# Patient Record
Sex: FEMALE | Race: WHITE | NOT HISPANIC OR LATINO | Employment: OTHER | URBAN - METROPOLITAN AREA
[De-identification: names, ages, dates, MRNs, and addresses within clinical notes are randomized per-mention and may not be internally consistent; named-entity substitution may affect disease eponyms.]

---

## 2017-03-12 ENCOUNTER — COMMUNICATION - HEALTHEAST (OUTPATIENT)
Dept: FAMILY MEDICINE | Facility: CLINIC | Age: 67
End: 2017-03-12

## 2017-03-12 DIAGNOSIS — Z00.00 ROUTINE ADULT HEALTH MAINTENANCE: ICD-10-CM

## 2017-03-12 DIAGNOSIS — E78.00 ELEVATED CHOLESTEROL: ICD-10-CM

## 2017-03-12 DIAGNOSIS — E87.5 SERUM POTASSIUM ELEVATED: ICD-10-CM

## 2017-03-15 ENCOUNTER — AMBULATORY - HEALTHEAST (OUTPATIENT)
Dept: LAB | Facility: CLINIC | Age: 67
End: 2017-03-15

## 2017-03-15 DIAGNOSIS — E87.5 SERUM POTASSIUM ELEVATED: ICD-10-CM

## 2017-03-15 DIAGNOSIS — E78.00 ELEVATED CHOLESTEROL: ICD-10-CM

## 2017-03-15 DIAGNOSIS — Z00.00 ROUTINE ADULT HEALTH MAINTENANCE: ICD-10-CM

## 2017-03-15 LAB
CHOLEST SERPL-MCNC: 215 MG/DL
FASTING STATUS PATIENT QL REPORTED: YES
HDLC SERPL-MCNC: 71 MG/DL
LDLC SERPL CALC-MCNC: 130 MG/DL
TRIGL SERPL-MCNC: 70 MG/DL

## 2017-03-22 ENCOUNTER — OFFICE VISIT - HEALTHEAST (OUTPATIENT)
Dept: FAMILY MEDICINE | Facility: CLINIC | Age: 67
End: 2017-03-22

## 2017-03-22 DIAGNOSIS — R32 URINARY INCONTINENCE: ICD-10-CM

## 2017-03-22 DIAGNOSIS — Z00.00 ROUTINE GENERAL MEDICAL EXAMINATION AT A HEALTH CARE FACILITY: ICD-10-CM

## 2017-03-22 DIAGNOSIS — H26.9 UNSPECIFIED CATARACT: ICD-10-CM

## 2017-03-22 DIAGNOSIS — N39.492 POSTURAL URINARY INCONTINENCE: ICD-10-CM

## 2017-03-22 DIAGNOSIS — M19.071 PRIMARY LOCALIZED OSTEOARTHROSIS, ANKLE AND FOOT, RIGHT: ICD-10-CM

## 2017-03-22 DIAGNOSIS — M81.0 OSTEOPOROSIS: ICD-10-CM

## 2017-03-22 DIAGNOSIS — Z00.00 HEALTH MAINTENANCE EXAMINATION: ICD-10-CM

## 2017-03-22 ASSESSMENT — MIFFLIN-ST. JEOR: SCORE: 1341.5

## 2017-03-23 ENCOUNTER — OFFICE VISIT - HEALTHEAST (OUTPATIENT)
Dept: PHYSICAL THERAPY | Facility: CLINIC | Age: 67
End: 2017-03-23

## 2017-03-23 DIAGNOSIS — M81.0 OSTEOPOROSIS: ICD-10-CM

## 2017-03-23 DIAGNOSIS — N39.41 URGE INCONTINENCE: ICD-10-CM

## 2017-03-23 DIAGNOSIS — R39.15 URINARY URGENCY: ICD-10-CM

## 2017-03-23 DIAGNOSIS — N81.89 PELVIC FLOOR WEAKNESS: ICD-10-CM

## 2017-04-06 ENCOUNTER — OFFICE VISIT - HEALTHEAST (OUTPATIENT)
Dept: PHYSICAL THERAPY | Facility: CLINIC | Age: 67
End: 2017-04-06

## 2017-04-06 DIAGNOSIS — R39.15 URINARY URGENCY: ICD-10-CM

## 2017-04-06 DIAGNOSIS — M81.0 OSTEOPOROSIS: ICD-10-CM

## 2017-04-06 DIAGNOSIS — N39.41 URGE INCONTINENCE: ICD-10-CM

## 2017-04-06 DIAGNOSIS — N81.89 PELVIC FLOOR WEAKNESS: ICD-10-CM

## 2017-04-11 ENCOUNTER — OFFICE VISIT - HEALTHEAST (OUTPATIENT)
Dept: PHYSICAL THERAPY | Facility: CLINIC | Age: 67
End: 2017-04-11

## 2017-04-11 DIAGNOSIS — R39.15 URINARY URGENCY: ICD-10-CM

## 2017-04-11 DIAGNOSIS — M81.0 OSTEOPOROSIS: ICD-10-CM

## 2017-04-11 DIAGNOSIS — N39.41 URGE INCONTINENCE: ICD-10-CM

## 2017-04-11 DIAGNOSIS — N81.89 PELVIC FLOOR WEAKNESS: ICD-10-CM

## 2017-04-26 ENCOUNTER — OFFICE VISIT - HEALTHEAST (OUTPATIENT)
Dept: PHYSICAL THERAPY | Facility: CLINIC | Age: 67
End: 2017-04-26

## 2017-04-26 DIAGNOSIS — M81.0 OSTEOPOROSIS: ICD-10-CM

## 2017-04-26 DIAGNOSIS — R39.15 URINARY URGENCY: ICD-10-CM

## 2017-04-26 DIAGNOSIS — N81.89 PELVIC FLOOR WEAKNESS: ICD-10-CM

## 2017-04-26 DIAGNOSIS — N39.41 URGE INCONTINENCE: ICD-10-CM

## 2017-05-09 ENCOUNTER — OFFICE VISIT - HEALTHEAST (OUTPATIENT)
Dept: PHYSICAL THERAPY | Facility: CLINIC | Age: 67
End: 2017-05-09

## 2017-05-09 DIAGNOSIS — N39.41 URGE INCONTINENCE: ICD-10-CM

## 2017-05-09 DIAGNOSIS — M81.0 OSTEOPOROSIS: ICD-10-CM

## 2017-05-09 DIAGNOSIS — R39.15 URINARY URGENCY: ICD-10-CM

## 2017-05-09 DIAGNOSIS — N81.89 PELVIC FLOOR WEAKNESS: ICD-10-CM

## 2017-05-23 ENCOUNTER — OFFICE VISIT - HEALTHEAST (OUTPATIENT)
Dept: PHYSICAL THERAPY | Facility: CLINIC | Age: 67
End: 2017-05-23

## 2017-05-23 DIAGNOSIS — N81.89 PELVIC FLOOR WEAKNESS: ICD-10-CM

## 2017-05-23 DIAGNOSIS — R39.15 URINARY URGENCY: ICD-10-CM

## 2017-05-23 DIAGNOSIS — M81.0 OSTEOPOROSIS: ICD-10-CM

## 2017-05-23 DIAGNOSIS — N39.41 URGE INCONTINENCE: ICD-10-CM

## 2017-06-06 ENCOUNTER — OFFICE VISIT - HEALTHEAST (OUTPATIENT)
Dept: PHYSICAL THERAPY | Facility: CLINIC | Age: 67
End: 2017-06-06

## 2017-06-06 DIAGNOSIS — N39.41 URGE INCONTINENCE: ICD-10-CM

## 2017-06-06 DIAGNOSIS — M81.0 OSTEOPOROSIS: ICD-10-CM

## 2017-06-06 DIAGNOSIS — R39.15 URINARY URGENCY: ICD-10-CM

## 2017-06-06 DIAGNOSIS — N81.89 PELVIC FLOOR WEAKNESS: ICD-10-CM

## 2017-06-27 ENCOUNTER — OFFICE VISIT - HEALTHEAST (OUTPATIENT)
Dept: PHYSICAL THERAPY | Facility: CLINIC | Age: 67
End: 2017-06-27

## 2017-06-27 DIAGNOSIS — M81.0 OSTEOPOROSIS: ICD-10-CM

## 2017-06-27 DIAGNOSIS — N39.41 URGE INCONTINENCE: ICD-10-CM

## 2017-06-27 DIAGNOSIS — N81.89 PELVIC FLOOR WEAKNESS: ICD-10-CM

## 2017-06-27 DIAGNOSIS — R39.15 URINARY URGENCY: ICD-10-CM

## 2017-07-28 ENCOUNTER — OFFICE VISIT - HEALTHEAST (OUTPATIENT)
Dept: PHYSICAL THERAPY | Facility: REHABILITATION | Age: 67
End: 2017-07-28

## 2017-07-28 DIAGNOSIS — N81.89 PELVIC FLOOR WEAKNESS: ICD-10-CM

## 2017-07-28 DIAGNOSIS — N39.41 URGE INCONTINENCE: ICD-10-CM

## 2017-07-28 DIAGNOSIS — M81.0 OSTEOPOROSIS: ICD-10-CM

## 2017-07-28 DIAGNOSIS — R39.15 URINARY URGENCY: ICD-10-CM

## 2017-07-31 ENCOUNTER — COMMUNICATION - HEALTHEAST (OUTPATIENT)
Dept: FAMILY MEDICINE | Facility: CLINIC | Age: 67
End: 2017-07-31

## 2017-07-31 DIAGNOSIS — N95.2 POSTMENOPAUSAL ATROPHIC VAGINITIS: ICD-10-CM

## 2017-08-14 ENCOUNTER — COMMUNICATION - HEALTHEAST (OUTPATIENT)
Dept: FAMILY MEDICINE | Facility: CLINIC | Age: 67
End: 2017-08-14

## 2017-08-22 ENCOUNTER — OFFICE VISIT - HEALTHEAST (OUTPATIENT)
Dept: PHYSICAL THERAPY | Facility: REHABILITATION | Age: 67
End: 2017-08-22

## 2017-08-22 DIAGNOSIS — N81.89 PELVIC FLOOR WEAKNESS: ICD-10-CM

## 2017-08-22 DIAGNOSIS — M81.0 OSTEOPOROSIS: ICD-10-CM

## 2017-08-22 DIAGNOSIS — N39.41 URGE INCONTINENCE: ICD-10-CM

## 2017-08-22 DIAGNOSIS — R39.15 URINARY URGENCY: ICD-10-CM

## 2017-09-08 ENCOUNTER — OFFICE VISIT - HEALTHEAST (OUTPATIENT)
Dept: FAMILY MEDICINE | Facility: CLINIC | Age: 67
End: 2017-09-08

## 2017-09-08 DIAGNOSIS — R17 TOTAL BILIRUBIN, ELEVATED: ICD-10-CM

## 2017-09-08 DIAGNOSIS — R42 DIZZY SPELLS: ICD-10-CM

## 2017-09-08 LAB — HBA1C MFR BLD: 5.7 % (ref 3.5–6)

## 2017-09-08 ASSESSMENT — MIFFLIN-ST. JEOR: SCORE: 1368.17

## 2017-09-11 ENCOUNTER — COMMUNICATION - HEALTHEAST (OUTPATIENT)
Dept: FAMILY MEDICINE | Facility: CLINIC | Age: 67
End: 2017-09-11

## 2017-09-12 ENCOUNTER — COMMUNICATION - HEALTHEAST (OUTPATIENT)
Dept: FAMILY MEDICINE | Facility: CLINIC | Age: 67
End: 2017-09-12

## 2017-09-13 ENCOUNTER — COMMUNICATION - HEALTHEAST (OUTPATIENT)
Dept: FAMILY MEDICINE | Facility: CLINIC | Age: 67
End: 2017-09-13

## 2017-09-13 DIAGNOSIS — N95.2 POST-MENOPAUSAL ATROPHIC VAGINITIS: ICD-10-CM

## 2017-09-14 ENCOUNTER — COMMUNICATION - HEALTHEAST (OUTPATIENT)
Dept: FAMILY MEDICINE | Facility: CLINIC | Age: 67
End: 2017-09-14

## 2017-09-14 ENCOUNTER — RECORDS - HEALTHEAST (OUTPATIENT)
Dept: ADMINISTRATIVE | Facility: OTHER | Age: 67
End: 2017-09-14

## 2017-09-18 ENCOUNTER — COMMUNICATION - HEALTHEAST (OUTPATIENT)
Dept: FAMILY MEDICINE | Facility: CLINIC | Age: 67
End: 2017-09-18

## 2017-09-19 ENCOUNTER — OFFICE VISIT - HEALTHEAST (OUTPATIENT)
Dept: FAMILY MEDICINE | Facility: CLINIC | Age: 67
End: 2017-09-19

## 2017-09-19 DIAGNOSIS — L03.90 CELLULITIS: ICD-10-CM

## 2017-09-19 DIAGNOSIS — L23.7 POISON IVY DERMATITIS: ICD-10-CM

## 2017-09-24 ENCOUNTER — OFFICE VISIT - HEALTHEAST (OUTPATIENT)
Dept: FAMILY MEDICINE | Facility: CLINIC | Age: 67
End: 2017-09-24

## 2017-09-24 DIAGNOSIS — L23.7 POISON IVY DERMATITIS: ICD-10-CM

## 2017-10-03 ENCOUNTER — COMMUNICATION - HEALTHEAST (OUTPATIENT)
Dept: FAMILY MEDICINE | Facility: CLINIC | Age: 67
End: 2017-10-03

## 2017-11-02 ENCOUNTER — OFFICE VISIT - HEALTHEAST (OUTPATIENT)
Dept: FAMILY MEDICINE | Facility: CLINIC | Age: 67
End: 2017-11-02

## 2017-11-02 DIAGNOSIS — R07.0 THROAT PAIN: ICD-10-CM

## 2017-11-03 ENCOUNTER — OFFICE VISIT - HEALTHEAST (OUTPATIENT)
Dept: FAMILY MEDICINE | Facility: CLINIC | Age: 67
End: 2017-11-03

## 2017-11-03 DIAGNOSIS — J02.9 SORE THROAT: ICD-10-CM

## 2017-11-03 DIAGNOSIS — F43.9 STRESS AT HOME: ICD-10-CM

## 2017-11-03 ASSESSMENT — MIFFLIN-ST. JEOR: SCORE: 1378.38

## 2017-11-22 ENCOUNTER — COMMUNICATION - HEALTHEAST (OUTPATIENT)
Dept: FAMILY MEDICINE | Facility: CLINIC | Age: 67
End: 2017-11-22

## 2017-11-29 ENCOUNTER — HOSPITAL ENCOUNTER (OUTPATIENT)
Dept: CARDIOLOGY | Facility: HOSPITAL | Age: 67
Discharge: HOME | End: 2017-11-29
Attending: FAMILY MEDICINE

## 2017-11-29 DIAGNOSIS — R42 DIZZY SPELLS: ICD-10-CM

## 2017-12-05 ENCOUNTER — COMMUNICATION - HEALTHEAST (OUTPATIENT)
Dept: FAMILY MEDICINE | Facility: CLINIC | Age: 67
End: 2017-12-05

## 2018-01-02 ENCOUNTER — COMMUNICATION - HEALTHEAST (OUTPATIENT)
Dept: FAMILY MEDICINE | Facility: CLINIC | Age: 68
End: 2018-01-02

## 2018-01-02 ENCOUNTER — RECORDS - HEALTHEAST (OUTPATIENT)
Dept: ADMINISTRATIVE | Facility: OTHER | Age: 68
End: 2018-01-02

## 2018-01-11 ENCOUNTER — COMMUNICATION - HEALTHEAST (OUTPATIENT)
Dept: FAMILY MEDICINE | Facility: CLINIC | Age: 68
End: 2018-01-11

## 2018-01-16 ENCOUNTER — RECORDS - HEALTHEAST (OUTPATIENT)
Dept: GENERAL RADIOLOGY | Facility: CLINIC | Age: 68
End: 2018-01-16

## 2018-01-16 ENCOUNTER — OFFICE VISIT - HEALTHEAST (OUTPATIENT)
Dept: FAMILY MEDICINE | Facility: CLINIC | Age: 68
End: 2018-01-16

## 2018-01-16 ENCOUNTER — COMMUNICATION - HEALTHEAST (OUTPATIENT)
Dept: FAMILY MEDICINE | Facility: CLINIC | Age: 68
End: 2018-01-16

## 2018-01-16 DIAGNOSIS — M25.522 LEFT ELBOW PAIN: ICD-10-CM

## 2018-01-16 DIAGNOSIS — M25.522 PAIN IN LEFT ELBOW: ICD-10-CM

## 2018-01-16 DIAGNOSIS — L82.1 SEBORRHEIC KERATOSIS: ICD-10-CM

## 2018-01-16 DIAGNOSIS — N95.2 PERIMENOPAUSAL ATROPHIC VAGINITIS: ICD-10-CM

## 2018-01-16 ASSESSMENT — MIFFLIN-ST. JEOR: SCORE: 1373.27

## 2018-01-17 ENCOUNTER — COMMUNICATION - HEALTHEAST (OUTPATIENT)
Dept: FAMILY MEDICINE | Facility: CLINIC | Age: 68
End: 2018-01-17

## 2018-01-19 ENCOUNTER — COMMUNICATION - HEALTHEAST (OUTPATIENT)
Dept: FAMILY MEDICINE | Facility: CLINIC | Age: 68
End: 2018-01-19

## 2018-01-23 ENCOUNTER — RECORDS - HEALTHEAST (OUTPATIENT)
Dept: ADMINISTRATIVE | Facility: OTHER | Age: 68
End: 2018-01-23

## 2018-01-31 ENCOUNTER — COMMUNICATION - HEALTHEAST (OUTPATIENT)
Dept: FAMILY MEDICINE | Facility: CLINIC | Age: 68
End: 2018-01-31

## 2018-03-18 ENCOUNTER — COMMUNICATION - HEALTHEAST (OUTPATIENT)
Dept: FAMILY MEDICINE | Facility: CLINIC | Age: 68
End: 2018-03-18

## 2018-03-22 ENCOUNTER — COMMUNICATION - HEALTHEAST (OUTPATIENT)
Dept: FAMILY MEDICINE | Facility: CLINIC | Age: 68
End: 2018-03-22

## 2018-03-22 ENCOUNTER — AMBULATORY - HEALTHEAST (OUTPATIENT)
Dept: FAMILY MEDICINE | Facility: CLINIC | Age: 68
End: 2018-03-22

## 2018-03-22 DIAGNOSIS — Z00.00 ROUTINE ADULT HEALTH MAINTENANCE: ICD-10-CM

## 2018-03-22 DIAGNOSIS — R17 ELEVATED BILIRUBIN: ICD-10-CM

## 2018-03-22 DIAGNOSIS — E78.00 PURE HYPERCHOLESTEROLEMIA: ICD-10-CM

## 2018-04-13 ENCOUNTER — AMBULATORY - HEALTHEAST (OUTPATIENT)
Dept: LAB | Facility: CLINIC | Age: 68
End: 2018-04-13

## 2018-04-13 DIAGNOSIS — Z00.00 ROUTINE ADULT HEALTH MAINTENANCE: ICD-10-CM

## 2018-04-13 DIAGNOSIS — R17 ELEVATED BILIRUBIN: ICD-10-CM

## 2018-04-13 DIAGNOSIS — E78.00 PURE HYPERCHOLESTEROLEMIA: ICD-10-CM

## 2018-04-13 LAB
ANION GAP SERPL CALCULATED.3IONS-SCNC: 11 MMOL/L (ref 5–18)
BILIRUB DIRECT SERPL-MCNC: 0.4 MG/DL
BILIRUB SERPL-MCNC: 1.5 MG/DL (ref 0–1)
BUN SERPL-MCNC: 18 MG/DL (ref 8–22)
CALCIUM SERPL-MCNC: 9.9 MG/DL (ref 8.5–10.5)
CHLORIDE BLD-SCNC: 103 MMOL/L (ref 98–107)
CHOLEST SERPL-MCNC: 202 MG/DL
CO2 SERPL-SCNC: 26 MMOL/L (ref 22–31)
CREAT SERPL-MCNC: 0.8 MG/DL (ref 0.6–1.1)
FASTING STATUS PATIENT QL REPORTED: YES
GFR SERPL CREATININE-BSD FRML MDRD: >60 ML/MIN/1.73M2
GLUCOSE BLD-MCNC: 90 MG/DL (ref 70–125)
HDLC SERPL-MCNC: 66 MG/DL
LDLC SERPL CALC-MCNC: 125 MG/DL
POTASSIUM BLD-SCNC: 4.4 MMOL/L (ref 3.5–5)
SODIUM SERPL-SCNC: 140 MMOL/L (ref 136–145)
TRIGL SERPL-MCNC: 57 MG/DL

## 2018-04-18 ENCOUNTER — OFFICE VISIT - HEALTHEAST (OUTPATIENT)
Dept: FAMILY MEDICINE | Facility: CLINIC | Age: 68
End: 2018-04-18

## 2018-04-18 ENCOUNTER — COMMUNICATION - HEALTHEAST (OUTPATIENT)
Dept: FAMILY MEDICINE | Facility: CLINIC | Age: 68
End: 2018-04-18

## 2018-04-18 DIAGNOSIS — R92.30 DENSE BREAST TISSUE: ICD-10-CM

## 2018-04-18 DIAGNOSIS — Z80.3 FAMILY HISTORY OF BREAST CANCER: ICD-10-CM

## 2018-04-18 DIAGNOSIS — N95.2 POSTMENOPAUSAL ATROPHIC VAGINITIS: ICD-10-CM

## 2018-04-18 DIAGNOSIS — H25.9 AGE-RELATED CATARACT OF BOTH EYES, UNSPECIFIED AGE-RELATED CATARACT TYPE: ICD-10-CM

## 2018-04-18 DIAGNOSIS — Z12.31 SCREENING MAMMOGRAM, ENCOUNTER FOR: ICD-10-CM

## 2018-04-18 DIAGNOSIS — Z00.00 ROUTINE GENERAL MEDICAL EXAMINATION AT A HEALTH CARE FACILITY: ICD-10-CM

## 2018-04-18 ASSESSMENT — MIFFLIN-ST. JEOR: SCORE: 1321.68

## 2018-04-24 ENCOUNTER — OFFICE VISIT - HEALTHEAST (OUTPATIENT)
Dept: FAMILY MEDICINE | Facility: CLINIC | Age: 68
End: 2018-04-24

## 2018-04-24 DIAGNOSIS — S43.005A SHOULDER SEPARATION, LEFT, INITIAL ENCOUNTER: ICD-10-CM

## 2018-04-28 ENCOUNTER — COMMUNICATION - HEALTHEAST (OUTPATIENT)
Dept: FAMILY MEDICINE | Facility: CLINIC | Age: 68
End: 2018-04-28

## 2018-05-17 ENCOUNTER — HOSPITAL ENCOUNTER (OUTPATIENT)
Dept: MAMMOGRAPHY | Facility: CLINIC | Age: 68
Discharge: HOME | End: 2018-05-17
Attending: FAMILY MEDICINE

## 2018-05-17 DIAGNOSIS — Z12.31 SCREENING MAMMOGRAM, ENCOUNTER FOR: ICD-10-CM

## 2018-08-13 ENCOUNTER — COMMUNICATION - HEALTHEAST (OUTPATIENT)
Dept: SCHEDULING | Facility: CLINIC | Age: 68
End: 2018-08-13

## 2018-08-13 ENCOUNTER — OFFICE VISIT - HEALTHEAST (OUTPATIENT)
Dept: FAMILY MEDICINE | Facility: CLINIC | Age: 68
End: 2018-08-13

## 2018-08-13 DIAGNOSIS — N89.8 VAGINAL CYST: ICD-10-CM

## 2018-08-13 ASSESSMENT — MIFFLIN-ST. JEOR: SCORE: 1329.18

## 2018-08-23 ENCOUNTER — RECORDS - HEALTHEAST (OUTPATIENT)
Dept: ADMINISTRATIVE | Facility: OTHER | Age: 68
End: 2018-08-23

## 2018-10-04 ENCOUNTER — COMMUNICATION - HEALTHEAST (OUTPATIENT)
Dept: FAMILY MEDICINE | Facility: CLINIC | Age: 68
End: 2018-10-04

## 2018-12-28 ENCOUNTER — RECORDS - HEALTHEAST (OUTPATIENT)
Dept: GENERAL RADIOLOGY | Facility: CLINIC | Age: 68
End: 2018-12-28

## 2018-12-28 ENCOUNTER — OFFICE VISIT - HEALTHEAST (OUTPATIENT)
Dept: FAMILY MEDICINE | Facility: CLINIC | Age: 68
End: 2018-12-28

## 2018-12-28 DIAGNOSIS — M25.562 ACUTE PAIN OF LEFT KNEE: ICD-10-CM

## 2018-12-28 DIAGNOSIS — M81.0 AGE-RELATED OSTEOPOROSIS WITHOUT CURRENT PATHOLOGICAL FRACTURE: ICD-10-CM

## 2018-12-28 DIAGNOSIS — R03.0 ELEVATED BP WITHOUT DIAGNOSIS OF HYPERTENSION: ICD-10-CM

## 2018-12-28 DIAGNOSIS — N95.2 POSTMENOPAUSAL ATROPHIC VAGINITIS: ICD-10-CM

## 2018-12-28 DIAGNOSIS — H25.9 AGE-RELATED CATARACT OF BOTH EYES, UNSPECIFIED AGE-RELATED CATARACT TYPE: ICD-10-CM

## 2018-12-28 DIAGNOSIS — M25.562 PAIN IN LEFT KNEE: ICD-10-CM

## 2018-12-28 ASSESSMENT — MIFFLIN-ST. JEOR: SCORE: 1316.98

## 2019-01-09 ENCOUNTER — OFFICE VISIT - HEALTHEAST (OUTPATIENT)
Dept: FAMILY MEDICINE | Facility: CLINIC | Age: 69
End: 2019-01-09

## 2019-01-09 DIAGNOSIS — T14.8XXA BRUISING: ICD-10-CM

## 2019-01-09 LAB
BASOPHILS # BLD AUTO: 0 THOU/UL (ref 0–0.2)
BASOPHILS NFR BLD AUTO: 1 % (ref 0–2)
EOSINOPHIL # BLD AUTO: 0.2 THOU/UL (ref 0–0.4)
EOSINOPHIL NFR BLD AUTO: 3 % (ref 0–6)
ERYTHROCYTE [DISTWIDTH] IN BLOOD BY AUTOMATED COUNT: 11.9 % (ref 11–14.5)
HCT VFR BLD AUTO: 39.1 % (ref 35–47)
HGB BLD-MCNC: 13.3 G/DL (ref 12–16)
LYMPHOCYTES # BLD AUTO: 1.6 THOU/UL (ref 0.8–4.4)
LYMPHOCYTES NFR BLD AUTO: 26 % (ref 20–40)
MCH RBC QN AUTO: 31.4 PG (ref 27–34)
MCHC RBC AUTO-ENTMCNC: 33.9 G/DL (ref 32–36)
MCV RBC AUTO: 92 FL (ref 80–100)
MONOCYTES # BLD AUTO: 0.4 THOU/UL (ref 0–0.9)
MONOCYTES NFR BLD AUTO: 7 % (ref 2–10)
NEUTROPHILS # BLD AUTO: 3.9 THOU/UL (ref 2–7.7)
NEUTROPHILS NFR BLD AUTO: 64 % (ref 50–70)
PLATELET # BLD AUTO: 184 THOU/UL (ref 140–440)
PMV BLD AUTO: 7.4 FL (ref 7–10)
RBC # BLD AUTO: 4.23 MILL/UL (ref 3.8–5.4)
WBC: 6 THOU/UL (ref 4–11)

## 2019-01-09 ASSESSMENT — MIFFLIN-ST. JEOR: SCORE: 1331.02

## 2019-02-26 ENCOUNTER — OFFICE VISIT - HEALTHEAST (OUTPATIENT)
Dept: FAMILY MEDICINE | Facility: CLINIC | Age: 69
End: 2019-02-26

## 2019-02-26 DIAGNOSIS — R21 RASH: ICD-10-CM

## 2019-02-26 ASSESSMENT — MIFFLIN-ST. JEOR: SCORE: 1348.98

## 2019-02-27 ENCOUNTER — COMMUNICATION - HEALTHEAST (OUTPATIENT)
Dept: FAMILY MEDICINE | Facility: CLINIC | Age: 69
End: 2019-02-27

## 2019-03-05 ENCOUNTER — RECORDS - HEALTHEAST (OUTPATIENT)
Dept: ADMINISTRATIVE | Facility: OTHER | Age: 69
End: 2019-03-05

## 2019-03-06 ENCOUNTER — RECORDS - HEALTHEAST (OUTPATIENT)
Dept: ADMINISTRATIVE | Facility: OTHER | Age: 69
End: 2019-03-06

## 2019-03-19 ENCOUNTER — RECORDS - HEALTHEAST (OUTPATIENT)
Dept: ADMINISTRATIVE | Facility: OTHER | Age: 69
End: 2019-03-19

## 2019-04-09 ENCOUNTER — OFFICE VISIT - HEALTHEAST (OUTPATIENT)
Dept: RHEUMATOLOGY | Facility: CLINIC | Age: 69
End: 2019-04-09

## 2019-04-09 ENCOUNTER — COMMUNICATION - HEALTHEAST (OUTPATIENT)
Dept: FAMILY MEDICINE | Facility: CLINIC | Age: 69
End: 2019-04-09

## 2019-04-09 DIAGNOSIS — R76.8 ANA POSITIVE: ICD-10-CM

## 2019-04-09 DIAGNOSIS — R21 RASH: ICD-10-CM

## 2019-04-09 ASSESSMENT — MIFFLIN-ST. JEOR: SCORE: 1348.8

## 2019-04-11 ENCOUNTER — AMBULATORY - HEALTHEAST (OUTPATIENT)
Dept: FAMILY MEDICINE | Facility: CLINIC | Age: 69
End: 2019-04-11

## 2019-04-11 ENCOUNTER — COMMUNICATION - HEALTHEAST (OUTPATIENT)
Dept: FAMILY MEDICINE | Facility: CLINIC | Age: 69
End: 2019-04-11

## 2019-04-11 DIAGNOSIS — Z00.00 ROUTINE HISTORY AND PHYSICAL EXAMINATION OF ADULT: ICD-10-CM

## 2019-04-16 ENCOUNTER — AMBULATORY - HEALTHEAST (OUTPATIENT)
Dept: LAB | Facility: CLINIC | Age: 69
End: 2019-04-16

## 2019-04-16 DIAGNOSIS — R76.8 ANA POSITIVE: ICD-10-CM

## 2019-04-16 DIAGNOSIS — Z00.00 ROUTINE HISTORY AND PHYSICAL EXAMINATION OF ADULT: ICD-10-CM

## 2019-04-16 LAB
ANION GAP SERPL CALCULATED.3IONS-SCNC: 9 MMOL/L (ref 5–18)
BASOPHILS # BLD AUTO: 0 THOU/UL (ref 0–0.2)
BASOPHILS NFR BLD AUTO: 1 % (ref 0–2)
BUN SERPL-MCNC: 17 MG/DL (ref 8–28)
C3 SERPL-MCNC: 121 MG/DL (ref 83–177)
C4 SERPL-MCNC: 27 MG/DL (ref 19–59)
CALCIUM SERPL-MCNC: 10 MG/DL (ref 8.5–10.5)
CCP AB SER IA-ACNC: <0.5 U/ML
CHLORIDE BLD-SCNC: 103 MMOL/L (ref 98–107)
CHOLEST SERPL-MCNC: 208 MG/DL
CO2 SERPL-SCNC: 27 MMOL/L (ref 22–31)
CREAT SERPL-MCNC: 0.86 MG/DL (ref 0.6–1.1)
EOSINOPHIL # BLD AUTO: 0.1 THOU/UL (ref 0–0.4)
EOSINOPHIL NFR BLD AUTO: 4 % (ref 0–6)
ERYTHROCYTE [DISTWIDTH] IN BLOOD BY AUTOMATED COUNT: 11.8 % (ref 11–14.5)
FASTING STATUS PATIENT QL REPORTED: YES
GFR SERPL CREATININE-BSD FRML MDRD: >60 ML/MIN/1.73M2
GLUCOSE BLD-MCNC: 96 MG/DL (ref 70–125)
HCT VFR BLD AUTO: 39.4 % (ref 35–47)
HDLC SERPL-MCNC: 82 MG/DL
HGB BLD-MCNC: 13.3 G/DL (ref 12–16)
LDLC SERPL CALC-MCNC: 112 MG/DL
LYMPHOCYTES # BLD AUTO: 0.9 THOU/UL (ref 0.8–4.4)
LYMPHOCYTES NFR BLD AUTO: 23 % (ref 20–40)
MCH RBC QN AUTO: 31.6 PG (ref 27–34)
MCHC RBC AUTO-ENTMCNC: 33.8 G/DL (ref 32–36)
MCV RBC AUTO: 94 FL (ref 80–100)
MONOCYTES # BLD AUTO: 0.3 THOU/UL (ref 0–0.9)
MONOCYTES NFR BLD AUTO: 7 % (ref 2–10)
NEUTROPHILS # BLD AUTO: 2.5 THOU/UL (ref 2–7.7)
NEUTROPHILS NFR BLD AUTO: 66 % (ref 50–70)
PLATELET # BLD AUTO: 170 THOU/UL (ref 140–440)
PMV BLD AUTO: 8.7 FL (ref 7–10)
POTASSIUM BLD-SCNC: 4.6 MMOL/L (ref 3.5–5)
RBC # BLD AUTO: 4.21 MILL/UL (ref 3.8–5.4)
RHEUMATOID FACT SERPL-ACNC: <15 IU/ML (ref 0–30)
SODIUM SERPL-SCNC: 139 MMOL/L (ref 136–145)
TRIGL SERPL-MCNC: 68 MG/DL
WBC: 3.8 THOU/UL (ref 4–11)

## 2019-04-17 ENCOUNTER — COMMUNICATION - HEALTHEAST (OUTPATIENT)
Dept: ADMINISTRATIVE | Facility: CLINIC | Age: 69
End: 2019-04-17

## 2019-04-18 LAB
ANA SER QL: 0.6 U
ANCA IGG TITR SER IF: NORMAL {TITER}

## 2019-04-19 ENCOUNTER — OFFICE VISIT - HEALTHEAST (OUTPATIENT)
Dept: FAMILY MEDICINE | Facility: CLINIC | Age: 69
End: 2019-04-19

## 2019-04-19 DIAGNOSIS — R00.1 BRADYCARDIA BY ELECTROCARDIOGRAM: ICD-10-CM

## 2019-04-19 DIAGNOSIS — Z86.59 HX OF MAJOR DEPRESSION: ICD-10-CM

## 2019-04-19 DIAGNOSIS — Z23 NEED FOR SHINGLES VACCINE: ICD-10-CM

## 2019-04-19 DIAGNOSIS — F51.02 ADJUSTMENT INSOMNIA: ICD-10-CM

## 2019-04-19 DIAGNOSIS — M81.0 AGE-RELATED OSTEOPOROSIS WITHOUT CURRENT PATHOLOGICAL FRACTURE: ICD-10-CM

## 2019-04-19 DIAGNOSIS — Z00.00 ENCOUNTER FOR MEDICARE ANNUAL WELLNESS EXAM: ICD-10-CM

## 2019-04-19 DIAGNOSIS — R41.3 MEMORY DIFFICULTY: ICD-10-CM

## 2019-04-19 LAB
ATRIAL RATE - MUSE: 54 BPM
DIASTOLIC BLOOD PRESSURE - MUSE: NORMAL MMHG
INTERPRETATION ECG - MUSE: NORMAL
P AXIS - MUSE: 68 DEGREES
PR INTERVAL - MUSE: 200 MS
QRS DURATION - MUSE: 82 MS
QT - MUSE: 430 MS
QTC - MUSE: 407 MS
R AXIS - MUSE: 34 DEGREES
SYSTOLIC BLOOD PRESSURE - MUSE: NORMAL MMHG
T AXIS - MUSE: 39 DEGREES
VENTRICULAR RATE- MUSE: 54 BPM

## 2019-04-19 ASSESSMENT — MIFFLIN-ST. JEOR: SCORE: 1329.98

## 2019-04-24 ENCOUNTER — COMMUNICATION - HEALTHEAST (OUTPATIENT)
Dept: FAMILY MEDICINE | Facility: CLINIC | Age: 69
End: 2019-04-24

## 2019-04-24 DIAGNOSIS — M81.0 SENILE OSTEOPOROSIS: ICD-10-CM

## 2019-04-24 DIAGNOSIS — M85.80 OSTEOPENIA, UNSPECIFIED LOCATION: ICD-10-CM

## 2019-05-01 ENCOUNTER — COMMUNICATION - HEALTHEAST (OUTPATIENT)
Dept: RHEUMATOLOGY | Facility: CLINIC | Age: 69
End: 2019-05-01

## 2019-05-01 ENCOUNTER — AMBULATORY - HEALTHEAST (OUTPATIENT)
Dept: SCHEDULING | Facility: CLINIC | Age: 69
End: 2019-05-01

## 2019-05-01 DIAGNOSIS — M81.0 SENILE OSTEOPOROSIS: ICD-10-CM

## 2019-05-21 ENCOUNTER — COMMUNICATION - HEALTHEAST (OUTPATIENT)
Dept: FAMILY MEDICINE | Facility: CLINIC | Age: 69
End: 2019-05-21

## 2019-05-30 ENCOUNTER — HOSPITAL ENCOUNTER (OUTPATIENT)
Dept: MAMMOGRAPHY | Facility: CLINIC | Age: 69
Discharge: HOME | End: 2019-05-30
Attending: FAMILY MEDICINE

## 2019-05-30 DIAGNOSIS — Z12.31 VISIT FOR SCREENING MAMMOGRAM: ICD-10-CM

## 2019-06-16 ENCOUNTER — COMMUNICATION - HEALTHEAST (OUTPATIENT)
Dept: RHEUMATOLOGY | Facility: CLINIC | Age: 69
End: 2019-06-16

## 2019-07-09 ENCOUNTER — OFFICE VISIT - HEALTHEAST (OUTPATIENT)
Dept: RHEUMATOLOGY | Facility: CLINIC | Age: 69
End: 2019-07-09

## 2019-07-09 DIAGNOSIS — M25.50 POLYARTHRALGIA: ICD-10-CM

## 2019-07-09 DIAGNOSIS — M17.0 BILATERAL PRIMARY OSTEOARTHRITIS OF KNEE: ICD-10-CM

## 2019-07-12 ENCOUNTER — COMMUNICATION - HEALTHEAST (OUTPATIENT)
Dept: FAMILY MEDICINE | Facility: CLINIC | Age: 69
End: 2019-07-12

## 2019-07-22 ENCOUNTER — COMMUNICATION - HEALTHEAST (OUTPATIENT)
Dept: FAMILY MEDICINE | Facility: CLINIC | Age: 69
End: 2019-07-22

## 2019-07-22 ENCOUNTER — COMMUNICATION - HEALTHEAST (OUTPATIENT)
Dept: RHEUMATOLOGY | Facility: CLINIC | Age: 69
End: 2019-07-22

## 2019-08-07 ENCOUNTER — COMMUNICATION - HEALTHEAST (OUTPATIENT)
Dept: SCHEDULING | Facility: CLINIC | Age: 69
End: 2019-08-07

## 2019-08-07 DIAGNOSIS — Z23 NEED FOR SHINGLES VACCINE: ICD-10-CM

## 2019-08-08 ENCOUNTER — COMMUNICATION - HEALTHEAST (OUTPATIENT)
Dept: SCHEDULING | Facility: CLINIC | Age: 69
End: 2019-08-08

## 2019-08-08 ENCOUNTER — AMBULATORY - HEALTHEAST (OUTPATIENT)
Dept: NURSING | Facility: CLINIC | Age: 69
End: 2019-08-08

## 2019-08-08 DIAGNOSIS — Z23 NEED FOR SHINGLES VACCINE: ICD-10-CM

## 2019-08-27 ENCOUNTER — COMMUNICATION - HEALTHEAST (OUTPATIENT)
Dept: FAMILY MEDICINE | Facility: CLINIC | Age: 69
End: 2019-08-27

## 2019-08-27 DIAGNOSIS — M25.50 MULTIPLE JOINT PAIN: ICD-10-CM

## 2019-08-28 ENCOUNTER — COMMUNICATION - HEALTHEAST (OUTPATIENT)
Dept: FAMILY MEDICINE | Facility: CLINIC | Age: 69
End: 2019-08-28

## 2019-08-29 ENCOUNTER — AMBULATORY - HEALTHEAST (OUTPATIENT)
Dept: LAB | Facility: CLINIC | Age: 69
End: 2019-08-29

## 2019-08-29 DIAGNOSIS — M25.50 MULTIPLE JOINT PAIN: ICD-10-CM

## 2019-08-30 ENCOUNTER — COMMUNICATION - HEALTHEAST (OUTPATIENT)
Dept: FAMILY MEDICINE | Facility: CLINIC | Age: 69
End: 2019-08-30

## 2019-08-30 LAB — B BURGDOR IGG+IGM SER QL: 0.09 INDEX VALUE

## 2019-10-03 ENCOUNTER — COMMUNICATION - HEALTHEAST (OUTPATIENT)
Dept: FAMILY MEDICINE | Facility: CLINIC | Age: 69
End: 2019-10-03

## 2019-10-03 DIAGNOSIS — M65.30 TRIGGER FINGER, ACQUIRED: ICD-10-CM

## 2019-10-11 ENCOUNTER — COMMUNICATION - HEALTHEAST (OUTPATIENT)
Dept: FAMILY MEDICINE | Facility: CLINIC | Age: 69
End: 2019-10-11

## 2019-10-11 DIAGNOSIS — M65.30 TRIGGER FINGER, ACQUIRED: ICD-10-CM

## 2019-10-11 DIAGNOSIS — M25.521 RIGHT ELBOW PAIN: ICD-10-CM

## 2019-10-15 ENCOUNTER — RECORDS - HEALTHEAST (OUTPATIENT)
Dept: ADMINISTRATIVE | Facility: OTHER | Age: 69
End: 2019-10-15

## 2019-12-18 ENCOUNTER — COMMUNICATION - HEALTHEAST (OUTPATIENT)
Dept: FAMILY MEDICINE | Facility: CLINIC | Age: 69
End: 2019-12-18

## 2020-03-13 ENCOUNTER — COMMUNICATION - HEALTHEAST (OUTPATIENT)
Dept: FAMILY MEDICINE | Facility: CLINIC | Age: 70
End: 2020-03-13

## 2020-03-13 DIAGNOSIS — Z00.00 ROUTINE GENERAL MEDICAL EXAMINATION AT A HEALTH CARE FACILITY: ICD-10-CM

## 2020-03-13 DIAGNOSIS — R41.3 MEMORY DIFFICULTY: ICD-10-CM

## 2020-03-13 DIAGNOSIS — E78.00 PURE HYPERCHOLESTEROLEMIA: ICD-10-CM

## 2020-03-13 DIAGNOSIS — M81.0 AGE-RELATED OSTEOPOROSIS WITHOUT CURRENT PATHOLOGICAL FRACTURE: ICD-10-CM

## 2020-04-01 ENCOUNTER — COMMUNICATION - HEALTHEAST (OUTPATIENT)
Dept: SCHEDULING | Facility: CLINIC | Age: 70
End: 2020-04-01

## 2020-08-03 ENCOUNTER — AMBULATORY - HEALTHEAST (OUTPATIENT)
Dept: LAB | Facility: CLINIC | Age: 70
End: 2020-08-03

## 2020-08-03 DIAGNOSIS — E78.00 PURE HYPERCHOLESTEROLEMIA: ICD-10-CM

## 2020-08-03 DIAGNOSIS — Z00.00 ROUTINE GENERAL MEDICAL EXAMINATION AT A HEALTH CARE FACILITY: ICD-10-CM

## 2020-08-03 DIAGNOSIS — M81.0 AGE-RELATED OSTEOPOROSIS WITHOUT CURRENT PATHOLOGICAL FRACTURE: ICD-10-CM

## 2020-08-03 LAB
ANION GAP SERPL CALCULATED.3IONS-SCNC: 12 MMOL/L (ref 5–18)
BUN SERPL-MCNC: 9 MG/DL (ref 8–28)
CALCIUM SERPL-MCNC: 10.1 MG/DL (ref 8.5–10.5)
CHLORIDE BLD-SCNC: 102 MMOL/L (ref 98–107)
CHOLEST SERPL-MCNC: 218 MG/DL
CO2 SERPL-SCNC: 23 MMOL/L (ref 22–31)
CREAT SERPL-MCNC: 0.77 MG/DL (ref 0.6–1.1)
ERYTHROCYTE [DISTWIDTH] IN BLOOD BY AUTOMATED COUNT: 11.3 % (ref 11–14.5)
FASTING STATUS PATIENT QL REPORTED: YES
GFR SERPL CREATININE-BSD FRML MDRD: >60 ML/MIN/1.73M2
GLUCOSE BLD-MCNC: 99 MG/DL (ref 70–125)
HCT VFR BLD AUTO: 43.4 % (ref 35–47)
HDLC SERPL-MCNC: 78 MG/DL
HGB BLD-MCNC: 14.3 G/DL (ref 12–16)
LDLC SERPL CALC-MCNC: 123 MG/DL
MCH RBC QN AUTO: 31.1 PG (ref 27–34)
MCHC RBC AUTO-ENTMCNC: 33 G/DL (ref 32–36)
MCV RBC AUTO: 94 FL (ref 80–100)
PLATELET # BLD AUTO: 179 THOU/UL (ref 140–440)
PMV BLD AUTO: 8.2 FL (ref 7–10)
POTASSIUM BLD-SCNC: 4.2 MMOL/L (ref 3.5–5)
RBC # BLD AUTO: 4.61 MILL/UL (ref 3.8–5.4)
SODIUM SERPL-SCNC: 137 MMOL/L (ref 136–145)
TRIGL SERPL-MCNC: 83 MG/DL
WBC: 4.6 THOU/UL (ref 4–11)

## 2020-08-04 LAB
25(OH)D3 SERPL-MCNC: 50.7 NG/ML (ref 30–80)
25(OH)D3 SERPL-MCNC: 50.7 NG/ML (ref 30–80)

## 2020-08-05 ENCOUNTER — COMMUNICATION - HEALTHEAST (OUTPATIENT)
Dept: FAMILY MEDICINE | Facility: CLINIC | Age: 70
End: 2020-08-05

## 2020-10-19 ENCOUNTER — COMMUNICATION - HEALTHEAST (OUTPATIENT)
Dept: FAMILY MEDICINE | Facility: CLINIC | Age: 70
End: 2020-10-19

## 2020-10-20 ENCOUNTER — OFFICE VISIT - HEALTHEAST (OUTPATIENT)
Dept: FAMILY MEDICINE | Facility: CLINIC | Age: 70
End: 2020-10-20

## 2020-10-20 DIAGNOSIS — M17.0 BILATERAL PRIMARY OSTEOARTHRITIS OF KNEE: ICD-10-CM

## 2020-10-20 DIAGNOSIS — Z00.00 ROUTINE GENERAL MEDICAL EXAMINATION AT A HEALTH CARE FACILITY: ICD-10-CM

## 2020-10-20 DIAGNOSIS — M81.0 AGE-RELATED OSTEOPOROSIS WITHOUT CURRENT PATHOLOGICAL FRACTURE: ICD-10-CM

## 2020-10-20 DIAGNOSIS — M25.50 POLYARTHRALGIA: ICD-10-CM

## 2020-10-20 DIAGNOSIS — Z23 NEED FOR IMMUNIZATION AGAINST INFLUENZA: ICD-10-CM

## 2020-10-20 DIAGNOSIS — N95.2 POSTMENOPAUSAL ATROPHIC VAGINITIS: ICD-10-CM

## 2020-10-20 DIAGNOSIS — L57.0 ACTINIC KERATOSIS: ICD-10-CM

## 2020-10-20 LAB
ALBUMIN UR-MCNC: NEGATIVE MG/DL
APPEARANCE UR: CLEAR
BACTERIA #/AREA URNS HPF: ABNORMAL HPF
BILIRUB UR QL STRIP: NEGATIVE
COLOR UR AUTO: YELLOW
GLUCOSE UR STRIP-MCNC: NEGATIVE MG/DL
HGB UR QL STRIP: ABNORMAL
KETONES UR STRIP-MCNC: NEGATIVE MG/DL
LEUKOCYTE ESTERASE UR QL STRIP: NEGATIVE
NITRATE UR QL: NEGATIVE
PH UR STRIP: 5.5 [PH] (ref 5–8)
RBC #/AREA URNS AUTO: ABNORMAL HPF
SP GR UR STRIP: 1.01 (ref 1–1.03)
SQUAMOUS #/AREA URNS AUTO: ABNORMAL LPF
UROBILINOGEN UR STRIP-ACNC: ABNORMAL
WBC #/AREA URNS AUTO: ABNORMAL HPF

## 2020-10-20 ASSESSMENT — MIFFLIN-ST. JEOR: SCORE: 1289.44

## 2020-10-20 ASSESSMENT — ANXIETY QUESTIONNAIRES
2. NOT BEING ABLE TO STOP OR CONTROL WORRYING: SEVERAL DAYS
1. FEELING NERVOUS, ANXIOUS, OR ON EDGE: SEVERAL DAYS

## 2020-10-23 ENCOUNTER — COMMUNICATION - HEALTHEAST (OUTPATIENT)
Dept: FAMILY MEDICINE | Facility: CLINIC | Age: 70
End: 2020-10-23

## 2020-10-23 DIAGNOSIS — F51.02 ADJUSTMENT INSOMNIA: ICD-10-CM

## 2020-10-28 ENCOUNTER — COMMUNICATION - HEALTHEAST (OUTPATIENT)
Dept: FAMILY MEDICINE | Facility: CLINIC | Age: 70
End: 2020-10-28

## 2020-10-28 DIAGNOSIS — F51.02 ADJUSTMENT INSOMNIA: ICD-10-CM

## 2020-11-16 ENCOUNTER — COMMUNICATION - HEALTHEAST (OUTPATIENT)
Dept: FAMILY MEDICINE | Facility: CLINIC | Age: 70
End: 2020-11-16

## 2021-01-23 ENCOUNTER — COMMUNICATION - HEALTHEAST (OUTPATIENT)
Dept: FAMILY MEDICINE | Facility: CLINIC | Age: 71
End: 2021-01-23

## 2021-01-25 ENCOUNTER — COMMUNICATION - HEALTHEAST (OUTPATIENT)
Dept: FAMILY MEDICINE | Facility: CLINIC | Age: 71
End: 2021-01-25

## 2021-01-25 ENCOUNTER — AMBULATORY - HEALTHEAST (OUTPATIENT)
Dept: FAMILY MEDICINE | Facility: CLINIC | Age: 71
End: 2021-01-25

## 2021-01-25 DIAGNOSIS — R21 RASH AND NONSPECIFIC SKIN ERUPTION: ICD-10-CM

## 2021-01-26 ENCOUNTER — RECORDS - HEALTHEAST (OUTPATIENT)
Dept: ADMINISTRATIVE | Facility: OTHER | Age: 71
End: 2021-01-26

## 2021-01-31 ENCOUNTER — COMMUNICATION - HEALTHEAST (OUTPATIENT)
Dept: FAMILY MEDICINE | Facility: CLINIC | Age: 71
End: 2021-01-31

## 2021-02-19 ENCOUNTER — RECORDS - HEALTHEAST (OUTPATIENT)
Dept: ADMINISTRATIVE | Facility: OTHER | Age: 71
End: 2021-02-19

## 2021-02-27 ENCOUNTER — AMBULATORY - HEALTHEAST (OUTPATIENT)
Dept: NURSING | Facility: CLINIC | Age: 71
End: 2021-02-27

## 2021-03-03 ENCOUNTER — AMBULATORY - HEALTHEAST (OUTPATIENT)
Dept: VASCULAR SURGERY | Facility: CLINIC | Age: 71
End: 2021-03-03

## 2021-03-03 DIAGNOSIS — T69.1XXA: ICD-10-CM

## 2021-03-04 ENCOUNTER — TELEPHONE (OUTPATIENT)
Dept: OTHER | Facility: CLINIC | Age: 71
End: 2021-03-04

## 2021-03-04 ENCOUNTER — AMBULATORY - HEALTHEAST (OUTPATIENT)
Dept: VASCULAR SURGERY | Facility: CLINIC | Age: 71
End: 2021-03-04

## 2021-03-04 DIAGNOSIS — T69.1XXA: ICD-10-CM

## 2021-03-04 DIAGNOSIS — I73.00 RAYNAUD'S SYNDROME WITHOUT GANGRENE: ICD-10-CM

## 2021-03-04 NOTE — TELEPHONE ENCOUNTER
Routing to  to coordinate new pt in person consult with vascular medicine for Raynaud's at next available.     WOJCIECH Zepeda, RN  [facility]  Office:  [phone] Fax: [phone]

## 2021-03-04 NOTE — TELEPHONE ENCOUNTER
Patient called because she was referred to Dr Malhotra at [place] but cant get in there until 05/27/21. Patient was referred to him by Dr Amanda Mendoza from [facility] for possible Raynaud's. Patient is willing to see whoever she can get into the soonest. Mohamud can be reached at [phone].

## 2021-03-05 ENCOUNTER — TRANSCRIBE ORDERS (OUTPATIENT)
Dept: OTHER | Age: 71
End: 2021-03-05

## 2021-03-05 DIAGNOSIS — T69.1XXA CHILBLAINS: Primary | ICD-10-CM

## 2021-03-05 NOTE — TELEPHONE ENCOUNTER
Mohamud states she will try to schedule with Dr. Malhotra at [facility]. Provided her with [phone] number to help get her scheduled.     Told her is she can call us back if she does need any further assistance.     Tesha PEARSON

## 2021-03-11 NOTE — TELEPHONE ENCOUNTER
[facility] unable to [facility].  See notes below and call patient to schedule new consult in-person here with Dr. Malhotra for Raynauds at next available.    Toshia JEFFREY, RN    [facility]  Vascular [facility]  Office: [phone]  Fax: [phone]

## 2021-03-11 NOTE — TELEPHONE ENCOUNTER
Patient is scheduled for her Consult Appointment with Dr Mejia on 03/15/21. Patient wanted to see Dr Malhotra due to him going to [facility] and Lew Mallory agreed to see another provider to get in sooner as Dr Malhotra is booked out until the end of April.

## 2021-03-12 ENCOUNTER — COMMUNICATION - HEALTHEAST (OUTPATIENT)
Dept: FAMILY MEDICINE | Facility: CLINIC | Age: 71
End: 2021-03-12

## 2021-03-15 ENCOUNTER — OFFICE VISIT (OUTPATIENT)
Dept: OTHER | Facility: CLINIC | Age: 71
End: 2021-03-15
Attending: DERMATOLOGY
Payer: COMMERCIAL

## 2021-03-15 ENCOUNTER — HOSPITAL ENCOUNTER (OUTPATIENT)
Dept: LAB | Facility: CLINIC | Age: 71
End: 2021-03-15
Attending: DERMATOLOGY
Payer: COMMERCIAL

## 2021-03-15 VITALS
BODY MASS INDEX: 23.55 KG/M2 | SYSTOLIC BLOOD PRESSURE: 150 MMHG | WEIGHT: 159 LBS | DIASTOLIC BLOOD PRESSURE: 78 MMHG | HEART RATE: 82 BPM | HEIGHT: 69 IN | RESPIRATION RATE: 16 BRPM | OXYGEN SATURATION: 94 %

## 2021-03-15 DIAGNOSIS — I73.00 RAYNAUD'S PHENOMENON WITHOUT GANGRENE: ICD-10-CM

## 2021-03-15 DIAGNOSIS — I73.00 RAYNAUD'S PHENOMENON WITHOUT GANGRENE: Primary | ICD-10-CM

## 2021-03-15 PROBLEM — Z78.0 ASYMPTOMATIC POSTMENOPAUSAL STATUS: Status: ACTIVE | Noted: 2021-03-15

## 2021-03-15 LAB
BASOPHILS # BLD AUTO: 0.1 10E9/L (ref 0–0.2)
BASOPHILS NFR BLD AUTO: 1.5 %
CRP SERPL-MCNC: <2.9 MG/L (ref 0–8)
D DIMER PPP FEU-MCNC: <0.3 UG/ML FEU (ref 0–0.5)
DIFFERENTIAL METHOD BLD: NORMAL
EOSINOPHIL # BLD AUTO: 0.1 10E9/L (ref 0–0.7)
EOSINOPHIL NFR BLD AUTO: 1.5 %
ERYTHROCYTE [DISTWIDTH] IN BLOOD BY AUTOMATED COUNT: 12 % (ref 10–15)
ERYTHROCYTE [SEDIMENTATION RATE] IN BLOOD BY WESTERGREN METHOD: 9 MM/H (ref 0–30)
HCT VFR BLD AUTO: 40.5 % (ref 35–47)
HGB BLD-MCNC: 13.7 G/DL (ref 11.7–15.7)
IMM GRANULOCYTES # BLD: 0 10E9/L (ref 0–0.4)
IMM GRANULOCYTES NFR BLD: 0.2 %
LYMPHOCYTES # BLD AUTO: 1 10E9/L (ref 0.8–5.3)
LYMPHOCYTES NFR BLD AUTO: 19.5 %
MCH RBC QN AUTO: 31.1 PG (ref 26.5–33)
MCHC RBC AUTO-ENTMCNC: 33.8 G/DL (ref 31.5–36.5)
MCV RBC AUTO: 92 FL (ref 78–100)
MONOCYTES # BLD AUTO: 0.4 10E9/L (ref 0–1.3)
MONOCYTES NFR BLD AUTO: 6.6 %
NEUTROPHILS # BLD AUTO: 3.7 10E9/L (ref 1.6–8.3)
NEUTROPHILS NFR BLD AUTO: 70.7 %
NRBC # BLD AUTO: 0 10*3/UL
NRBC BLD AUTO-RTO: 0 /100
PLATELET # BLD AUTO: 198 10E9/L (ref 150–450)
RBC # BLD AUTO: 4.41 10E12/L (ref 3.8–5.2)
WBC # BLD AUTO: 5.3 10E9/L (ref 4–11)

## 2021-03-15 PROCEDURE — 82784 ASSAY IGA/IGD/IGG/IGM EACH: CPT | Performed by: INTERNAL MEDICINE

## 2021-03-15 PROCEDURE — 36415 COLL VENOUS BLD VENIPUNCTURE: CPT | Performed by: INTERNAL MEDICINE

## 2021-03-15 PROCEDURE — 85730 THROMBOPLASTIN TIME PARTIAL: CPT | Mod: GZ | Performed by: INTERNAL MEDICINE

## 2021-03-15 PROCEDURE — 86334 IMMUNOFIX E-PHORESIS SERUM: CPT | Mod: 26 | Performed by: PATHOLOGY

## 2021-03-15 PROCEDURE — 85025 COMPLETE CBC W/AUTO DIFF WBC: CPT | Performed by: INTERNAL MEDICINE

## 2021-03-15 PROCEDURE — 99204 OFFICE O/P NEW MOD 45 MIN: CPT | Performed by: INTERNAL MEDICINE

## 2021-03-15 PROCEDURE — 999N001035 HC STATISTIC THROMBIN TIME NC: Performed by: INTERNAL MEDICINE

## 2021-03-15 PROCEDURE — 85613 RUSSELL VIPER VENOM DILUTED: CPT | Performed by: INTERNAL MEDICINE

## 2021-03-15 PROCEDURE — 85652 RBC SED RATE AUTOMATED: CPT | Performed by: INTERNAL MEDICINE

## 2021-03-15 PROCEDURE — 82595 ASSAY OF CRYOGLOBULIN: CPT | Performed by: INTERNAL MEDICINE

## 2021-03-15 PROCEDURE — 86140 C-REACTIVE PROTEIN: CPT | Performed by: INTERNAL MEDICINE

## 2021-03-15 PROCEDURE — 86146 BETA-2 GLYCOPROTEIN ANTIBODY: CPT | Performed by: INTERNAL MEDICINE

## 2021-03-15 PROCEDURE — 86038 ANTINUCLEAR ANTIBODIES: CPT | Performed by: INTERNAL MEDICINE

## 2021-03-15 PROCEDURE — 86431 RHEUMATOID FACTOR QUANT: CPT | Performed by: INTERNAL MEDICINE

## 2021-03-15 PROCEDURE — 86147 CARDIOLIPIN ANTIBODY EA IG: CPT | Performed by: INTERNAL MEDICINE

## 2021-03-15 PROCEDURE — 85379 FIBRIN DEGRADATION QUANT: CPT | Performed by: INTERNAL MEDICINE

## 2021-03-15 PROCEDURE — 86334 IMMUNOFIX E-PHORESIS SERUM: CPT | Mod: TC | Performed by: INTERNAL MEDICINE

## 2021-03-15 PROCEDURE — G0463 HOSPITAL OUTPT CLINIC VISIT: HCPCS

## 2021-03-15 PROCEDURE — 86334 IMMUNOFIX E-PHORESIS SERUM: CPT | Performed by: INTERNAL MEDICINE

## 2021-03-15 PROCEDURE — 999N001023 HC STATISTIC INR NC: Performed by: INTERNAL MEDICINE

## 2021-03-15 PROCEDURE — 86039 ANTINUCLEAR ANTIBODIES (ANA): CPT | Performed by: INTERNAL MEDICINE

## 2021-03-15 RX ORDER — AMITRIPTYLINE HYDROCHLORIDE 10 MG/1
10 TABLET ORAL
COMMUNITY
Start: 2020-11-16 | End: 2021-10-26

## 2021-03-15 RX ORDER — MULTIVITAMIN,THERAPEUTIC
1 TABLET ORAL
COMMUNITY
End: 2022-05-11

## 2021-03-15 RX ORDER — CLOBETASOL PROPIONATE 0.5 MG/G
OINTMENT TOPICAL
COMMUNITY
Start: 2021-01-26 | End: 2022-09-28

## 2021-03-15 RX ORDER — CHLORAL HYDRATE 500 MG
2 CAPSULE ORAL
COMMUNITY

## 2021-03-15 RX ORDER — CHLORHEXIDINE GLUCONATE ORAL RINSE 1.2 MG/ML
SOLUTION DENTAL
COMMUNITY
Start: 2020-10-07 | End: 2022-05-11

## 2021-03-15 RX ORDER — CARBOXYMETHYLCELLULOSE SODIUM 5 MG/ML
SOLUTION/ DROPS OPHTHALMIC
COMMUNITY

## 2021-03-15 RX ORDER — CLINDAMYCIN HCL 300 MG
CAPSULE ORAL
COMMUNITY
Start: 2020-10-07 | End: 2022-05-11

## 2021-03-15 RX ORDER — AMITRIPTYLINE HYDROCHLORIDE 10 MG/1
10 TABLET ORAL AT BEDTIME
COMMUNITY
Start: 2021-03-07 | End: 2021-10-26 | Stop reason: ALTCHOICE

## 2021-03-15 RX ORDER — CETIRIZINE HYDROCHLORIDE 10 MG/1
10 TABLET ORAL
COMMUNITY
End: 2022-04-06

## 2021-03-15 RX ORDER — BIOTIN/SILICON DIOX/L-CYSTEINE 5000MCG-10
1 TABLET, EXTENDED RELEASE ORAL
COMMUNITY
End: 2022-05-11

## 2021-03-15 ASSESSMENT — MIFFLIN-ST. JEOR: SCORE: 1300.6

## 2021-03-15 NOTE — PROGRESS NOTES
INITIAL VASCULAR MEDICINE CONSULTATION  Referring MD: Dr. Amanda Mendoza  Referring reason: Chillblains          Mohamud Mondragon is a 70 year old female who is presenting at the current time to discuss her diagnosi(es) of     Chilblains      HPI: Mohamud Mondragon is a 70 year old female presenting for intial Vascular Medicine consultation due to c/o thermally medicated vasospasm beginning this winter. She notes no pain with her sxs. She notes she will develop a violaceous hue to her toes w/ exposure to environmentally cooler temperatures. No joint pain. No other sxs to suggest autoimmune ds. Feet always feel cool. No claudication. Runs two miles daily. Notes sxs worse after running with laura temperatures. No tissue loss. Saw a dermatologist , was dxd with Chillblains. No maculear compoinent however, Was given NTG ointment which has not helped..        Review Of Systems  Skin: negative  Eyes: negative  Ears/Nose/Throat: negative  Respiratory: No shortness of breath, dyspnea on exertion, cough, or hemoptysis  Cardiovascular: as above  Gastrointestinal: negative  Genitourinary: negative  Musculoskeletal: negative  Neurologic: negative  Psychiatric: negative  Hematologic/Lymphatic/Immunologic: negative  Endocrine: negative      PAST MEDICAL HISTORY:                No past medical history on file.    PAST SURGICAL HISTORY:                No past surgical history on file.    CURRENT MEDICATIONS:                  Current Outpatient Medications   Medication Sig Dispense Refill     amitriptyline (ELAVIL) 10 MG tablet Take 10 mg by mouth       amitriptyline (ELAVIL) 10 MG tablet Take 10 mg by mouth At Bedtime       aspirin (ASA) 81 MG EC tablet Take 81 mg by mouth       carboxymethylcellulose (REFRESH PLUS) 0.5 % SOLN ophthalmic solution        cetirizine (ZYRTEC) 10 MG tablet Take 10 mg by mouth       chlorhexidine (PERIDEX) 0.12 % solution RINSE MOUTH TWICE DAILY STARTING 2 DAYS PRIOR TO APPOINTMENT       clindamycin (CLEOCIN)  300 MG capsule TAKE 1 CAPSULE BY MOUTH EVERY 6 HOURS STARTING 2 DAYS BEFORE APPOINTMENT       clobetasol (TEMOVATE) 0.05 % external ointment        fish oil-omega-3 fatty acids 1000 MG capsule Take 2 g by mouth       melatonin 5 MG tablet Take 1 tablet by mouth       Multiple Vitamins-Minerals (ONCOVITE) TABS Take 1 tablet by mouth       Specialty Vitamins Products (BRITTANEY MATRIX 5000) TABS Take 1 tablet by mouth         ALLERGIES:                  Allergies   Allergen Reactions     Cephalosporins Rash     Other reaction(s): *Unknown     Erythromycin Rash     Rash per patient.  Rash per patient.       Penicillins Rash     Rash per patient.  Rash per patient.         SOCIAL HISTORY:                  Social History     Socioeconomic History     Marital status:      Spouse name: Not on file     Number of children: Not on file     Years of education: Not on file     Highest education level: Not on file   Occupational History     Not on file   Social Needs     Financial resource strain: Not on file     Food insecurity     Worry: Not on file     Inability: Not on file     Transportation needs     Medical: Not on file     Non-medical: Not on file   Tobacco Use     Smoking status: Not on file   Substance and Sexual Activity     Alcohol use: Not on file     Drug use: Not on file     Sexual activity: Not on file   Lifestyle     Physical activity     Days per week: Not on file     Minutes per session: Not on file     Stress: Not on file   Relationships     Social connections     Talks on phone: Not on file     Gets together: Not on file     Attends [employer]ist service: Not on file     Active member of club or organization: Not on file     Attends meetings of clubs or organizations: Not on file     Relationship status: Not on file     Intimate partner violence     Fear of current or ex partner: Not on file     Emotionally abused: Not on file     Physically abused: Not on file     Forced sexual activity: Not on file   Other  Topics Concern     Not on file   Social History Narrative     Not on file       FAMILY HISTORY:                 No family history on file.      Physical exam Reveals:    O/P: WNL  HEENT: WNL  NECK: No JVD, thyromegaly, or lymphadenopathy  HEART: RRR, no murmurs, gallops, or rubs  LUNGS: CTA bilaterally without rales, wheezes, or rhonchi  GI: NABS, nondistended, nontender, soft  EXT:without cyanosis, clubbing, or edema; left foot > right - has violaceous toes  NEURO: nonfocal  : no flank tenderness    thre eplus easily palapble DP and PT pulses.      A/P:    (I73.00) Raynaud's phenomenon without gangrene  (primary encounter diagnosis)  Comment: Reassurance. Not having pain. She was offered but decided not to use nifedipine. Given lack of pain this is appropriate.   Plan: Erythrocyte sedimentation rate auto, CRP         inflammation, Rheumatoid factor, Anti Nuclear         Courtney IgG by IFA with Reflex, CBC with platelets         differential, D dimer quantitative, Cardiolipin        Courtney IgG and IgM, Beta 2 Glycoprotein Antibodies        IGG IGM, Lupus Anticoagulant Panel, Protein         Immunofixation Serum, Cryoglobulin         identification        Check above labs, RTC two weeks for remote telephone visit to go over results. RTC o/w for claudication or nonhealing ulcers.       Greater than one half the  60 minutes total spent on the pt's visit were spent providing education and counselling to the patient regarding the above matters.I was not logged into her chart for the entire period of time spent oin the visit today.

## 2021-03-15 NOTE — PROGRESS NOTES
"Mohamud Mondragon is a 70 year old female who presents for:  Chief Complaint   Patient presents with     RECHECK     [id number] - Chillblains        Vitals:    Vitals:    03/15/21 1123 03/15/21 1126   BP: 135/80 (!) 150/78   BP Location: Right arm Left arm   Patient Position: Chair Chair   Cuff Size: Adult Regular Adult Regular   Pulse: 82    Resp: 16    SpO2: 94%    Weight: 159 lb (72.1 kg)    Height: 5' 9\" (1.753 m)        BMI:  Estimated body mass index is 23.48 kg/m  as calculated from the following:    Height as of this encounter: 5' 9\" (1.753 m).    Weight as of this encounter: 159 lb (72.1 kg).    Pain Score:  Data Unavailable        Roberto Llanos, [facility]    "

## 2021-03-16 LAB
ANA PAT SER IF-IMP: ABNORMAL
ANA SER QL IF: ABNORMAL
ANA TITR SER IF: ABNORMAL {TITER}
B2 GLYCOPROT1 IGG SERPL IA-ACNC: 1.3 U/ML
B2 GLYCOPROT1 IGM SERPL IA-ACNC: 3.8 U/ML
CARDIOLIPIN ANTIBODY IGG: <1.6 GPL-U/ML (ref 0–19.9)
CARDIOLIPIN ANTIBODY IGM: 1 MPL-U/ML (ref 0–19.9)
IGA SERPL-MCNC: 233 MG/DL (ref 84–499)
IGG SERPL-MCNC: 1226 MG/DL (ref 610–1616)
IGM SERPL-MCNC: 86 MG/DL (ref 35–242)
PROT PATTERN SERPL IFE-IMP: NORMAL
RHEUMATOID FACT SER NEPH-ACNC: <7 IU/ML (ref 0–20)

## 2021-03-17 ENCOUNTER — TELEPHONE (OUTPATIENT)
Dept: OTHER | Facility: CLINIC | Age: 71
End: 2021-03-17

## 2021-03-17 DIAGNOSIS — I73.00 RAYNAUD'S PHENOMENON WITHOUT GANGRENE: Primary | ICD-10-CM

## 2021-03-17 LAB — LA PPP-IMP: NEGATIVE

## 2021-03-17 NOTE — TELEPHONE ENCOUNTER
Follow up to 3/15/21    Please arrange for:      Virtual visit in 3-4 weeks to discuss labs drawn 3/15/21.    Yeni Soliman RN BSN  [facility]  [phone]

## 2021-03-20 ENCOUNTER — AMBULATORY - HEALTHEAST (OUTPATIENT)
Dept: NURSING | Facility: CLINIC | Age: 71
End: 2021-03-20

## 2021-03-22 LAB — CRYOGLOB SER QL: NEGATIVE %

## 2021-03-23 ENCOUNTER — COMMUNICATION - HEALTHEAST (OUTPATIENT)
Dept: SCHEDULING | Facility: CLINIC | Age: 71
End: 2021-03-23

## 2021-03-23 LAB — CRYOGLOB IGA & IGG & IGM SER-IMP: NORMAL

## 2021-04-08 NOTE — PROGRESS NOTES
[phone] *Telephone appt, not video appt* Follow up to 3/15/21. Virtual visit to discuss labs drawn 3/15/21

## 2021-04-12 ENCOUNTER — VIRTUAL VISIT (OUTPATIENT)
Dept: OTHER | Facility: CLINIC | Age: 71
End: 2021-04-12
Attending: INTERNAL MEDICINE
Payer: COMMERCIAL

## 2021-04-12 DIAGNOSIS — I73.00 RAYNAUD'S PHENOMENON WITHOUT GANGRENE: ICD-10-CM

## 2021-04-12 PROCEDURE — 99213 OFFICE O/P EST LOW 20 MIN: CPT | Mod: 95 | Performed by: INTERNAL MEDICINE

## 2021-04-12 NOTE — PROGRESS NOTES
Mohamud is a 71 year old who is being evaluated via a billable telephone visit.      What phone number would you like to be contacted at? Home phone number [phone]  How would you like to obtain your AVS? [employer]      Phone call duration:

## 2021-04-12 NOTE — PROGRESS NOTES
Mohamud Mondragon is a 71 year old female who is presenting at the current time to discuss her diagnosi(es) of     Raynaud's phenomenon without gangrene .    HPI: Mohamud Mondragon is a 70 year old female presenting for intial Vascular Medicine consultation due to c/o thermally medicated vasospasm beginning this winter. She notes no pain with her sxs. She notes she will develop a violaceous hue to her toes w/ exposure to environmentally cooler temperatures. No joint pain. No other sxs to suggest autoimmune ds. Feet always feel cool. No claudication. Runs two miles daily. Notes sxs worse after running with laura temperatures. No tissue loss. Saw a dermatologist , was dxd with Chillblains. No maculear compoinent however, Was given NTG ointment which has not helped. When last seen, since not having pain , she declined nifedipine. Labs were checked to include Erythrocyte sedimentation rate auto, CRP inflammation, Rheumatoid factor, Anti Nuclear Courtney IgG by IFA with Reflex, CBC with platelets differential, D dimer quantitative, Cardiolipin Courtney IgG and IgM, Beta 2 Glycoprotein Antibodies IGG IGM, Lupus Anticoagulant Panel, Protein Immunofixation Serum, Cryoglobulin identification. These were all normal as delineated below except for the CAS which was only mildly positive @ 1:80 speckled pattern. No other signs of SLE.     Review Of Systems  Skin: negative  Eyes: negative  Ears/Nose/Throat: negative  Respiratory: No shortness of breath, dyspnea on exertion, cough, or hemoptysis  Cardiovascular: negative  Gastrointestinal: negative  Genitourinary: negative  Musculoskeletal: negative  Neurologic: negative  Psychiatric: negative  Hematologic/Lymphatic/Immunologic: negative  Endocrine: negative    PAST MEDICAL HISTORY:                No past medical history on file.    PAST SURGICAL HISTORY:                No past surgical history on file.    CURRENT MEDICATIONS:                  Current Outpatient Medications   Medication Sig Dispense  Refill     amitriptyline (ELAVIL) 10 MG tablet Take 10 mg by mouth       amitriptyline (ELAVIL) 10 MG tablet Take 10 mg by mouth At Bedtime       aspirin (ASA) 81 MG EC tablet Take 81 mg by mouth       carboxymethylcellulose (REFRESH PLUS) 0.5 % SOLN ophthalmic solution        cetirizine (ZYRTEC) 10 MG tablet Take 10 mg by mouth       chlorhexidine (PERIDEX) 0.12 % solution RINSE MOUTH TWICE DAILY STARTING 2 DAYS PRIOR TO APPOINTMENT       clindamycin (CLEOCIN) 300 MG capsule TAKE 1 CAPSULE BY MOUTH EVERY 6 HOURS STARTING 2 DAYS BEFORE APPOINTMENT       clobetasol (TEMOVATE) 0.05 % external ointment        fish oil-omega-3 fatty acids 1000 MG capsule Take 2 g by mouth       melatonin 5 MG tablet Take 1 tablet by mouth       Multiple Vitamins-Minerals (ONCOVITE) TABS Take 1 tablet by mouth       Specialty Vitamins Products (BRITTANEY MATRIX 5000) TABS Take 1 tablet by mouth         ALLERGIES:                  Allergies   Allergen Reactions     Cephalosporins Rash     Other reaction(s): *Unknown     Erythromycin Rash     Rash per patient.  Rash per patient.       Penicillins Rash     Rash per patient.  Rash per patient.         SOCIAL HISTORY:                  Social History     Socioeconomic History     Marital status:      Spouse name: Not on file     Number of children: Not on file     Years of education: Not on file     Highest education level: Not on file   Occupational History     Not on file   Social Needs     Financial resource strain: Not on file     Food insecurity     Worry: Not on file     Inability: Not on file     Transportation needs     Medical: Not on file     Non-medical: Not on file   Tobacco Use     Smoking status: Not on file   Substance and Sexual Activity     Alcohol use: Not on file     Drug use: Not on file     Sexual activity: Not on file   Lifestyle     Physical activity     Days per week: Not on file     Minutes per session: Not on file     Stress: Not on file   Relationships     Social  connections     Talks on phone: Not on file     Gets together: Not on file     Attends Hinduism service: Not on file     Active member of club or organization: Not on file     Attends meetings of clubs or organizations: Not on file     Relationship status: Not on file     Intimate partner violence     Fear of current or ex partner: Not on file     Emotionally abused: Not on file     Physically abused: Not on file     Forced sexual activity: Not on file   Other Topics Concern     Not on file   Social History Narrative     Not on file       FAMILY HISTORY:                 No family history on file.        Physical exam was not performed as it was a billable telephone encounter due to COVID.     Component      Latest Ref Rng & Units 3/15/2021   WBC      4.0 - 11.0 10e9/L 5.3   RBC Count      3.8 - 5.2 10e12/L 4.41   Hemoglobin      11.7 - 15.7 g/dL 13.7   Hematocrit      35.0 - 47.0 % 40.5   MCV      78 - 100 fl 92   MCH      26.5 - 33.0 pg 31.1   MCHC      31.5 - 36.5 g/dL 33.8   RDW      10.0 - 15.0 % 12.0   Platelet Count      150 - 450 10e9/L 198   Diff Method       Automated Method   % Neutrophils      % 70.7   % Lymphocytes      % 19.5   % Monocytes      % 6.6   % Eosinophils      % 1.5   % Basophils      % 1.5   % Immature Granulocytes      % 0.2   Nucleated RBCs      0 /100 0   Absolute Neutrophil      1.6 - 8.3 10e9/L 3.7   Absolute Lymphocytes      0.8 - 5.3 10e9/L 1.0   Absolute Monocytes      0.0 - 1.3 10e9/L 0.4   Absolute Eosinophils      0.0 - 0.7 10e9/L 0.1   Absolute Basophils      0.0 - 0.2 10e9/L 0.1   Abs Immature Granulocytes      0 - 0.4 10e9/L 0.0   Absolute Nucleated RBC       0.0   Immunofixation ELP       No monoclonal protein seen on immunofixation.  Pathological significance requires clinical . . .   IGG      610 - 1,616 mg/dL 1,226   IGA      84 - 499 mg/dL 233   IGM      35 - 242 mg/dL 86   CAS interpretation      NEG:Negative Borderline Positive (A)   CAS pattern 1       SPECKLED   CAS  titer 1       1:80   Cardiolipin Antibody IgG      0.0 - 19.9 GPL-U/mL <1.6   Cardiolipin Antibody IgM      0.0 - 19.9 MPL-U/mL 1.0   Beta 2 Glycoprotein 1 Antibody IgG      <7 U/mL 1.3   Beta 2 Glycoprotein 1 Antibody IgM      <7 U/mL 3.8   Cryo ID Interp       Cryoglobulin identification not performed on specimens with a negative cryoglobulin . . .   Lupus Result      NEG:Negative Negative   D-Dimer      0.0 - 0.50 ug/ml FEU <0.3   Sed Rate      0 - 30 mm/h 9   CRP Inflammation      0.0 - 8.0 mg/L <2.9   Rheumatoid Factor      <12 IU/mL <7   Cryoglobulin      NEG:Negative % Negative       A/P:        (I73.00) Raynaud's phenomenon without gangrene  (primary encounter diagnosis)  Comment: Reassurance regarding normal labs and that CAS mild elevation is likely a false positive due tot he aging process, and is not itself an indication that she has lupus . Not having pain. She was offered in the past but decided  to use nifedipine. Given lack of pain this is appropriate.   Plan: RTC prn

## 2021-04-26 ENCOUNTER — RECORDS - HEALTHEAST (OUTPATIENT)
Dept: ADMINISTRATIVE | Facility: OTHER | Age: 71
End: 2021-04-26

## 2021-05-01 ENCOUNTER — HEALTH MAINTENANCE LETTER (OUTPATIENT)
Age: 71
End: 2021-05-01

## 2021-05-25 ENCOUNTER — RECORDS - HEALTHEAST (OUTPATIENT)
Dept: ADMINISTRATIVE | Facility: CLINIC | Age: 71
End: 2021-05-25

## 2021-05-26 ENCOUNTER — RECORDS - HEALTHEAST (OUTPATIENT)
Dept: ADMINISTRATIVE | Facility: CLINIC | Age: 71
End: 2021-05-26

## 2021-05-27 ENCOUNTER — RECORDS - HEALTHEAST (OUTPATIENT)
Dept: ADMINISTRATIVE | Facility: CLINIC | Age: 71
End: 2021-05-27

## 2021-05-27 NOTE — PROGRESS NOTES
ASSESSMENT AND PLAN:  Mohamud Mondragon 69 y.o. female is seen here on 04/09/19 for evaluation of positive CAS, 1: 80, done as a workup for a skin eruption, the biopsies differential diagnoses including lupus, has no features suggestive of systemic lupus erythematosus, her cutaneous lesions have resolved without scarring or leaving pigmentation.  We discussed the significance of a positive CAS and its limitations.  The likelihood that she has a systemic connective tissue disease such as SLE appears to be remote.  Further workup as noted this will be done at the time of her upcoming physical at her primary physician's office.  She will return here in 3 months or sooner.  Should she have a recurrence of rash she will take pictures.  I have given her [employer] handout on systemic lupus erythematosus for her to review.    Diagnoses and all orders for this visit:    CAS positive  -     Rheumatoid Factor Quant  -     CCP Antibodies  -     Antinuclear Antibody (CAS) Cascade  -     Anti-Neutrophil Cytoplasmic Antibody, IgG (ANCA IFA)  -     Complement, C'4  -     Complement, C'3    Rash      HISTORY OF PRESENTING ILLNESS:  Mohamud Mondragon, 69 y.o., female is here for evaluation positive CAS, this was done at her dermatologist's office, it was a weakly positive CAS at 1: 80 done on 3/20/2019 as a part of workup for a rash that had erupted on her chest affecting her back upper extremities and lower extremities, the biopsy report of which is reviewed demonstrating perifollicular and superficial perivascular lymphocytic inflammation dermal edema and increased dermal mucin, but the differential diagnosis of connective tissue disease such as lupus, papular mucinosis and a drug reaction.  She reports that that has cleared.  It has gotten better without leaving a trace of rash or pigmentary changes.  She reports no mouth ulcers.  She has not had photosensitivity.  The only joint area concern she has is that of the  "left middle finger symptoms that sound like she had triggering but that has resolved spontaneously.  In addition some time ago she had discomfort in her knee on the left side where she was informed by her primary physician that she might have \"arthritis\".  She has little evidence to suggest inflammatory joint disease.  She has not had pleuritic symptoms.  No history of DVT PE seizure disorder renal impairment repeated miscarriages.  She has not had features suggestive of Raynaud's.  She has normal hemoglobin white cell count and lymphocyte counts.  Her creatinine is normal.  She describes herself otherwise in good health.  She exercises regularly.  In fact she wondered if her rash might be a sweat rash.  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  She describes no weight changes, no history of fatigue.  She has had dryness of nose and history of heart murmur, easy bruising.  She does not smoke does not take alcohol.  She has had hysterectomy and hip fracture left side as a part of an accident.   Further historical information and ADL limitations as noted in the multidimensional health assessment questionnaire attached in the EMR. Rest of the 13 system ROS is negative.     ALLERGIES:Cephalosporins; Erythromycin base; and Penicillins    PAST MEDICAL/ACTIVE PROBLEMS/MEDICATION/ FAMILY HISTORY/SOCIAL DATA:  The patient has a family history of  Past Medical History:   Diagnosis Date     Allergy to dogs     - 1/2013: no allergy symptoms and not using antihistamines     CAROTID U/S-doppler wnl 5/2007    NORMAL carotid u/s 5/4/07 at [place]. Done because of symptoms of vertigo/dizziness     Chickenpox childhood    h/o chickenpox in childhood per patient report     Closed fracture of head of left radius 8/2013    - 8/26/13:  fell on her; L radial head fracture; Dr Engel of [facility] recommended conservative management (also had left hip fracture " s/p surgical repair by Dr Magana of [facility])     Closed fracture of unspecified part of neck of femur 8/2013    - 8/26/13 L hip fracture (L proximal femur),  fell on her (also , L radial fracture, conservatively managed); - 8/27/13 Dr Sonu Magana ([facility]), [place]'s: ORIF (intramedullary fixation) L hip     ECHOCARDIOGRAM (TTE) wnl 5/2007    - 5/4/07 TTE: NORMAL, EF60% (done because of vertigo/dizzy symptoms)        H/O colonoscopy 06/2016    normal, repeat in 10 years = 2026     History of hepatitis A vaccination     Completed Hep A series per patient report documented in Dr. Carvalho's notes 8/12/05; patient declines testing for immunity 2/13/10//sds     History of hepatitis B vaccination     Completed Hep B series per patient report documented in Dr. Carvalho's notes 8/12/05; patient declines testing for immunity 2/13/10//sds     hx Breast Lump LEFT, BENIGN 9/2007    - 9/20/07 mammo & u/s: ACR2-benign.  U/s = left upper outer quadrant at 1:30 4 cm from nipple demonstrates fibrous ridge; stable, no significant change since 2005; - sister has h/o breast CA        Hx Holter Monitoring wnl 4/2009    - 4/1/09 NORMAL 24 hour holter monitor interpreted by Dr. Crouch at [place]          Hx of low back pain 3/2013    - 3/16/13 NER low back pain after shoveing snow and kick-boxing video; rxd vicodin and naproxen     Hyperbilirubinemia 2005    Mild elevation of bilrubin dating back to 2005. Total bili around 1.1. Unclear etiology. 2016&17=1.6ish          Menarche 14 y/o    Menarche 14 y/o. Regular cycles q 24-26 days, heavy flow 7 days, last period 1987 (then had BARBARA for menorrhagia)     Meniere's disease 11/1/2009    h/o Meniere's Disease. Referred to [facility] by Dr. Knox 11/2009. Seen by [facility] 11/2008. Normal carotid u/s & TTE 5/2007            Menopause mid 50s y/o?    last period 1987 at 46 y/o (BARBARA for menorrhagia), then menopausal symptoms around 53 y/o      Pneumonia, community acquired 10/2007    - 10/2007: [facility] Pneumonia (CAP), uncomplicated     Social History     Tobacco Use   Smoking Status Never Smoker   Smokeless Tobacco Never Used   Tobacco Comment    No passive smoke exposure     Patient Active Problem List   Diagnosis     Cataract     Sprain Of Right Sternoclavicular Ligament     Actinic keratosis     Insomnia     Menopause Has Occurred     Retinal Detachment     Localized Primary Osteoarthritis Of The Right Foot 1st MTP Joint     Dermatitis     Osteoporosis     Foot pain     Atrophic Vaginitis, postmenopausal     Hx of major depression     History of being hospitalized     Urinary incontinence     Current Outpatient Medications   Medication Sig Dispense Refill     biotin-silicon diox-L-cysteine 5,000 mcg-100 mg- 50 mg Tab Take 1 tablet by mouth daily.       calcium carbonate-vitamin D3 600 mg(1,500mg) -200 unit per tablet Take 1 tablet by mouth 2 (two) times a day.       carboxymethylcellulose (REFRESH PLUS) 0.5 % Dpet ophthalmic dropperette        cetirizine (ZYRTEC) 10 MG tablet Take 10 mg by mouth daily.       cholecalciferol, vitamin D3, 2,000 unit Tab Take 1 tablet (2,000 Units total) by mouth daily.       clobetasol (TEMOVATE) 0.05 % ointment Use this medication three times daily on rash until gone 30 g 1     estradiol (ESTRACE) 0.01 % (0.1 mg/gram) vaginal cream Insert 2 g into the vagina as needed. INSERT 1 APPLICATOR 1 TO 2 TIMES WEEKLY AS NEEDED FOR SYMPTOMS OF POSTMENOPAUSAL ATROPHIC VAGINITIS. 127.5 g 4     melatonin 5 mg Tab Take 1 tablet by mouth bedtime.       multivitamin therapeutic (THERAGRAN) tablet Take 1 tablet by mouth daily.       VIT A/VIT C/VIT E/ZINC/COPPER (PRESERVISION AREDS ORAL) Take 2 tablets by mouth.        No current facility-administered medications for this visit.        COMPREHENSIVE EXAMINATION:  Vitals:    04/09/19 1159   BP: 136/64   Patient Site: Right Arm   Patient Position: Sitting   Cuff Size: Adult Large   Pulse: 72  "  Weight: 168 lb (76.2 kg)   Height: 5' 9.78\" (1.772 m)     A well appearing alert oriented female. Vital data as noted above. Her eyes without inflammation/scleromalacia. ENTwithout oral mucositis, thrush, nasal deformity, external ear redness, deformity. Her neck is without lymphadenopathy and supple. Lungs normal sounds, no pleural rub. Heart auscultation normal rate, rhythm; no pericardial rub and murmurs. Abdomen soft, non tender, no organomegaly. Skin examined for heliotrope, malar area eruption, lupus pernio, periungual erythema, sclerodactyly, papules, erythema nodosum, purpura, nail pitting, onycholysis, and obvious psoriasis lesion. Neurological examination shows normal alertness, speech, facial symmetry, tone and power in upper and lower extremities, at wrist and gait. The joint examination is performed for swelling, tenderness, warmth, erythema, and range of motion in the following joints: DIPs, PIPs, MCPs, wrists, first CMC's, elbows, shoulders, hips, knees, ankles, feet; spine for range of motion and paraspinal muscles for tenderness. The salient normal / abnormal findings are appended.  There is no synovitis in any of the palpable joints.  She has minimal A1 nodularity in the left middle finger flexor tendon which is not tender.  She has no sclerodactyly.  There is no Malar area eruption.  There is no pleuropericardial rub..    LAB / IMAGING DATA:  ALT   Date Value Ref Range Status   03/15/2017 26 0 - 45 U/L Final   03/04/2016 32 0 - 45 U/L Final   03/02/2015 18 0 - 45 U/L Final     Albumin   Date Value Ref Range Status   03/15/2017 4.0 3.5 - 5.0 g/dL Final   03/04/2016 4.1 3.5 - 5.0 g/dL Final   03/02/2015 4.1 3.5 - 5.0 g/dL Final     Creatinine   Date Value Ref Range Status   04/13/2018 0.80 0.60 - 1.10 mg/dL Final   09/08/2017 0.91 0.60 - 1.10 mg/dL Final   03/15/2017 0.81 0.60 - 1.10 mg/dL Final       WBC   Date Value Ref Range Status   01/09/2019 6.0 4.0 - 11.0 thou/uL Final   09/08/2017 5.5 4.0 " - 11.0 thou/uL Final   03/02/2015 5.3 4.0 - 11.0 thou/uL Final     Hemoglobin   Date Value Ref Range Status   01/09/2019 13.3 12.0 - 16.0 g/dL Final   09/08/2017 14.0 12.0 - 16.0 g/dL Final   03/15/2017 13.7 12.0 - 16.0 g/dL Final     Platelets   Date Value Ref Range Status   01/09/2019 184 140 - 440 thou/uL Final   09/08/2017 190 140 - 440 thou/uL Final   03/15/2017 176 140 - 440 thou/uL Final       No results found for: CAS, RF, SEDRATE

## 2021-05-28 ENCOUNTER — RECORDS - HEALTHEAST (OUTPATIENT)
Dept: ADMINISTRATIVE | Facility: CLINIC | Age: 71
End: 2021-05-28

## 2021-05-28 NOTE — PROGRESS NOTES
OFFICE VISIT NOTE      Assessment & Plan   Mohamud Mondragon is a 69 y.o. female for annual wellness visit. She is right on top of her preventative care, and we even had a shingles shot to give her. She has had an increase in the number of times she forgets a name or a certain word and is wondering if anything other than age could cause this? She does memory/brain exercises, gets her sleep and eats healthily. Still, we'll check her vit b12 to be sure that is not causing the memory difficulties. I strongly encouraged her to keep up her good habits.    EKG - need a baseline. It is likely that she is bradycardic due to her long history of good exercise.    Osteoporosis - redo bone scan. Continue vit D, calcium (we reviewed how much to get daily) and exercise.    mammo- continue getting 3D mammos.    She is in the midst of being checked/followed by rheumatology for Lupus. She'll continue to follow up there. Right now, she is doing well and not having many bothersome symptoms.    Her son's marriage is still struggling. They are  and his wife wants to get back together. This is hard on Mohamud. Also, Mohamud's  does not take care of himself. His is very overweight and not willing to do the work to change it... He'd rather die early. This really bothers Mohamud, but she realizes she cannot do much about it.      Diagnoses and all orders for this visit:    Encounter for Medicare annual wellness exam  -     Cancel: Mammo Screening Bilateral; Future; Expected date: 04/19/2019    Memory difficulty  -     Vitamin B12; Future; Expected date: 04/20/2019    Bradycardia by electrocardiogram  -     Electrocardiogram Perform and Read    Need for shingles vaccine  -     Varicella Zoster, Recombinant Vaccine IM    Hx of major depression    Adjustment insomnia    Age-related osteoporosis without current pathological fracture        Chhaya Malone MD    I have had an Advance Directives discussion with the patient.  The following are  part of a depression follow up plan for the patient:  coping support assessment and coping support management          Subjective:   Chief Complaint:  Annual Wellness Visit    69 y.o. female.     1) following labs  Knee pain - continues but she keeps exercising and it does not get worse.    2) Sometimes struggles to find a word, 2-3 times per week; her dad had this problem, it never progressed, sometimes names  3) Deals with stress by exerising    4) rash on knees is essentially gone. There is no more itching. She wants to know if she should keep treating her knees? This was one thing the rheumatologist saw. Mohamud will follow up with rheumatology regarding her possible diagnosis of lupus. With all the stress she has had in the past year or so, it is tricky to know if the stress caused some of her problems or if it is the onset of lupus.    Feb - so much snow - her  broke ribs so she had to do everything  He is overweight    Daughter is set for life - lives in [place], [place], she could move there - -4 grandkids  Son - setill , he's lost weight, ? Counseling,     Current Outpatient Medications   Medication Sig     aspirin 81 MG EC tablet Take 81 mg by mouth daily.     biotin-silicon diox-L-cysteine 5,000 mcg-100 mg- 50 mg Tab Take 1 tablet by mouth daily.     calcium carbonate-vitamin D3 600 mg(1,500mg) -200 unit per tablet Take 1 tablet by mouth 2 (two) times a day.     carboxymethylcellulose (REFRESH PLUS) 0.5 % Dpet ophthalmic dropperette      cetirizine (ZYRTEC) 10 MG tablet Take 10 mg by mouth daily.     cholecalciferol, vitamin D3, 2,000 unit Tab Take 1 tablet (2,000 Units total) by mouth daily.     clobetasol (TEMOVATE) 0.05 % ointment Use this medication three times daily on rash until gone     estradiol (ESTRACE) 0.01 % (0.1 mg/gram) vaginal cream Insert 2 g into the vagina as needed. INSERT 1 APPLICATOR 1 TO 2 TIMES WEEKLY AS NEEDED FOR SYMPTOMS OF POSTMENOPAUSAL ATROPHIC VAGINITIS.     melatonin  "5 mg Tab Take 1 tablet by mouth bedtime.     multivitamin therapeutic (THERAGRAN) tablet Take 1 tablet by mouth daily.     omega-3 acid ethyl esters (LOVAZA) 1 gram capsule Take 2 g by mouth 2 (two) times a day.     VIT A/VIT C/VIT E/ZINC/COPPER (PRESERVISION AREDS ORAL) Take 2 tablets by mouth.        PSFHx: appropriate sections of PMH completed/filled in   Tobacco Status:  She  reports that she has never smoked. She has never used smokeless tobacco.    Review of Systems:  No fever.  No rash. All other systems negative except as noted above.    Objective:    /74   Pulse (!) 57   Temp 97.9  F (36.6  C) (Oral)   Resp 12   Ht 5' 9.09\" (1.755 m)   Wt 166 lb 4 oz (75.4 kg)   SpO2 99%   BMI 24.48 kg/m     Physical Exam:  General Appearance: Alert, cooperative, no distress, appears stated age  Head: Normocephalic, without obvious abnormality, atraumatic  Eyes: PERRL, conjunctiva/corneas clear, EOM's intact  Ears: Normal TM's and external ear canals, both ears  Nose: Nares normal, septum midline,mucosa normal, no drainage  Throat: Lips, mucosa, and tongue normal; teeth and gums normal  Neck: Supple, symmetrical, trachea midline, no adenopathy;  thyroid: not enlarged, symmetric, no tenderness/mass/nodules  Back: Symmetric, no curvature, ROM normal, no CVA tenderness  Lungs: Clear to auscultation bilaterally, respirations unlabored  Breasts: No breast masses, tenderness, asymmetry, or nipple discharge.  Heart: Regular rate and rhythm, S1 and S2 normal, no murmur  Abdomen: Soft, non-tender, bowel sounds active,  no masses, no organomegaly  Pelvic: deferred today, mostly due to time  Extremities: Extremities normal, atraumatic, no cyanosis or edema  Skin: Skin color, texture, turgor normal, no lesions, rash on knees is 99% gone  Neurologic: coordination is smooth, +2/4 DTRs    Labs: reviewed    "

## 2021-05-30 ENCOUNTER — RECORDS - HEALTHEAST (OUTPATIENT)
Dept: ADMINISTRATIVE | Facility: CLINIC | Age: 71
End: 2021-05-30

## 2021-05-30 VITALS — BODY MASS INDEX: 25.05 KG/M2 | WEIGHT: 169.12 LBS | HEIGHT: 69 IN

## 2021-05-30 NOTE — PROGRESS NOTES
ASSESSMENT AND PLAN:  Mohamud Mondragon 69 y.o. female is seen here on 07/09/19 for follow-up of question of if she has SLE, her CAS and tested elsewhere at 1: 80, returned negative at our lab other auto antibodies are negative, she does not have features of systemic lupus erythematosus this is discussed with her and reassured her, her  accompanies her.  This is caused her significant consternation.  She is going to follow-up with her dermatologist or here in case of emergence of the skin lesions initially presented with the differential diagnosis of cutaneous lupus.  She does have osteoarthritis we discussed the management principles.  She is to follow-up here in as-needed basis.        Diagnoses and all orders for this visit:    Polyarthralgia    Bilateral primary osteoarthritis of knee      HISTORY OF PRESENTING ILLNESS:  Mohamud Mondragon, 69 y.o., female is here for follow-up of recent visit for evaluation positive CAS, this was done at her dermatologist's office, it was a weakly positive CAS at 1: 80 done on 3/20/2019 as a part of workup for a rash that had erupted on her chest affecting her back upper extremities and lower extremities, the biopsy report of which is reviewed demonstrating perifollicular and superficial perivascular lymphocytic inflammation dermal edema and increased dermal mucin, but the differential diagnosis of connective tissue disease such as lupus, papular mucinosis and a drug reaction.  She reports that that has cleared.  It has gotten better without leaving a trace of rash or pigmentary changes.  Since her visit she has had a negative in other labs serologic evidence of autoimmunity.  She has not had recurrence of rash.  She has mild discomfort such as in her knees, first MTPs.  She is well described to have osteoarthritis there.  She reports no mouth ulcers.  She has not had photosensitivity.  The only joint area concern she has is that of the left middle finger symptoms that sound like  "she had triggering but that has resolved spontaneously.  In addition some time ago she had discomfort in her knee on the left side where she was informed by her primary physician that she might have \"arthritis\".  She has little evidence to suggest inflammatory joint disease.  She has not had pleuritic symptoms.  No history of DVT PE seizure disorder renal impairment repeated miscarriages.  She has not had features suggestive of Raynaud's.  She has normal hemoglobin white cell count and lymphocyte counts.  Her creatinine is normal.  She describes herself otherwise in good health.  She exercises regularly.  In fact she wondered if her rash might be a sweat rash.  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  She describes no weight changes, no history of fatigue.  She has had dryness of nose and history of heart murmur, easy bruising.  She does not smoke does not take alcohol.  She has had hysterectomy and hip fracture left side as a part of an accident.   Further historical information and ADL limitations as noted in the multidimensional health assessment questionnaire attached in the EMR.   ALLERGIES:Cephalosporins; Erythromycin base; Erythromycin; and Penicillins    PAST MEDICAL/ACTIVE PROBLEMS/MEDICATION/ FAMILY HISTORY/SOCIAL DATA:  The patient has a family history of  Past Medical History:   Diagnosis Date     Allergy to dogs     - 1/2013: no allergy symptoms and not using antihistamines     CAROTID U/S-doppler wnl 5/2007    NORMAL carotid u/s 5/4/07 at [place]. Done because of symptoms of vertigo/dizziness     Chickenpox childhood    h/o chickenpox in childhood per patient report     Closed fracture of head of left radius 8/2013    - 8/26/13:  fell on her; L radial head fracture; Dr Engel of [facility] recommended conservative management (also had left hip fracture s/p surgical repair by Dr Magana of [facility])     Closed fracture of unspecified part of neck of femur " 8/2013    - 8/26/13 L hip fracture (L proximal femur),  fell on her (also , L radial fracture, conservatively managed); - 8/27/13 Dr Sonu Magana ([facility]), [facility]: ORIF (intramedullary fixation) L hip     ECHOCARDIOGRAM (TTE) wnl 5/2007    - 5/4/07 TTE: NORMAL, EF60% (done because of vertigo/dizzy symptoms)        H/O colonoscopy 06/2016    normal, repeat in 10 years = 2026     History of hepatitis A vaccination     Completed Hep A series per patient report documented in Dr. Carvalho's notes 8/12/05; patient declines testing for immunity 2/13/10//sds     History of hepatitis B vaccination     Completed Hep B series per patient report documented in Dr. Carvalho's notes 8/12/05; patient declines testing for immunity 2/13/10//sds     hx Breast Lump LEFT, BENIGN 9/2007    - 9/20/07 mammo & u/s: ACR2-benign.  U/s = left upper outer quadrant at 1:30 4 cm from nipple demonstrates fibrous ridge; stable, no significant change since 2005; - sister has h/o breast CA        Hx Holter Monitoring wnl 4/2009    - 4/1/09 NORMAL 24 hour holter monitor interpreted by Dr. Crouch at [place]          Hx of low back pain 3/2013    - 3/16/13 NER low back pain after shoveing snow and kick-boxing video; rxd vicodin and naproxen     Hyperbilirubinemia 2005    Mild elevation of bilrubin dating back to 2005. Total bili around 1.1. Unclear etiology. 2016&17=1.6ish          Menarche 14 y/o    Menarche 14 y/o. Regular cycles q 24-26 days, heavy flow 7 days, last period 1987 (then had BARBARA for menorrhagia)     Meniere's disease 11/1/2009    h/o Meniere's Disease. Referred to [facility] by Dr. Knox 11/2009. Seen by [facility] 11/2008. Normal carotid u/s & TTE 5/2007            Menopause mid 50s y/o?    last period 1987 at 46 y/o (BARBARA for menorrhagia), then menopausal symptoms around 55 y/o     Osteopenia 2019    no bone density change from 2016-19.     Pneumonia, community acquired 10/2007     - 10/2007: LLL Pneumonia (CAP), uncomplicated     Social History     Tobacco Use   Smoking Status Never Smoker   Smokeless Tobacco Never Used   Tobacco Comment    No passive smoke exposure     Patient Active Problem List   Diagnosis     Cataract     Sprain Of Right Sternoclavicular Ligament     Actinic keratosis     Insomnia     Menopause Has Occurred     Retinal Detachment     Localized Primary Osteoarthritis Of The Right Foot 1st MTP Joint     Intrinsic eczema     Osteoporosis     Foot pain     Atrophic Vaginitis, postmenopausal     Hx of major depression     History of being hospitalized     Urinary incontinence     Current Outpatient Medications   Medication Sig Dispense Refill     aspirin 81 MG EC tablet Take 81 mg by mouth daily.       biotin-silicon diox-L-cysteine 5,000 mcg-100 mg- 50 mg Tab Take 1 tablet by mouth daily.       calcium carbonate-vitamin D3 600 mg(1,500mg) -200 unit per tablet Take 1 tablet by mouth 2 (two) times a day.       carboxymethylcellulose (REFRESH PLUS) 0.5 % Dpet ophthalmic dropperette        cetirizine (ZYRTEC) 10 MG tablet Take 10 mg by mouth daily.       cholecalciferol, vitamin D3, 2,000 unit Tab Take 1 tablet (2,000 Units total) by mouth daily.       estradiol (ESTRACE) 0.01 % (0.1 mg/gram) vaginal cream Insert 2 g into the vagina as needed. INSERT 1 APPLICATOR 1 TO 2 TIMES WEEKLY AS NEEDED FOR SYMPTOMS OF POSTMENOPAUSAL ATROPHIC VAGINITIS. 127.5 g 4     melatonin 5 mg Tab Take 1 tablet by mouth bedtime.       multivitamin therapeutic (THERAGRAN) tablet Take 1 tablet by mouth daily.       omega-3 acid ethyl esters (LOVAZA) 1 gram capsule Take 2 g by mouth 2 (two) times a day.       VIT A/VIT C/VIT E/ZINC/COPPER (PRESERVISION AREDS ORAL) Take 2 tablets by mouth.        clobetasol (TEMOVATE) 0.05 % ointment Use this medication three times daily on rash until gone 30 g 1     No current facility-administered medications for this visit.      DETAILED EXAMINATION  07/09/19   :  Vitals:    07/09/19 1549   BP: 126/66   Patient Site: Right Arm   Patient Position: Sitting   Cuff Size: Adult Regular   Pulse: 68   Weight: 166 lb (75.3 kg)     Alert oriented. Head including the face is examined for malar rash, heliotropes, scarring, lupus pernio. Eyes examined for redness such as in episcleritis/scleritis, periorbital lesions.   Neck/ Face examined for parotid gland swelling, range of motion of neck.  Left upper and lower and right upper and lower extremities examined for tenderness, swelling, warmth of the appendicular joints, range of motion, edema, rash.  Some of the important findings included:She does not have evidence of synovitis in any of the palpable joints of the upper extremities.  No significant deformities of the digits.  No JLT effusion or warmth of the knees. She has minimal A1 nodularity in the left middle finger flexor tendon which is not tender.  She has no sclerodactyly.  There is no Malar area eruption.     LAB / IMAGING DATA:  ALT   Date Value Ref Range Status   03/15/2017 26 0 - 45 U/L Final   03/04/2016 32 0 - 45 U/L Final   03/02/2015 18 0 - 45 U/L Final     Albumin   Date Value Ref Range Status   03/15/2017 4.0 3.5 - 5.0 g/dL Final   03/04/2016 4.1 3.5 - 5.0 g/dL Final   03/02/2015 4.1 3.5 - 5.0 g/dL Final     Creatinine   Date Value Ref Range Status   04/16/2019 0.86 0.60 - 1.10 mg/dL Final   04/13/2018 0.80 0.60 - 1.10 mg/dL Final   09/08/2017 0.91 0.60 - 1.10 mg/dL Final       WBC   Date Value Ref Range Status   04/16/2019 3.8 (L) 4.0 - 11.0 thou/uL Final   01/09/2019 6.0 4.0 - 11.0 thou/uL Final   03/02/2015 5.3 4.0 - 11.0 thou/uL Final     Hemoglobin   Date Value Ref Range Status   04/16/2019 13.3 12.0 - 16.0 g/dL Final   01/09/2019 13.3 12.0 - 16.0 g/dL Final   09/08/2017 14.0 12.0 - 16.0 g/dL Final     Platelets   Date Value Ref Range Status   04/16/2019 170 140 - 440 thou/uL Final   01/09/2019 184 140 - 440 thou/uL Final   09/08/2017 190 140 - 440 thou/uL Final        Lab Results   Component Value Date    RF <15.0 04/16/2019

## 2021-05-31 VITALS — WEIGHT: 175 LBS | BODY MASS INDEX: 25.92 KG/M2 | HEIGHT: 69 IN

## 2021-05-31 VITALS — BODY MASS INDEX: 26.66 KG/M2 | WEIGHT: 180.5 LBS

## 2021-05-31 VITALS — WEIGHT: 175.25 LBS | BODY MASS INDEX: 25.96 KG/M2 | HEIGHT: 69 IN

## 2021-05-31 VITALS — WEIGHT: 176.8 LBS | BODY MASS INDEX: 26.11 KG/M2

## 2021-05-31 VITALS — WEIGHT: 181.6 LBS | BODY MASS INDEX: 26.82 KG/M2

## 2021-05-31 VITALS — BODY MASS INDEX: 26.25 KG/M2 | WEIGHT: 177.25 LBS | HEIGHT: 69 IN

## 2021-05-31 NOTE — TELEPHONE ENCOUNTER
Pt has changed her mind.  She would like to have the blood test for Lyme disease.  Can PCP please order?  See pt Oswald message in her chart. Thanks.

## 2021-05-31 NOTE — TELEPHONE ENCOUNTER
Who is calling:  Patient  Reason for Call:  Patient is wondering whether or not the 2nd shingles shot would be available for her to come in today or tomorrow. Please call patient as soon as possible.   Okay to leave a detailed message: Yes

## 2021-05-31 NOTE — TELEPHONE ENCOUNTER
New Appointment Needed  What is the reason for the visit:  Patient wants to know if the lab has the shingles vaccination before she schedules a lad only appointment.    Provider Preference: Any available  How soon do you need to be seen?: tomorrow  Waitlist offered?: No  Okay to leave a detailed message:  Yes

## 2021-05-31 NOTE — TELEPHONE ENCOUNTER
First Shingrix 04/19/19. No future orders as of yet. If okay for patient to come back for second (final) Shingrix vaccine, please place future order. Thanks.

## 2021-06-01 VITALS — WEIGHT: 165.8 LBS | BODY MASS INDEX: 24.56 KG/M2 | HEIGHT: 69 IN

## 2021-06-01 VITALS — BODY MASS INDEX: 24.4 KG/M2 | WEIGHT: 164.75 LBS | HEIGHT: 69 IN

## 2021-06-01 VITALS — WEIGHT: 168 LBS | BODY MASS INDEX: 24.81 KG/M2

## 2021-06-02 ENCOUNTER — RECORDS - HEALTHEAST (OUTPATIENT)
Dept: ADMINISTRATIVE | Facility: CLINIC | Age: 71
End: 2021-06-02

## 2021-06-02 VITALS — BODY MASS INDEX: 24.06 KG/M2 | HEIGHT: 70 IN | WEIGHT: 168.04 LBS

## 2021-06-02 VITALS — BODY MASS INDEX: 24.62 KG/M2 | HEIGHT: 69 IN | WEIGHT: 166.25 LBS

## 2021-06-02 VITALS — BODY MASS INDEX: 24.05 KG/M2 | HEIGHT: 70 IN | WEIGHT: 168 LBS

## 2021-06-02 VITALS — HEIGHT: 70 IN | BODY MASS INDEX: 23.49 KG/M2 | WEIGHT: 164.08 LBS

## 2021-06-02 VITALS — WEIGHT: 163.12 LBS | HEIGHT: 69 IN | BODY MASS INDEX: 24.16 KG/M2

## 2021-06-03 VITALS — WEIGHT: 166 LBS | BODY MASS INDEX: 24.45 KG/M2

## 2021-06-05 VITALS
HEART RATE: 78 BPM | WEIGHT: 158 LBS | SYSTOLIC BLOOD PRESSURE: 144 MMHG | HEIGHT: 69 IN | RESPIRATION RATE: 18 BRPM | TEMPERATURE: 98 F | DIASTOLIC BLOOD PRESSURE: 72 MMHG | OXYGEN SATURATION: 97 % | BODY MASS INDEX: 23.4 KG/M2

## 2021-06-07 NOTE — TELEPHONE ENCOUNTER
"Triage call:  Patient call with \"question about the Coronavirus\"    Reason for Disposition    Information only question and nurse able to answer    Answer Assessment - Initial Assessment Questions  1. REASON FOR CALL or QUESTION: \"What is your reason for calling today?\" or \"How can I best help you?\" or \"What question do you have that I can help answer?\"      Patient with question about COVID-19. Patient asking who would be best to go tho grocery store between her or her . Patient reported that they did have someone available to do shoping but was \"unconvienient\". Education provided about hand hygeine, social distancing, wiping down carts and items once home. Also discussed that some stores are offering hours for seniors. Patient reports she had no further questions and ended call.    Protocols used: NO PROTOCOL AVAILABLE - INFORMATION ONLY-A-[place]    Sunitha Chavez RN [facility] 4/1/2020 10:49 AM      "

## 2021-06-09 NOTE — PROGRESS NOTES
Assessment:      Healthy female exam.    Normal weight! - this is a triumph for her - she's been exercising more to achieve this; it'll also help her osteoporosis  Occasional dizziness - she'll self monitor and let me know if this becomes worse/more frequent; she has a past work up  Urinary incontinence - refer for PT as she is willing to try that and does not want medication  Vit D - stable on current supplement  Osteoporosis - is off fosamax, taking vit D and Calcium  Lipids - great with exercise and weight loss  Breast exam fine - mammogram next year  Advanced directive - has one  Hepatitis C - she asked if she should have this done; I explained the risk factors after which she said she did not need it. I let her know we could still check it OR she can donate blood.  Bili - continued with slight elevation but not continuous rise - monitor       Plan:       All questions answered.  Breast self exam technique reviewed and patient encouraged to perform self-exam monthly.  Diagnosis explained in detail, including differential.  Discussed healthy lifestyle modifications.     Subjective:      Mohamud Mondragon is a 66 y.o. female who presents for an annual exam. The patient is not sexually active. The patient participates in regular exercise: yes. The patient reports that there is not domestic violence in her life. Overall she feels well and is happy with her increased exercise AND weight loss. However, she was dizzy x 2 this past Sunday - after sitting in [employer] and after lunch. It was only brief.    Healthy Habits:   Regular Exercise: Yes  Sunscreen Use: Yes  Healthy Diet: Yes  Dental Visits Regularly: Yes  Seat Belt: Yes  Sexually active: No  Self Breast Exam Monthly:No  Hemoccults: N/A  Flex Sig: N/A  Colonoscopy: Yes  Lipid Profile: Yes  Glucose Screen: Yes  Prevention of Osteoporosis: Yes  Last Dexa: 4/28/2016  Guns at Home:  Yes  Guns Safety Locks:  Yes and No      Immunization History   Administered Date(s)  Administered     DT (pediatric) 06/15/1999     Influenza, inj, historic 2008     Pneumo Conj 13-V (2010&after) 2015     Pneumo Polysac 23-V 2013     Tdap 2007, 2016     ZOSTER 2015     Immunization status: up to date and documented.    No exam data present    Gynecologic History  No LMP recorded. Patient is postmenopausal.  Contraception: post menopausal status  Last Pap: n/a. Results were: normal  Last mammogram: 3/14/2016. Results were: normal      OB History    Para Term  AB SAB TAB Ectopic Multiple Living   5 5 3 2 0 0 0 0 0 0      # Outcome Date GA Lbr Kavin/2nd Weight Sex Delivery Anes PTL Lv   5 Term            4 Term            3       Vag-Spont      2       Vag-Spont      1 Term      Vag-Spont         Obstetric Comments       x3, no complications 1 daughter (), 2 sons (, )       Current Outpatient Prescriptions   Medication Sig Dispense Refill     biotin-silicon diox-L-cysteine 5,000 mcg-100 mg- 50 mg Tab Take 1 tablet by mouth daily.       calcium carbonate-vitamin D3 600 mg(1,500mg) -200 unit per tablet Take 1 tablet by mouth 2 (two) times a day.       cetirizine (ZYRTEC) 10 MG tablet Take 10 mg by mouth daily.       cholecalciferol, vitamin D3, 2,000 unit Tab Take 1 tablet (2,000 Units total) by mouth daily.       clobetasol (TEMOVATE) 0.05 % ointment        conjugated estrogens (PREMARIN) vaginal cream Insert into the vagina daily. Insert 0.5 gram vaginally once a week       melatonin 5 mg Tab Take 1 tablet by mouth bedtime.       multivitamin therapeutic (THERAGRAN) tablet Take 1 tablet by mouth daily.       VIT A/VIT C/VIT E/ZINC/COPPER (PRESERVISION AREDS ORAL) Take by mouth.       estradiol (ESTRACE) 0.01 % (0.1 mg/gram) vaginal cream Insert 1 applicator 1-2 times weekly as needed for symptoms of postmenopausal atrophic vaginitis 42.5 g prn     No current facility-administered medications for this visit.      Past  Medical History:   Diagnosis Date     Allergy to dogs     - 1/2013: no allergy symptoms and not using antihistamines     CAROTID U/S-doppler wn 5/2007    NORMAL carotid u/s 5/4/07 at [place]. Done because of symptoms of vertigo/dizziness     Chickenpox childhood    h/o chickenpox in childhood per patient report     Closed fracture of head of left radius 8/2013    - 8/26/13:  fell on her; L radial head fracture; Dr Engel of [facility] recommended conservative management (also had left hip fracture s/p surgical repair by Dr Magana of [facility])     Closed fracture of unspecified part of neck of femur 8/2013    - 8/26/13 L hip fracture (L proximal femur),  fell on her (also , L radial fracture, conservatively managed); - 8/27/13 Dr Sonu Magana ([facility]), [facility]: ORIF (intramedullary fixation) L hip     ECHOCARDIOGRAM (TTE) wnl 5/2007    - 5/4/07 TTE: NORMAL, EF60% (done because of vertigo/dizzy symptoms)        H/O colonoscopy 06/2016    normal, repeat in 10 years = 2026     History of hepatitis A vaccination     Completed Hep A series per patient report documented in Dr. Carvalho's notes 8/12/05; patient declines testing for immunity 2/13/10//sds     History of hepatitis B vaccination     Completed Hep B series per patient report documented in Dr. Carvalho's notes 8/12/05; patient declines testing for immunity 2/13/10//sds     hx Breast Lump LEFT, BENIGN 9/2007    - 9/20/07 mammo & u/s: ACR2-benign.  U/s = left upper outer quadrant at 1:30 4 cm from nipple demonstrates fibrous ridge; stable, no significant change since 2005; - sister has h/o breast CA        Hx Holter Monitoring wnl 4/2009    - 4/1/09 NORMAL 24 hour holter monitor interpreted by Dr. Crouch at [facility]          Hx of low back pain 3/2013    - 3/16/13 NER low back pain after shoveing snow and kick-boxing video; rxd vicodin and naproxen     Hyperbilirubinemia 2005     Mild elevation of bilrubin dating back to 2005. Total bilirubin consistently around 1.1, but normal direct bilrubin. Unclear etiology or significance. Bili wnl 2010 & 2011, 2014          Menarche 14 y/o    Menarche 14 y/o. Regular cycles q 24-26 days, heavy flow 7 days, last period 1987 (then had BARBARA for menorrhagia)     Meniere's disease 11/1/2009    h/o Meniere's Disease. Referred to [facility] by Dr. Knox 11/2009. Seen by [facility] 11/2008. Normal carotid u/s & TTE 5/2007            Menopause mid 50s y/o?    last period 1987 at 48 y/o (BARBARA for menorrhagia), then menopausal symptoms around 53 y/o     Pneumonia, community acquired 10/2007    - 10/2007: LLL Pneumonia (CAP), uncomplicated     Past Surgical History:   Procedure Laterality Date     HYSTERECTOMY       ORIF HIP FRACTURE Left 8/27/13    - 8/27/13 Dr Sonu Magana ([facility]), [facility]: ORIF (intramedullary fixation) L hip for left proximal femur fracture after  fell on her     [place] BIOPSY/EXCISION, LYMPH NODE(S) Left 7/29/03    - 7/29/03 Biopsy Left groin Lymph node; Pathology = NEGATIVE/BENIGN. Performed by [facility]. Rhodes at [facility]          [place] VAGINAL HYSTERECTOMY,UTERUS 250 GMS/<  1987    - 1987 Dr Gabriel Weiner, [facility] [place]: Vaginal hysterectomy for menorrhagia; cervix removed.          Cephalosporins; Erythromycin base; and Penicillins  Family History   Problem Relation Age of Onset     Heart disease Mother 92     mother/father CAD 92-92 y/o     Diabetes type II Mother      mother DM2     Hypertension Mother      mother htn     Heart disease Father      mother/father CAD 92-92 y/o     Hyperlipidemia Father      father high lipids, treated with Lipitor     Atrial fibrillation Father      father a fib     Thyroid disease Father      Breast cancer Sister 59     Testicular cancer Brother      Thyroid cancer Niece      Ovarian cancer Neg Hx      Social History     Social History     Marital status:  "     Spouse name: Gabriel Mondragon     Number of children: 3     Years of education: BS & MA     Occupational History     RETIRED Retired     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 [employer] x2 yrs (adhesives)     Social History Main Topics     Smoking status: Never Smoker     Smokeless tobacco: Never Used     Alcohol use No     Drug use: No     Sexual activity: Not on file     Other Topics Concern     Not on file     Social History Narrative    Notes per PCP, 3/2017:         HOME ENVIRONMENT:    - 2/2014: Lives with  \"Surinder\", dogs, house with stairs, [place]         MARITAL HISTORY:    -  to Gabriel Mondragon since __        EDUCATION:    - BS Math at [employer] ([place], [place])    - Masters Education at [employer]        HOBBIES/LEISURE ACTIVITIES:    - Reading, sewing/crafts, outdoor activities biking & swimming          PERSONAL HISTORY:    - Born & raised [place], [place]. Attended [employer] in [place], [place]. Traveled to [facility] in x4 (1996) and to [place] x2 ([phone])        Moravian:       Review of Systems  Review of Systems   Dizzy - after standing x 2      Recently had her vision checked and eyes examined  All other questions negative    Objective:         Vitals:    03/22/17 1116 03/22/17 1123   BP: 142/70 130/70   Pulse: 65    Resp: 14    Temp: 98.5  F (36.9  C)    TempSrc: Oral    SpO2: 98%    Weight: 169 lb 1.9 oz (76.7 kg)    Height: 5' 9\" (1.753 m)      Body mass index is 24.97 kg/(m^2).    Physical  Physical Exam     Head - Normal; atraumatic  Eyes- PERRL, conjunctiva without erythema or exudate.  ENT-tympanic membranes are clear bilaterally.  Mouth shows all teeth in good repair, and oropharynx is clear.  Neck-supple, no palpable mass or lymphadenopathy.  CV-regular rate and rhythm with no murmur.  Breasts: hang without dimpling or nipple retraction; palpation reveals no lumps or discharge  Respiratory-lungs clear to auscultation " with good aeration  Abdomen-soft, nontender, no palpable masses or organomegaly.  Genitourinary- deferred  Extremities-warm with no edema; +2/4 DTRs at patellae  Neurologic-strength and sensation are symmetric, movements are smoothly coordinated  Skin-no atypical appearing lesions, no rash, on her upper left shoulder near the neck there was a blueish lesion with a dark center at the opening; with pressure, two dark pieces were removed and the lesion had disappeared    Reviewed labs done last week

## 2021-06-09 NOTE — PROGRESS NOTES
[facility] Daily Progress     Patient Name: Mohamud Mondragon  Date: 2017  Visit #2  Referral Diagnosis: urinary incontinence  Referring provider: Chhaya Malone MD  Visit Diagnosis:     ICD-10-CM    1. Urinary urgency R39.15    2. Urge incontinence N39.41    3. Pelvic floor weakness N81.89    4. Osteoporosis M81.0          Assessment:     Patient demonstrates understanding/independence with home program.  Patient is appropriate to continue with skilled physical therapy intervention, as indicated by initial plan of care.    Goal Status:  Pt. will demonstrate/verbalize independence in self-management of condition in : 12 weeks  Pt. will be independent with home exercise program in : 12 weeks  Patient will void consistently every 2-3 hours : in 8-10 weeks  Patient will experience no leakage with coughing, sneezing, jumping : in 8-10 weeks  Patient will increase pelvic floor strength : to decrease urinary frequency to no more than every _ hours;to decrease episodes of urinary incontinence to _/week;in 8-10 weeks  urinary frequency decrease to no more than every __ hours: 2 hours  decrease episodes of urinary incontinence to no more than _ / week: 1x    Plan / Patient Education:     Continue with initial plan of care.  Progress with home program as tolerated.    Subjective:     Pain Ratin     Had low back pain after doing the exercises a couple of days, so she stopped. LBP is resolved. Has been trying to delay voiding with some success. Leaking about 2-3 episodes/week.       Objective:     Overuse of abdominals, gluts with PF recruitment.  Pt demonstrated a post pelvic tilt when instructed to perform kegel.   No tenderness of sup/ant pubis, obturator membrane.   Decreased ROM (L) hip.     Treatment Today     TREATMENT MINUTES COMMENTS   Evaluation     Self-care/ Home management     Manual therapy NC MFR - pelvic diaphragm   Neuromuscular Re-education     Therapeutic Activity     Therapeutic  Exercises 45 Exercises per flow sheet.  Instructed in endurance contractions w/out substitution, quick 6 contractions for urge control, roll for control, (L) bent knee fallouts,    Gait training     Modality__________________                Total 45+    Blank areas are intentional and mean the treatment did not include these items.       Reanna Ewing  4/6/2017

## 2021-06-10 NOTE — PROGRESS NOTES
[facility] Daily Progress     Patient Name: Mohamud Mondragon  Date: 2017  Visit #5  Referral Diagnosis: urinary incontinence  Referring provider: Chhaya Malone MD  Visit Diagnosis:     ICD-10-CM    1. Urinary urgency R39.15    2. Urge incontinence N39.41    3. Pelvic floor weakness N81.89    4. Osteoporosis M81.0          Assessment:     HEP/POC compliance is  good .  Patient demonstrates understanding/independence with home program.  Response to Intervention fair  Patient is benefitting from skilled physical therapy and is making steady progress toward functional goals.    Goal Status:  Pt. will demonstrate/verbalize independence in self-management of condition in : 12 weeks  Pt. will be independent with home exercise program in : 12 weeks  Patient will void consistently every 2-3 hours : in 8-10 weeks  Patient will experience no leakage with coughing, sneezing, jumping : in 8-10 weeks  Patient will increase pelvic floor strength : to decrease urinary frequency to no more than every _ hours;to decrease episodes of urinary incontinence to _/week;in 8-10 weeks  urinary frequency decrease to no more than every __ hours: 2 hours  decrease episodes of urinary incontinence to no more than _ / week: 1x    Plan / Patient Education:     Continue with initial plan of care.  Progress with home program as tolerated.  recheck in 2 weeks.     Subjective:     Pain Ratin     About 5 episodes of incontinence per week since the last session.  Tracked voiding frequency for 3 days, 6-9 times/day. Does better when she's out of the house. Tries to delay voiding if it's been only 60-90 min. She can sometimes make it to 2 hours, better after completing her morning routine of exercise, breakfast etc.    She is compliant with exercises, usually 3x/day.       Objective:     PF recruitment is poor in SL, fair in supine w/BFB.  Pt notes she has to recruit harder than she's been doing at home.     Treatment Today      TREATMENT MINUTES COMMENTS   Evaluation     Self-care/ Home management     Manual therapy     Neuromuscular Re-education     Therapeutic Activity     Therapeutic Exercises 40 Exercises per flow sheet.   PF exercises in SL/supine w/BFB.  Discussed awareness of home environment triggers, doing some management techniques before entering the house.    Gait training     Modality__________________                Total 40    Blank areas are intentional and mean the treatment did not include these items.       Reanna Ewing  5/9/2017

## 2021-06-10 NOTE — PROGRESS NOTES
[facility] Daily Progress     Patient Name: Mohamud Mondragon  Date: 2017  Visit #3  Referral Diagnosis: urinary incontinence  Referring provider: Chhaya Malone MD  Visit Diagnosis:     ICD-10-CM    1. Urinary urgency R39.15    2. Urge incontinence N39.41    3. Pelvic floor weakness N81.89    4. Osteoporosis M81.0          Assessment:     HEP/POC compliance is  fair .  Patient is benefitting from skilled physical therapy and is making steady progress toward functional goals.  Patient is appropriate to continue with skilled physical therapy intervention, as indicated by initial plan of care.    Goal Status:  Pt. will demonstrate/verbalize independence in self-management of condition in : 12 weeks  Pt. will be independent with home exercise program in : 12 weeks  Patient will void consistently every 2-3 hours : in 8-10 weeks  Patient will experience no leakage with coughing, sneezing, jumping : in 8-10 weeks  Patient will increase pelvic floor strength : to decrease urinary frequency to no more than every _ hours;to decrease episodes of urinary incontinence to _/week;in 8-10 weeks  urinary frequency decrease to no more than every __ hours: 2 hours  decrease episodes of urinary incontinence to no more than _ / week: 1x    Plan / Patient Education:     Continue with initial plan of care.  Progress with home program as tolerated.  follow up in 2 weeks.     Subjective:     Pain Ratin     It's been going okay. Has been working on the exercises more. Feels like she is catching on to the exercises more. 5 episodes of leakage since the last session.  Is trying to do the quick contractions, but reports having strong urge when rising to standing and has to run to the bathroom.       Objective:     Pt requires ongoing education re:urge and home program compliance and progression.   She verbalized understanding about urge control concepts.    Treatment Today     TREATMENT MINUTES COMMENTS   Evaluation      Self-care/ Home management 20 Discussed voiding schedule/strategy with 2 hrs being a longer range goal. Short term goal is to delay voiding by 5-10 min at a time to tolerance.  Discussed how responding to urge by running to restroom reinforces urge pattern rather than correcting it.   Discussed doing a bladder diary for a couple days to identify patterns.    Manual therapy     Neuromuscular Re-education     Therapeutic Activity     Therapeutic Exercises 25 Exercises per flow sheet.  Added ball/band to roll for control, variation of SL positioning for endurance contractions, continued education on urge control strategies and goals   Gait training     Modality__________________                Total 45    Blank areas are intentional and mean the treatment did not include these items.       Reanna Ewing  4/11/2017

## 2021-06-10 NOTE — PROGRESS NOTES
[facility] Daily Progress     Patient Name: Mohamud Mondragon  Date: 2017  Visit #4  Referral Diagnosis: urinary incontinence  Referring provider: Chhaya Malone MD  Visit Diagnosis:     ICD-10-CM    1. Urinary urgency R39.15    2. Urge incontinence N39.41    3. Pelvic floor weakness N81.89    4. Osteoporosis M81.0          Assessment:     Patient demonstrates understanding/independence with home program.  Response to Intervention good  Patient is benefitting from skilled physical therapy and is making steady progress toward functional goals.    Goal Status:  Pt. will demonstrate/verbalize independence in self-management of condition in : 12 weeks  Pt. will be independent with home exercise program in : 12 weeks  Patient will void consistently every 2-3 hours : in 8-10 weeks  Patient will experience no leakage with coughing, sneezing, jumping : in 8-10 weeks  Patient will increase pelvic floor strength : to decrease urinary frequency to no more than every _ hours;to decrease episodes of urinary incontinence to _/week;in 8-10 weeks  urinary frequency decrease to no more than every __ hours: 2 hours  decrease episodes of urinary incontinence to no more than _ / week: 1x    Plan / Patient Education:     Continue with initial plan of care.  Progress with home program as tolerated.   Follow up in 2 weeks.     Subjective:     Pain Ratin  Returns after 2 weeks. Has been out of town. Has been semi-compliant with exercises.  More regular with the exercises since the weekend.  No leakage for the past week.  2 episodes the previous week.  Able to delay voiding more effectively, but still thinks she's going too often. She thinks she is sometimes able to wait 2 hours.  Wakes 2x/night, but not to void.       Objective:     Moderate overuse of abdominals with SL endurance contractions.   No tenderness of sup/lat/ant pubis, 11/12th ribs.    Treatment Today     TREATMENT MINUTES COMMENTS   Evaluation      Self-care/ Home management     Manual therapy 15 Induction, indirect, direct techniques utilized as appropriate for optimal tissue release.   MFR/SCS - Pelvic diaphragm, bladder motility   Neuromuscular Re-education     Therapeutic Activity     Therapeutic Exercises 30 Discussed recording voiding frequency every couple weeks for comparison.  Exercises per flow sheet.   BFB not available/out for repairs   Gait training     Modality__________________                Total 45    Blank areas are intentional and mean the treatment did not include these items.       Reanna Ewing  4/26/2017

## 2021-06-10 NOTE — PROGRESS NOTES
[facility] Daily Progress     Patient Name: Mohamud Mondragon  Date: 2017  Visit #6  Referral Diagnosis: urinary incontinence  Referring provider: Chhaya Malone MD  Visit Diagnosis:     ICD-10-CM    1. Urinary urgency R39.15    2. Urge incontinence N39.41    3. Pelvic floor weakness N81.89    4. Osteoporosis M81.0          Assessment:     Response to Intervention fair  Patient is appropriate to continue with skilled physical therapy intervention, as indicated by initial plan of care.    Goal Status:  Pt. will demonstrate/verbalize independence in self-management of condition in : 12 weeks;Progressing toward  Pt. will be independent with home exercise program in : 12 weeks;Progressing toward  Patient will void consistently every 2-3 hours : in 8-10 weeks;Progressing toward  Patient will experience no leakage with coughing, sneezing, jumping : in 8-10 weeks;Progressing toward  Patient will increase pelvic floor strength : to decrease urinary frequency to no more than every _ hours;to decrease episodes of urinary incontinence to _/week;in 8-10 weeks;Progressing toward  urinary frequency decrease to no more than every __ hours: 2 hours  decrease episodes of urinary incontinence to no more than _ / week: 1x  status: 1-2x/week    Plan / Patient Education:     Continue with initial plan of care.  Progress with home program as tolerated.  Recheck in 2 weeks.     Subjective:     Pain Ratin  Has been doing the exercises 2-3x/day for the last week or so.  3 episodes of incontinence in the last 2 weeks. Hasn't been focused on 2 hour voiding schedule.  She is sometimes conscious of delaying voiding if she thinks it's too frequent. Is sleeping through the night without needing to void now.       Objective:     Overuse of abds, gluts with patient demonstrating post pelvic tilt with PF exercises.   PF recruitment, awareness and coordination remains poor with BFB.    Treatment Today     TREATMENT MINUTES  COMMENTS   Evaluation     Self-care/ Home management     Manual therapy     Neuromuscular Re-education     Therapeutic Activity     Therapeutic Exercises 40 Exercises per flow sheet.   PF exercises w/BFB. Cues to decrease abd/glut recruitment.  Reviewed 2 hour voiding schedule goal.    Gait training     Modality__________________                Total 40    Blank areas are intentional and mean the treatment did not include these items.       Reanna Ewing  5/23/2017

## 2021-06-10 NOTE — TELEPHONE ENCOUNTER
Attempt to reach patient and reschedule visit to a later date. There was no answer, LVM indication to return call to clinic.

## 2021-06-11 NOTE — PROGRESS NOTES
"[facility] Daily Progress     Patient Name: Mohamud Mondragon  Date: 2017  Visit #7  Referral Diagnosis: urinary incontinence  Referring provider: Chhaya Malone MD  Visit Diagnosis:     ICD-10-CM    1. Urinary urgency R39.15    2. Urge incontinence N39.41    3. Pelvic floor weakness N81.89    4. Osteoporosis M81.0          Assessment:     HEP/POC compliance is  good .  Patient is benefitting from skilled physical therapy and is making steady progress toward functional goals.  Patient is appropriate to continue with skilled physical therapy intervention, as indicated by initial plan of care.  Patient is making good progress towards goals.     Goal Status:  Pt. will demonstrate/verbalize independence in self-management of condition in : 12 weeks;Partially Met  Pt. will be independent with home exercise program in : 12 weeks;Partially met  Patient will void consistently every 2-3 hours : in 8-10 weeks;Met  Patient will experience no leakage with coughing, sneezing, jumping : in 8-10 weeks;Partially met  Patient will increase pelvic floor strength : to decrease urinary frequency to no more than every _ hours;to decrease episodes of urinary incontinence to _/week;in 8-10 weeks;Partially met  urinary frequency decrease to no more than every __ hours: 2 hours  status: met  decrease episodes of urinary incontinence to no more than _ / week: 1x  status: 1x/week    Plan / Patient Education:     Continue with initial plan of care.  Progress with home program as tolerated.   Follow up in 3 weeks.  Patient will have family visiting this month.     Subjective:     Pain Ratin      hasn't been doing well and is dealing with some health issues.  Kept track of some things over the last 2 weeks. Voiding about 7x/day. Delaying voiding 2x/day, 9 episodes of urgency, 2 episodes of incontinence since the last session. 6 episodes of incomplete bladder emptying with \"2nd stream\". Not waking due to urgency. Has " been compliant with exercises, doing 3 sets/day.   She is motivated to continue to improve, expresses concerns about compliance when not doing PT.       Objective:     PF recruitment, awareness and coordination is fair to poor with BFB.    Treatment Today     TREATMENT MINUTES COMMENTS   Evaluation     Self-care/ Home management     Manual therapy     Neuromuscular Re-education     Therapeutic Activity     Therapeutic Exercises 45 Exercises per flow sheet.   PF exercises w/BFB.  Discussed strategies for exercise compliance including incorporating it into some of her established exercise routine. Discussed strength and urge control as being dual components of bladder control.  Patient seems to be making functional progress towards goals.    Gait training     Modality__________________                Total 45    Blank areas are intentional and mean the treatment did not include these items.       eRanna Ewing  6/6/2017

## 2021-06-11 NOTE — PROGRESS NOTES
[facility] Re-Certification Request    June 27, 2017      Patient: Mohamud Mondragon  MR Number: [id number]  YOB: 1950  Date of Visit: 6/27/2017      Dear Dr. Malone    As you may recall, we have been seeing Mohamud Mondragon for Physical Therapy of urinary incontinence.    For therapy to continue, Medicare and/or Medicaid requires periodic physician review of the treatment plan. Please review the summary of the patient's progress and our plan for continued therapy, and verify  that you agree therapy should continue by entering certification dates at the bottom of this note and co-signing this note.    Plan of Care  Authorization / Certification Start Date: 06/21/17  Authorization / Certification End Date: 09/19/17  Authorization / Certification Number of Visits: medicare  Communication with: Referral Source  Patient Related Instruction: Nature of Condition;Treatment plan and rationale;Self Care instruction;Basis of treatment;Next steps  Times per Week: 1  Number of Visits: up to 12  Discharge Planning: home program, self management  Therapeutic Exercise: Strengthening;Pelvic Floor retraining  Neuromuscular Reeducation: core  Manual Therapy: strain counterstrain;myofascial release    Goal  Pt. will demonstrate/verbalize independence in self-management of condition in : 12 weeks;Partially Met  Pt. will be independent with home exercise program in : 12 weeks;Partially met  Patient will void consistently every 2-3 hours : in 8-10 weeks;Met  Patient will experience no leakage with coughing, sneezing, jumping : in 8-10 weeks;Partially met  Patient will increase pelvic floor strength : to decrease urinary frequency to no more than every _ hours;to decrease episodes of urinary incontinence to _/week;in 8-10 weeks;Partially met  urinary frequency decrease to no more than every __ hours: 2 hours  status: met  decrease episodes of urinary incontinence to no more than _ / week: 1x  status: 1x/week      If you  have any questions or concerns, please don't hesitate to call.    Sincerely,      Reanna Ewing, PT        Physician recommendation:     ___ Follow therapist's recommendation        ___ Modify therapy      *Physician co-signature indicates they certify the need for these services furnished within this plan and while under their care.        [facility] Daily Progress     Patient Name: Mohamud Mondragon  Date: 2017  Visit #8  Referral Diagnosis: urinary incontinence  Referring provider: Chhaya Malone MD  Visit Diagnosis:     ICD-10-CM    1. Urinary urgency R39.15    2. Urge incontinence N39.41    3. Pelvic floor weakness N81.89    4. Osteoporosis M81.0          Assessment:     HEP/POC compliance is  good .  Patient demonstrates understanding/independence with home program.  Response to Intervention good  Patient is benefitting from skilled physical therapy and is making steady progress toward functional goals.    Goal Status:  Pt. will demonstrate/verbalize independence in self-management of condition in : 12 weeks;Partially Met  Pt. will be independent with home exercise program in : 12 weeks;Partially met  Patient will void consistently every 2-3 hours : in 8-10 weeks;Met  Patient will experience no leakage with coughing, sneezing, jumping : in 8-10 weeks;Partially met  Patient will increase pelvic floor strength : to decrease urinary frequency to no more than every _ hours;to decrease episodes of urinary incontinence to _/week;in 8-10 weeks;Partially met  urinary frequency decrease to no more than every __ hours: 2 hours  status: met  decrease episodes of urinary incontinence to no more than _ / week: 1x  status: 1x/week    Plan / Patient Education:     Continue with initial plan of care.  Progress with home program as tolerated. /   Patient will be out of town for part of July.  Will follow up in about a month.     Subjective:     Pain Ratin  Returns after 3 weeks. Had family visiting.    Reports she's averaging 7x/day of voiding, doing exercises 3x/day, 3 episodes of leakage since the last session. Not waking to void at night, but hasn't slept well for other reasons the last few nights. Having an easier time emptying bladder, not having 2nd stream as much and notes the amount is very small. Not noticing as much urge.       Objective:     PF recruitment, endurance is fair to poor with BFB.  Patient report of symptoms and bladder habits at home indicate functional improvement with improved bladder regularity, decreased urge and decreased frequency of incontinence.    Treatment Today     TREATMENT MINUTES COMMENTS   Evaluation     Self-care/ Home management     Manual therapy     Neuromuscular Re-education     Therapeutic Activity     Therapeutic Exercises 45 Reviewed bladder hx over the last 3 weeks, reviewed goals. Educated patient on normal residual amounts in the bladder post voiding.   Exercises per flow sheet.   PF exercises w/BFB.   Gait training     Modality__________________                Total 45    Blank areas are intentional and mean the treatment did not include these items.       Reanna Ewing  6/27/2017

## 2021-06-12 NOTE — PROGRESS NOTES
[facility] Daily Progress / Discharge Summary    Patient Name: Mohamud Mondragon  Date: 8/22/2017  Visit #10  Referral Diagnosis: urinary incontinence  Referring provider: Chhaya Malone MD  Visit Diagnosis:     ICD-10-CM    1. Urinary urgency R39.15    2. Urge incontinence N39.41    3. Pelvic floor weakness N81.89    4. Osteoporosis M81.0          Assessment:   HEP/POC compliance is  fair to good.  Response to Intervention good  Goals have been met.    Patient understands self management strategies.     Goal Status:  Pt. will demonstrate/verbalize independence in self-management of condition in : 12 weeks;Met  Pt. will be independent with home exercise program in : 12 weeks;Met  Patient will void consistently every 2-3 hours : in 8-10 weeks;Met  Patient will experience no leakage with coughing, sneezing, jumping : in 8-10 weeks;Met  Patient will increase pelvic floor strength : to decrease urinary frequency to no more than every _ hours;to decrease episodes of urinary incontinence to _/week;in 8-10 weeks;Met  urinary frequency decrease to no more than every __ hours: 2 hours  status: met  decrease episodes of urinary incontinence to no more than _ / week: 1x  status: 2x/month/goal met    Plan / Patient Education:     Initial plan of care has been completed. Patient has responded appropriately to skilled PT intervention.  The patient met goals and has demonstrated understanding of/independence in the home program for self-care and progression to next steps.  No further therapy is required at this time.    Subjective:     Pain Rating:   Has been pretty good. Semi compliant with home program, not doing them every day. Has had a couple episodes since the last session, both involving urge.  Needed to remind herself how to manage urge issues.   Not waking to void. Has been waking due to hot flashes.     Objective:     PF recruitment is fair to good with biofeedback in SL/supine hook lying positioning.   Endurance is fair.  PF strength and recruitment have improved significantly since the start of care.     Treatment Today  TREATMENT MINUTES COMMENTS   Evaluation     Self-care/ Home management     Manual therapy     Neuromuscular Re-education     Therapeutic Activity     Therapeutic Exercises 40 Reassessed goals, functional status, objective measures.  Discussed exercise program maintenance frequency of 3-4x/week, progressing endurance contractions to sitting., using endurance contractions for strengthening, quick contractions for urge control.  Exercises per flow sheet.   PF exercises w/BFB.   Gait training     Modality__________________                Total 40    Blank areas are intentional and mean the treatment did not include these items.     Reanna Ewing  8/22/2017

## 2021-06-12 NOTE — TELEPHONE ENCOUNTER
FYI - Status Update  Who is Calling: Patient  Update: Patient was checking the status on if Chhaya Malone MD sent in the Rx for the amitriptyline. Patient was informed her Rx was approved and sent in yesterday.  Okay to leave a detailed message?:  No return call needed

## 2021-06-12 NOTE — PATIENT INSTRUCTIONS - HE
Patient Education   Urinary Incontinence, Female (Adult)  Urinary incontinence means loss of control of the bladder. This problem affects many women, especially as they get older. If you have incontinence, you may be embarrassed to ask for help. But know that this problem can be treated.  Types of Incontinence  There are different types of incontinence. Two of the main types are described here. You can have more than one type.    Stress incontinence. With this type, urine leaks when pressure (stress) is put on the bladder. This may happen when you cough, sneeze, or laugh. Stress incontinence most often occurs because the pelvic floor muscles that support the bladder and urethra are weak. This can happen after pregnancy and vaginal childbirth or a hysterectomy. It can also be due to excess body weight or hormone changes.    Urge incontinence (also called overactive bladder). With this type, a sudden urge to urinate is felt often. This may happen even though there may not be much urine in the bladder. The need to urinate often during the night is common. Urge incontinence most often occurs because of bladder spasms. This may be due to bladder irritation or infection. Damage to bladder nerves or pelvic muscles, constipation, and certain medicines can also lead to urge incontinence.  Treatment of urinary incontinence depends on the cause. Further evaluation is needed to find the type you have. This will likely include an exam and certain tests. Based on the results, you and your healthcare provider can then plan treatment. Until a diagnosis is made, the home care tips below can help relieve symptoms.  Home care    Do pelvic floor muscle exercises, if they are prescribed. The pelvic floor muscles help support the bladder and urethra. Many women find that their symptoms improve when doing special exercises that strengthen these muscles. To do the exercises contract the muscles you would use to stop your stream of urine,  but do this when you re not urinating. Hold for 10 seconds, then relax. Repeat 10 to 20 times in a row, at least 3 times a day. Your provider may give you other instructions for how to do the exercises and how often.    Keep a bladder diary. This helps track how often and how much you urinate over a set period of time. Bring this diary with you to your next visit with the provider. The information can help your provider learn more about your bladder problem.    Lose weight, if advised to by your provider. Excess weight puts pressure on the bladder. Your provider can help you create a weight-loss plan that s right for you. This may include exercising more and making certain diet changes.    Don't consume foods and drinks that may irritate the bladder. These can include alcohol and caffeinated drinks.    Quit smoking. Smoking and other tobacco use can lead to chronic cough that strains the pelvic floor muscles. Smoking may also damage the bladder and urethra. Talk with your provider about treatments or methods you can use to quit smoking.    If drinking large amounts of fluid causes you to have symptoms, you may be advised to limit your fluid intake. You may also be advised to drink most of your fluids during the day and to limit fluids at night.    If you re worried about urine leakage or accidents, you may wear absorbent pads to catch urine. Change the pads often. This helps reduce discomfort. It may also reduce the risk of skin or bladder infections.  Follow-up care  Follow up with your healthcare provider, or as directed. It may take some to find the right treatment for your problem. Your treatment plan may include special therapies or medicines. Certain procedures or surgery may also be options. Be sure to discuss any questions you have with your provider.  When to seek medical advice  Call the healthcare provider right away if any of these occur:    Fever of 100.4 F (38 C) or higher, or as directed by your  provider    Bladder pain or fullness    Abdominal swelling    Nausea or vomiting    Back pain    Weakness, dizziness or fainting  Date Last Reviewed: 10/1/2017    [id number] [employer]. [address] [place] [address], [place] [address]. All rights reserved. This information is not intended as a substitute for professional medical care. Always follow your healthcare professional's instructions.     Patient Education   Personalized Prevention Plan  You are due for the preventive services outlined below.  Your care team is available to assist you in scheduling these services.  If you have already completed any of these items, please share that information with your care team to update in your medical record.  Health Maintenance   Topic Date Due     Pneumococcal Vaccine: 65+ Years (2 of 2 - PPSV23) 01/23/2018     DXA SCAN  05/17/2021     MAMMOGRAM  05/30/2021     MEDICARE ANNUAL WELLNESS VISIT  10/20/2021     FALL RISK ASSESSMENT  10/20/2021     ADVANCE CARE PLANNING  04/19/2024     LIPID  08/03/2025     COLORECTAL CANCER SCREENING  06/03/2026     TD 18+ HE  06/19/2026     HEPATITIS C SCREENING  Completed     INFLUENZA VACCINE RULE BASED  Completed     ZOSTER VACCINES  Completed     Pneumococcal Vaccine: Pediatrics (0 to 5 Years) and At-Risk Patients (6 to 64 Years)  Aged Out     HEPATITIS B VACCINES  Aged Out

## 2021-06-12 NOTE — PROGRESS NOTES
[facility] Daily Progress     Patient Name: Mohamud Mondragon  Date: 2017  Visit #9  Referral Diagnosis: urinary incontinence  Referring provider: Chhaya Malone MD  Visit Diagnosis:     ICD-10-CM    1. Urinary urgency R39.15    2. Urge incontinence N39.41    3. Pelvic floor weakness N81.89    4. Osteoporosis M81.0          Assessment:     HEP/POC compliance is  good .  Patient demonstrates understanding/independence with home program.  Response to Intervention good  Patient is benefitting from skilled physical therapy and is making steady progress toward functional goals.    Goal Status:  Pt. will demonstrate/verbalize independence in self-management of condition in : 12 weeks;Partially Met  Pt. will be independent with home exercise program in : 12 weeks;Partially met  Patient will void consistently every 2-3 hours : in 8-10 weeks;Met  Patient will experience no leakage with coughing, sneezing, jumping : in 8-10 weeks;Partially met  Patient will increase pelvic floor strength : to decrease urinary frequency to no more than every _ hours;to decrease episodes of urinary incontinence to _/week;in 8-10 weeks;Met  urinary frequency decrease to no more than every __ hours: 2 hours  status: met  decrease episodes of urinary incontinence to no more than _ / week: 1x  status: 2x/month/goal met    Plan / Patient Education:     Continue with initial plan of care.  Progress with home program as tolerated.  1 more visit, follow up in 3 weeks.     Subjective:     Pain Ratin  Returns after about a month. Continues to work on exercises.  Stopped doing roll for control ex due to knee pain. Doing about 40 reps of PF exercises each day. [place] sharing a bathroom with 4 people. 2 episodes of leakage since the last session. One occurred at night when she woke up. Not waking at night to void.  Daytime pattern has been variable due to travel etc. Not having a 2nd stream very often,  maybe 10% of the time and only in the morning. Urge control is improved overall.   Overall, she reports significant improvement since the start of care.        Objective:     Pf recruitment is improving, fair in SL positioning.    Treatment Today   20 min late  TREATMENT MINUTES COMMENTS   Evaluation     Self-care/ Home management     Manual therapy     Neuromuscular Re-education     Therapeutic Activity     Therapeutic Exercises 40 Reviewed bladder hx over the last 4 weeks, reviewed goals, progress to date. Exercises per flow sheet.   PF exercises w/BFB.   Gait training     Modality__________________                Total 40    Blank areas are intentional and mean the treatment did not include these items.       Reanna Ewing  7/28/2017

## 2021-06-12 NOTE — PROGRESS NOTES
Assessment and Plan:     Patient has been advised of split billing requirements and indicates understanding: Yes  1. Routine general medical examination at a health care facility  No major new problems  - Urinalysis-UC if Indicated    2. Need for immunization against influenza  given  - Influenza,Quad,High Dose,PF 65 YR+    3. Bilateral primary osteoarthritis of knee  Managed well    4. Polyarthralgia  Managed, mainly a problem in one finger right now    5. Atrophic Vaginitis, postmenopausal  Uses estrace    6. Age-related osteoporosis without current pathological fracture  On calcium, vit D and exercising    7. Rob K's    8. Insomnia -  Try amitriptyline 10mg, but take it 1-2 hrs before going to bed.      The patient's current medical problems were reviewed.    I have had an Advance Directives discussion with the patient.  The following are part of a depression follow up plan for the patient:  under care of mental health counselor, coping support assessment and coping support management  The following health maintenance schedule was reviewed with the patient and provided in printed form in the after visit summary:   Health Maintenance   Topic Date Due     Pneumococcal Vaccine: 65+ Years (2 of 2 - PPSV23) 01/23/2018     DXA SCAN  05/17/2021     MAMMOGRAM  05/30/2021     MEDICARE ANNUAL WELLNESS VISIT  10/20/2021     FALL RISK ASSESSMENT  10/20/2021     ADVANCE CARE PLANNING  04/19/2024     LIPID  08/03/2025     COLORECTAL CANCER SCREENING  06/03/2026     TD 18+ HE  06/19/2026     HEPATITIS C SCREENING  Completed     INFLUENZA VACCINE RULE BASED  Completed     ZOSTER VACCINES  Completed     Pneumococcal Vaccine: Pediatrics (0 to 5 Years) and At-Risk Patients (6 to 64 Years)  Aged Out     HEPATITIS B VACCINES  Aged Out        Subjective:   Chief Complaint: Mohamud Mondragon is an 70 y.o. female here for an Annual Wellness visit.   HPI:  Doing OK given all the stress of Covid and such. There are some extended family  stressors, too.    Review of Systems:  New seb.keratoses on chest.  Please see above.  The rest of the review of systems are negative for all systems.    Patient Care Team:  Chhaya Malone MD as PCP - General (Family Medicine)  Chhaya Malone MD as Assigned PCP     Patient Active Problem List   Diagnosis     Cataract     Sprain Of Right Sternoclavicular Ligament     Actinic keratosis     Insomnia     Menopause Has Occurred     Retinal Detachment     Localized Primary Osteoarthritis Of The Right Foot 1st MTP Joint     Intrinsic eczema     Osteoporosis     Foot pain     Atrophic Vaginitis, postmenopausal     Hx of major depression     History of being hospitalized     Urinary incontinence     Polyarthralgia     Bilateral primary osteoarthritis of knee     Past Medical History:   Diagnosis Date     Allergy to dogs     - 1/2013: no allergy symptoms and not using antihistamines     CAROTID U/S-doppler wnl 5/2007    NORMAL carotid u/s 5/4/07 at [facility]. Done because of symptoms of vertigo/dizziness     Chickenpox childhood    h/o chickenpox in childhood per patient report     Closed fracture of head of left radius 8/2013    - 8/26/13:  fell on her; L radial head fracture; Dr Engel of [facility] recommended conservative management (also had left hip fracture s/p surgical repair by Dr Magana of [facility])     Closed fracture of unspecified part of neck of femur 8/2013    - 8/26/13 L hip fracture (L proximal femur),  fell on her (also , L radial fracture, conservatively managed); - 8/27/13 Dr Sonu Magana ([facility]), [facility]: ORIF (intramedullary fixation) L hip     ECHOCARDIOGRAM (TTE) wnl 5/2007    - 5/4/07 TTE: NORMAL, EF60% (done because of vertigo/dizzy symptoms)        H/O colonoscopy 06/2016    normal, repeat in 10 years = 2026     History of hepatitis A vaccination     Completed Hep A series per patient report documented in Dr. Carvalho's notes 8/12/05; patient declines testing  for immunity 2/13/10//sds     History of hepatitis B vaccination     Completed Hep B series per patient report documented in Dr. Carvalho's notes 8/12/05; patient declines testing for immunity 2/13/10//sds     hx Breast Lump LEFT, BENIGN 9/2007    - 9/20/07 mammo & u/s: ACR2-benign.  U/s = left upper outer quadrant at 1:30 4 cm from nipple demonstrates fibrous ridge; stable, no significant change since 2005; - sister has h/o breast CA        Hx Holter Monitoring wnl 4/2009    - 4/1/09 NORMAL 24 hour holter monitor interpreted by Dr. Crouch at [place]          Hx of low back pain 3/2013    - 3/16/13 NER low back pain after shoveing snow and kick-boxing video; rxd vicodin and naproxen     Hyperbilirubinemia 2005    Mild elevation of bilrubin dating back to 2005. Total bili around 1.1. Unclear etiology. 2016&17=1.6ish          Menarche 14 y/o    Menarche 14 y/o. Regular cycles q 24-26 days, heavy flow 7 days, last period 1987 (then had BARBARA for menorrhagia)     Meniere's disease 11/1/2009    h/o Meniere's Disease. Referred to [facility] by Dr. Knox 11/2009. Seen by [facility] 11/2008. Normal carotid u/s & TTE 5/2007            Menopause mid 50s y/o?    last period 1987 at 48 y/o (BARBARA for menorrhagia), then menopausal symptoms around 55 y/o     Osteopenia 2019    no bone density change from 2016-19.     Pneumonia, community acquired 10/2007    - 10/2007: LLL Pneumonia (CAP), uncomplicated     Rash 03/2019    biopsied by derm - who said could be lupus, but labs did not correlate.      Past Surgical History:   Procedure Laterality Date     HYSTERECTOMY       ORIF HIP FRACTURE Left 8/27/13    - 8/27/13 Dr Sonu Magana ([facility]: ORIF (intramedullary fixation) L hip for left proximal femur fracture after  fell on her     KY BIOPSY/EXCISION, LYMPH NODE(S) Left 7/29/03    -  7/29/03 Biopsy Left groin Lymph node; Pathology = NEGATIVE/BENIGN. Performed by Dr. Rhodes at [facility] VAGINAL HYSTERECTOMY,UTERUS 250 GMS/<  1987    [facility] [place]: Vaginal hysterectomy for menorrhagia; cervix removed.           Family History   Problem Relation Age of Onset     Heart disease Mother 92        mother/father CAD 92-92 y/o     Diabetes type II Mother         mother DM2     Hypertension Mother         mother htn     Heart disease Father 94        mother/father CAD 92-92 y/o     Hyperlipidemia Father         father high lipids, treated with Lipitor     Atrial fibrillation Father         father a fib     Thyroid disease Father      Breast cancer Sister 59        cured!! 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 [employer] x2 yrs (adhesives)   Social Needs     Financial resource strain: Not very hard     Food insecurity     Worry: Never true     Inability: Never true     Transportation needs     Medical: No     Non-medical: No   Tobacco Use     Smoking status: Never Smoker     Smokeless tobacco: Never Used     Tobacco comment: No passive smoke exposure   Substance and Sexual Activity     Alcohol use: No     Drug use: No     Sexual activity: Yes     Partners: Male     Birth control/protection: Post-menopausal     Comment: 1 partner   Lifestyle     Physical activity     Days per week: 4 days     Minutes per  "session: 30 min     Stress: Rather much   Relationships     Social connections     Talks on phone: Not on file     Gets together: Not on file     Attends [employer] service: More than 4 times per year     Active member of club or organization: Not on file     Attends meetings of clubs or organizations: More than 4 times per year     Relationship status:      Intimate partner violence     Fear of current or ex partner: No     Emotionally abused: No     Physically abused: No     Forced sexual activity: No   Other Topics Concern     Not on file   Social History Narrative    Notes per PCP, 12/2018        HOME ENVIRONMENT:    - Lives with  \"Surinder\", dogs, house with stairs, [place]         MARITAL HISTORY:    -  to Gabriel Mondragon since __        EDUCATION:    - BS Math at [employer] ([place], [place])    - Masters Education at [employer]        HOBBIES/LEISURE ACTIVITIES:    - Reading, sewing/crafts, outdoor activities biking & swimming          PERSONAL HISTORY:    - Born & raised [place], [place]. Attended [employer] in [place], [place]. Travel[place] to [place]4 [place]1996) and to [place] x2 ([phone])        Amish: Confucianism      Current Outpatient Medications   Medication Sig Dispense Refill     biotin-silicon diox-L-cysteine 5,000 mcg-100 mg- 50 mg Tab Take 1 tablet by mouth daily.       calcium carbonate-vitamin D3 600 mg(1,500mg) -200 unit per tablet Take 1 tablet by mouth 2 (two) times a day.       carboxymethylcellulose (REFRESH PLUS) 0.5 % Dpet ophthalmic dropperette        cetirizine (ZYRTEC) 10 MG tablet Take 10 mg by mouth daily.       cholecalciferol, vitamin D3, 2,000 unit Tab Take 1 tablet (2,000 Units total) by mouth daily.       estradiol (ESTRACE) 0.01 % (0.1 mg/gram) vaginal cream Insert 2 g into the vagina as needed. INSERT 1 APPLICATOR 1 TO 2 TIMES WEEKLY AS NEEDED FOR SYMPTOMS OF POSTMENOPAUSAL ATROPHIC VAGINITIS. 127.5 g 4     melatonin 5 mg Tab Take 1 tablet by mouth bedtime.       " "multivitamin therapeutic (THERAGRAN) tablet Take 1 tablet by mouth daily.       omega-3 acid ethyl esters (LOVAZA) 1 gram capsule Take 2 g by mouth 2 (two) times a day.       VIT A/VIT C/VIT E/ZINC/COPPER (PRESERVISION AREDS ORAL) Take 2 tablets by mouth.        aspirin 81 MG EC tablet Take 81 mg by mouth daily.       clobetasol (TEMOVATE) 0.05 % ointment Use this medication three times daily on rash until gone 30 g 1     No current facility-administered medications for this visit.       Objective:   Vital Signs:   Visit Vitals  /72   Pulse 78   Temp 98  F (36.7  C) (Oral)   Resp 18   Ht 5' 8.9\" (1.75 m)   Wt 158 lb (71.7 kg)   SpO2 97%   BMI 23.40 kg/m           VisionScreening:  No exam data present     PHYSICAL EXAM  Gen:NAD  Ht: reg s1s2  Lungs: clear with good aeration  Gait: even, quick, even    Assessment Results 10/20/2020   Activities of Daily Living No help needed   Instrumental Activities of Daily Living No help needed   Get Up and Go Score -   Mini Cog Total Score 5   Some recent data might be hidden     A Mini-Cog score of 0-2 suggests the possibility of dementia, score of 3-5 suggests no dementia        Identified Health Risks:     Information on urinary incontinence and treatment options given to patient.  Patient's advanced directive was discussed and I am comfortable with the patient's wishes.        "

## 2021-06-12 NOTE — TELEPHONE ENCOUNTER
This patient was on my schedule for 10/20 but then was moved (by mistake) due to a staff meeting that was incorrectly on the schedule. She would like to come in tomorrow. Please call her on her cell phone, [phone].  thanks

## 2021-06-12 NOTE — PROGRESS NOTES
OFFICE VISIT NOTE      Assessment & Plan   Mohamud Mondragon is a 67 y.o. female with several questions to answer.    Dizzy spells - happening more and not only with initially standing up. If labs are normal, will do holter. If that is fine, might do a tip table test.    Thickened skin - continue with steroid cream    Hot flashes - try to get her back on Estrace which worked better. She is quite certain the change in med is the cause of the hot flashes. I am a little worried about something sinister causing the hot flashes, but we'll see if we can get Estrace first and see if that helps. No obvious other problem to eval.  Bilirubin - previously elevated; needs rechecking but it might require another draw.    Diagnoses and all orders for this visit:    Dizzy spells  -     HM1(CBC and Differential)  -     Thyroid Stimulating Hormone (TSH)  -     Basic Metabolic Panel  -     Glycosylated Hemoglobin A1c  -     HM1 (CBC with Diff)    Total bilirubin, elevated  -     Bilirubin, Total    Other orders  -     triamcinolone (KENALOG) 0.1 % ointment; Apply daily to skin on knees  Dispense: 30 g; Refill: 1      Chhaya Malone MD              Subjective:   Chief Complaint:  Hot Flashes and Dizziness    67 y.o. female.     1) Aug 16th, 9 times of dizziness - makes her stop because she's about to lose her balance; with a stop and waiting, she's fine. Had this in 2009 and after child birth (1983).  It does occur when she stands from sleeping.   2-3 per week at the most.      6 of hot flashes.  Most hot flashes are during the night, they wake her, no sweat  Estrace - is fine  Premarin - causes/allows this    Current Outpatient Prescriptions   Medication Sig     cetirizine (ZYRTEC) 10 MG tablet Take 10 mg by mouth daily.     cholecalciferol, vitamin D3, 2,000 unit Tab Take 1 tablet (2,000 Units total) by mouth daily.     estradiol (ESTRACE) 0.01 % (0.1 mg/gram) vaginal cream Insert 1 g into the vagina daily.     biotin-silicon  "diox-L-cysteine 5,000 mcg-100 mg- 50 mg Tab Take 1 tablet by mouth daily.     calcium carbonate-vitamin D3 600 mg(1,500mg) -200 unit per tablet Take 1 tablet by mouth 2 (two) times a day.     conjugated estrogens (PREMARIN) vaginal cream Insert into the vagina daily. Insert 0.5 gram vaginally once a week     estradiol (ESTRACE) 0.01 % (0.1 mg/gram) vaginal cream Insert 1 applicator 1-2 times weekly as needed for symptoms of postmenopausal atrophic vaginitis     melatonin 5 mg Tab Take 1 tablet by mouth bedtime.     multivitamin therapeutic (THERAGRAN) tablet Take 1 tablet by mouth daily.     triamcinolone (KENALOG) 0.1 % ointment Apply daily to skin on knees     VIT A/VIT C/VIT E/ZINC/COPPER (PRESERVISION AREDS ORAL) Take by mouth.       PSFHx: appropriate sections of PMH completed/filled in   Tobacco Status:  She  reports that she has never smoked. She has never used smokeless tobacco.    Review of Systems:  No fever.  No diarrhea.    Objective:    /62 (Patient Site: Left Arm, Patient Position: Sitting, Cuff Size: Adult Regular)  Pulse 64  Temp 98.7  F (37.1  C) (Oral)   Resp 20  Ht 5' 9\" (1.753 m)  Wt 175 lb (79.4 kg)  SpO2 97%  Breastfeeding? Unknown  BMI 25.84 kg/m2  GENERAL: No acute distress.  Skin: knees have thickened skin without skin-lines over the lower patellar tendon, no erythema, mild callus; a few moles - light tan with smooth edges    Ht: reg s1s2 without murmur; no skipped beats over a couple minutes of listening  Lungs: clear with good aeration    Gait: steady    Labs pending    Greater than 25 minutes spent with patient, with greater than 50% of time spent in counseling, consultation and care coordination regarding problems detailed above.          "

## 2021-06-13 NOTE — PROGRESS NOTES
Chief Complaint   Patient presents with     Sore Throat     x 1 month with fever        HPI    Patient is here for 4 wks of moderate sore throat with one episode of subjective fever. No nasal congestion, cough, ear pain.    ROS: Pertinent ROS noted in HPI.     Allergies   Allergen Reactions     Cephalosporins Rash     Erythromycin Base Rash     Penicillins Rash       Patient Active Problem List   Diagnosis     Cataract (bilateral)     Sprain Of Right Sternoclavicular Ligament     Actinic keratosis     Insomnia     Menopause Has Occurred     Retinal Detachment     Localized Primary Osteoarthritis Of The Right Foot 1st MTP Joint     Dermatitis     Osteoporosis     Elevated CK     Foot pain     Atrophic Vaginitis, postmenopausal     Hx of major depression     History of being hospitalized     Urinary incontinence       Family History   Problem Relation Age of Onset     Heart disease Mother 92     mother/father CAD 92-92 y/o     Diabetes type II Mother      mother DM2     Hypertension Mother      mother htn     Heart disease Father      mother/father CAD 92-92 y/o     Hyperlipidemia Father      father high lipids, treated with Lipitor     Atrial fibrillation Father      father a fib     Thyroid disease Father      Breast cancer Sister 59     Testicular cancer Brother      Thyroid cancer Niece      Ovarian cancer Neg Hx        Social History     Social History     Marital status:      Spouse name: Gabriel Mondragon     Number of children: 3     Years of education: BS & MA     Occupational History     RETIRED Retired     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 HB Burkett x2 yrs (adhesives)     Social History Main Topics     Smoking status: Never Smoker     Smokeless tobacco: Never Used     Alcohol use No     Drug use: No     Sexual activity: Not on file     Other Topics Concern     Not on file     Social History Narrative    Notes per PCP, 3/2017:         HOME  "ENVIRONMENT:    - 2/2014: Lives with  \"Surinder\", dogs, house with stairs, [place]         MARITAL HISTORY:    -  to Gabriel Mondragon since __        EDUCATION:    - BS Math at [employer] ([place], [place])    - Masters Education at [employer]        HOBBIES/LEISURE ACTIVITIES:    - Reading, sewing/crafts, outdoor activities biking & swimming          PERSONAL HISTORY:    - Born & raised [place], [place]. Attended [employer] in [place], [place]. Traveled to [place] in x4 (1996) and to [place] x2 ([id number])        Hindu:         Objective:    Vitals:    11/02/17 0713   BP: 130/60   Pulse: 69   Temp: 98.1  F (36.7  C)   SpO2: 98%       Gen:NAD  Oropharynx: normal throat,oral mucosa  Ears: Normal TMs and canals  Nose: no discharge  Neck:NAD  CV:RRR, no M, R, G  Pulm: CTAB        Recent Results (from the past 24 hour(s))   Rapid Strep A Screen-Throat   Result Value Ref Range    Rapid Strep A Antigen No Group A Strep detected, presumptive negative No Group A Strep detected, presumptive negative         Throat pain  -     Rapid Strep A Screen-Throat  -     Group A Strep, RNA Direct Detection, Throat      Negative RST, pending final test. Reassuring exam Discussed etiologies, supportive cares.   "

## 2021-06-13 NOTE — PROGRESS NOTES
Chief Complaint   Patient presents with     Recheck Poison ivy     forearms.  Was seen for this on 9/19/17       HPI    Patient is here for f/u rash on forearms, and right side of abdomen, with burning sensation without significant itching. She was treated 9/19 with Bactrim (for presumed cellulitis) and Prednisone for poison IV cellulitis. Her symptoms somewhat improved but worsened again. No fever, chills.     ROS: Pertinent ROS noted in HPI.     Allergies   Allergen Reactions     Cephalosporins Rash     Erythromycin Base Rash     Penicillins Rash       Patient Active Problem List   Diagnosis     Cataract (bilateral)     Sprain Of Right Sternoclavicular Ligament     Actinic keratosis     Insomnia     Menopause Has Occurred     Retinal Detachment     Localized Primary Osteoarthritis Of The Right Foot 1st MTP Joint     Dermatitis     Osteoporosis     Elevated CK     Foot pain     Atrophic Vaginitis, postmenopausal     Hx of major depression     History of being hospitalized     Urinary incontinence       Family History   Problem Relation Age of Onset     Heart disease Mother 92     mother/father CAD 92-94 y/o     Diabetes type II Mother      mother DM2     Hypertension Mother      mother htn     Heart disease Father      mother/father CAD 92-94 y/o     Hyperlipidemia Father      father high lipids, treated with Lipitor     Atrial fibrillation Father      father a fib     Thyroid disease Father      Breast cancer Sister 59     Testicular cancer Brother      Thyroid cancer Niece      Ovarian cancer Neg Hx        Social History     Social History     Marital status:      Spouse name: Gabriel Mondragon     Number of children: 3     Years of education: BS & MA     Occupational History     RETIRED Retired     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 IRENE Burkett x2 yrs (adhesives)     Social History Main Topics     Smoking status: Never Smoker     Smokeless tobacco: Never  "Used     Alcohol use No     Drug use: No     Sexual activity: Not on file     Other Topics Concern     Not on file     Social History Narrative    Notes per PCP, 3/2017:         HOME ENVIRONMENT:    - 2/2014: Lives with  \"Surinder\", dogs, house with stairs, [place]         MARITAL HISTORY:    -  to Gabriel Mondragon since __        EDUCATION:    - BS Math at [email or web address] ([place], [place])    - Masters Education at [employer]        HOBBIES/LEISURE ACTIVITIES:    - Reading, sewing/crafts, outdoor activities biking & swimming          PERSONAL HISTORY:    - Born & raised [place], [place]. Attended [email or web address] in [place], [place]. Traveled to [place] in x4 (1996) and to [place] x2 ([id number])        Confucianism:     Objective:    Vitals:    09/24/17 1227   BP: 138/78   Pulse: 66   Resp: 16   Temp: 97.6  F (36.4  C)   SpO2: 99%         Gen:NAD  Skin: extensive erythematous eruptions on right forearm up to the elbow, with scattered areas of similar lesions on left forearm and right side of abdomen.      Poison ivy dermatitis  -     methylPREDNISolone (MEDROL DOSEPACK) 4 mg tablet; follow package directions      Also advised patient to start her Triamcinolone two times daily to affected areas. Follow up as directed.    "
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
## 2021-06-16 NOTE — TELEPHONE ENCOUNTER
Please see message below about COVID 19 vaccine side effects.  Thoughts or recommendations?  Thanks.

## 2021-06-16 NOTE — TELEPHONE ENCOUNTER
Be sure to take good care of self, with healthy eating, probably (temporarily) less exercise and more naps. Please call back tomorrow to say if things change at all

## 2021-06-16 NOTE — TELEPHONE ENCOUNTER
Patient calls in, with Caitlin TOSCANO From [facility], regarding covid-19 side effects. Patient had her second Pfizer covid-19 vaccine on Saturday. Patient reports that she developed fatigue, fever and lightheadedness after the injection. Today patient's temp is 96.3 taken orally with a new thermometer. Patient denies feeling cold or shivering. Patient also continues to feel tired and lightheaded. The lightheadedness if mild, patient is able to walk fine not having to hold on to anything. Patient is not short of breath or having any difficulty breathing. Patient denies any redness or swelling at the injection site.     Patient is advised that most some side effects can last 2-3 days. A message will be sent to her provider to review in regards to the low temp and lightheadedness.    Please review and call patient back with recommendations.    Kianna Singh RN, BSN Nurse Triage Advisor 8:25 AM 3/23/2021      Reason for Disposition    COVID-19 vaccine, systemic reactions (e.g., fatigue, fever, muscle aches), questions about    [1] MILD dizziness (e.g., walking normally) AND [2] has NOT been evaluated by physician for this  (Exception: dizziness caused by heat exposure, sudden standing, or poor fluid intake)    Additional Information    Negative: [1] Difficulty breathing or swallowing AND [2] starts within 2 hours after injection    Negative: Sounds like a life-threatening emergency to the triager    Negative: [1] COVID-19 exposure AND [2] no symptoms    Negative: [1] Typical COVID-19 symptoms AND [2] symptoms that are NOT expected from vaccine (e.g., cough, difficulty breathing, loss of taste or smell, runny nose, sore throat)    Negative: [1] Typical COVID-19 symptoms AND [2] started > 3 days after getting vaccine    Negative: Fever > 104 F (40 C)    Negative: Sounds like a severe, unusual reaction to the triager    Negative: [1] Redness or red streak around the injection site AND [2] started > 48 hours after  getting vaccine AND [3] fever    Negative: [1] Fever > 101 F (38.3 C) AND [2] age > 60 AND [3] started > 48 hours after getting vaccine    Negative: [1] Fever > 100.0 F (37.8 C) AND [2] bedridden (e.g., nursing home patient, CVA, chronic illness, recovering from surgery) AND [3] started > 48 hours after getting vaccine    Negative: [1] Fever > 100.0 F (37.8 C) AND [2] diabetes mellitus or weak immune system (e.g., HIV positive, cancer chemo, splenectomy, organ transplant, chronic steroids) AND [3] started > 48 hours after getting vaccine    Negative: [1] Redness or red streak around the injection site AND [2] started > 48 hours after getting vaccine AND [3] no fever  (Exception: red area < 1 inch or 2.5 cm wide)    Negative: [1] Pain, tenderness, or swelling at the injection site AND [2] over 3 days (72 hours) since vaccine AND [3] getting worse    Negative: Fever > 100.0 F (37.8 C) present > 3 days (72 hours)    Negative: [1] Fever > 100.0 F (37.8 C) AND [2] healthcare worker    Negative: [1] Pain, tenderness, or swelling at the injection site AND [2] lasts > 7 days    Negative: [1] Requesting COVID-19 vaccine AND [2] healthcare worker (e.g., EMS first responders, doctors, nurses)    Negative: [1] Requesting COVID-19 vaccine AND [2] resident of a long-term care facility (e.g., nursing home)    Negative: [1] Requesting COVID-19 vaccine AND [2] vaccine available in the community for this patient group    Negative: COVID-19 vaccine, injection site reaction (e.g., pain, redness, swelling), question about    Negative: Severe difficulty breathing (e.g., struggling for each breath, speaks in single words)    Negative: [1] Difficulty breathing or swallowing AND [2] started suddenly after medicine, an allergic food or bee sting    Negative: Shock suspected (e.g., cold/pale/clammy skin, too weak to stand, low BP, rapid pulse)    Negative: Difficult to awaken or acting confused (e.g., disoriented, slurred speech)    Negative:  "[1] Weakness (i.e., paralysis, loss of muscle strength) of the face, arm or leg on one side of the body AND [2] sudden onset AND [3] present now    Negative: [1] Numbness (i.e., loss of sensation) of the face, arm or leg on one side of the body AND [2] sudden onset AND [3] present now    Negative: [1] Loss of speech or garbled speech AND [2] sudden onset AND [3] present now    Negative: Overdose (accidental or intentional) of medications    Negative: [1] Fainted > 15 minutes ago AND [2] still feels too weak or dizzy to stand    Negative: Heart beating < 50 beats per minute OR > 140 beats per minute    Negative: Sounds like a life-threatening emergency to the triager    Negative: Chest pain    Negative: Rectal bleeding, bloody stool, or tarry-black stool    Negative: [1] Vomiting AND [2] contains red blood or black (\"coffee ground\") material    Negative: Vomiting is main symptom    Negative: Diarrhea is main symptom    Negative: Headache is main symptom    Negative: Patient states that he/she is having an anxiety/panic attack    Negative: Dizziness from low blood sugar (i.e., < 60 mg/dl or 3.5 mmol/l)    Negative: Dizziness is described as a spinning sensation (i.e., vertigo)    Negative: Heat exhaustion suspected (i.e., dehydration from heat exposure)    Negative: Difficulty breathing    Negative: SEVERE dizziness (e.g., unable to stand, requires support to walk, feels like passing out now)    Negative: Extra heart beats OR irregular heart beating  (i.e., \"palpitations\")    Negative: [1] Drinking very little AND [2] dehydration suspected (e.g., no urine > 12 hours, very dry mouth, very lightheaded)    Negative: Patient sounds very sick or weak to the triager    Negative: [1] Dizziness caused by heat exposure, sudden standing, or poor fluid intake AND [2] no improvement after 2 hours of rest and fluids    Negative: [1] Fever > 103 F (39.4 C) AND [2] not able to get the fever down using Fever Care Advice    Negative: " [1] Fever > 101 F (38.3 C) AND [2] age > 60    Negative: [1] Fever > 100.0 F (37.8 C) AND [2] bedridden (e.g., nursing home patient, CVA, chronic illness, recovering from surgery)    Negative: [1] Fever > 100.0 F (37.8 C) AND [2] diabetes mellitus or weak immune system (e.g., HIV positive, cancer chemo, splenectomy, organ transplant, chronic steroids)    Negative: [1] MODERATE dizziness (e.g., interferes with normal activities) AND [2] has NOT been evaluated by physician for this  (Exception: dizziness caused by heat exposure, sudden standing, or poor fluid intake)    Negative: Fever present > 3 days (72 hours)    Negative: Taking a medicine that could cause dizziness (e.g., blood pressure medications, diuretics)    Negative: [1] MODERATE dizziness (e.g., interferes with normal activities) AND [2] has been evaluated by physician for this    Protocols used: CORONAVIRUS (COVID-19) VACCINE QUESTIONS AND [id number] 1.3.21, DIZZINESS - [id number]
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
## 2021-06-17 NOTE — PROGRESS NOTES
Assessment and Plan:   69yo female with:    1. Screening mammogram, encounter for  Will refer for 3D mammo- will get info from patient to put this referral in.  - Mammo Screening Bilateral; Future    2. Routine general medical examination at a health care facility  done    3. Atrophic Vaginitis, postmenopausal  Continue vaginal estrogen    4. Age-related cataract of both eyes, unspecified age-related cataract type  Anticipate removing cataracts later this year        The patient's current medical problems were reviewed.      The following health maintenance schedule was reviewed with the patient and provided in printed form in the after visit summary:   Health Maintenance   Topic Date Due     INFLUENZA VACCINE RULE BASED (1) 08/01/2017     MAMMOGRAM  03/14/2018     DXA SCAN  04/28/2018     FALL RISK ASSESSMENT  04/18/2019     ADVANCE DIRECTIVES DISCUSSED WITH PATIENT  03/14/2021     COLONOSCOPY  06/03/2026     TD 18+ HE  06/09/2026     PNEUMOCOCCAL POLYSACCHARIDE VACCINE AGE 65 AND OVER  Completed     PNEUMOCOCCAL CONJUGATE VACCINE FOR ADULTS (PCV13 OR PREVNAR)  Completed     ZOSTER VACCINE  Completed                Subjective:   Chief Complaint: Mohamud Mondragon is an 68 y.o. female here for an Annual Wellness visit.   HPI:  Here for annual wellness visit and to catch up on a few things. She is feeling better overall about her son's failed marriage. She is loving and supporting her son through it. She continues to purchase the estrogen cream herself. She has cataracts and is thinking she'll do surgery for them this coming fall.    Review of Systems:  Occasional right front lower rib/intercostal muscle pain;  Please see above.  The rest of the review of systems are negative for all systems.    Patient Care Team:  Chhaya Rivera MD as PCP - General (Family Medicine)     Patient Active Problem List   Diagnosis     Cataract     Sprain Of Right Sternoclavicular Ligament     Actinic keratosis     Insomnia      Menopause Has Occurred     Retinal Detachment     Localized Primary Osteoarthritis Of The Right Foot 1st MTP Joint     Dermatitis     Osteoporosis     Elevated CK     Foot pain     Atrophic Vaginitis, postmenopausal     Hx of major depression     History of being hospitalized     Urinary incontinence     Past Medical History:   Diagnosis Date     Allergy to dogs     - 1/2013: no allergy symptoms and not using antihistamines     CAROTID U/S-doppler wnl 5/2007    NORMAL carotid u/s 5/4/07 at [place]. Done because of symptoms of vertigo/dizziness     Chickenpox childhood    h/o chickenpox in childhood per patient report     Closed fracture of head of left radius 8/2013    - 8/26/13:  fell on her; L radial head fracture; Dr Engel of [facility] recommended conservative management (also had left hip fracture s/p surgical repair by Dr Magana of [facility])     Closed fracture of unspecified part of neck of femur 8/2013    - 8/26/13 L hip fracture (L proximal femur),  fell on her (also , L radial fracture, conservatively managed); - 8/27/13 Dr Sonu Magana ([facility]), [facility]: ORIF (intramedullary fixation) L hip     ECHOCARDIOGRAM (TTE) wnl 5/2007    - 5/4/07 TTE: NORMAL, EF60% (done because of vertigo/dizzy symptoms)        H/O colonoscopy 06/2016    normal, repeat in 10 years = 2026     History of hepatitis A vaccination     Completed Hep A series per patient report documented in Dr. Carvalho's notes 8/12/05; patient declines testing for immunity 2/13/10//sds     History of hepatitis B vaccination     Completed Hep B series per patient report documented in Dr. Carvalho's notes 8/12/05; patient declines testing for immunity 2/13/10//sds     hx Breast Lump LEFT, BENIGN 9/2007    - 9/20/07 mammo & u/s: ACR2-benign.  U/s = left upper outer quadrant at 1:30 4 cm from nipple demonstrates fibrous ridge; stable, no significant change since 2005; - sister has h/o breast CA        Hx Holter Monitoring wnl 4/2009     - 4/1/09 NORMAL 24 hour holter monitor interpreted by Dr. Crouch at [facility] of low back pain 3/2013    - 3/16/13 NER low back pain after shoveing snow and kick-boxing video; rxd vicodin and naproxen     Hyperbilirubinemia 2005    Mild elevation of bilrubin dating back to 2005. Total bili around 1.1. Unclear etiology. 2016&17=1.6ish          Menarche 14 y/o    Menarche 14 y/o. Regular cycles q 24-26 days, heavy flow 7 days, last period 1987 (then had BARBARA for menorrhagia)     Meniere's disease 11/1/2009    h/o Meniere's Disease. Referred to [facility] by Dr. Knox 11/2009. Seen by [facility] 11/2008. Normal carotid u/s & TTE 5/2007            Menopause mid 50s y/o?    last period 1987 at 46 y/o (BARBARA for menorrhagia), then menopausal symptoms around 55 y/o     Pneumonia, community acquired 10/2007    - 10/2007: LLL Pneumonia (CAP), uncomplicated      Past Surgical History:   Procedure Laterality Date     HYSTERECTOMY       ORIF HIP FRACTURE Left 8/27/13    - 8/27/13 Dr Sonu Magana ([facility]: ORIF (intramedullary fixation) L hip for left proximal femur fracture after  fell on her     [place] BIOPSY/EXCISION, LYMPH NODE(S) Left 7/29/03    - 7/29/03 Biopsy Left groin Lymph node; Pathology = NEGATIVE/BENIGN. Performed by Dr. Rhodes at [facility]          [place] VAGINAL HYSTERECTOMY,UTERUS 250 GMS/<  1987    [facility] [place] Vaginal hysterectomy for menorrhagia; cervix removed.           Family History   Problem Relation Age of Onset     Heart disease Mother 92     mother/father CAD 92-94 y/o     Diabetes type II Mother      mother DM2     Hypertension Mother      mother htn     Heart disease Father      mother/father CAD 92-94 y/o     Hyperlipidemia Father      father high lipids, treated with Lipitor     Atrial fibrillation Father      father a fib     Thyroid disease Father      Breast cancer Sister 59     Testicular cancer  "Brother      Thyroid cancer Niece      Ovarian cancer Neg Hx       Social History     Social History     Marital status:      Spouse name: Gabriel Mondragon     Number of children: 3     Years of education: BS & MA     Occupational History     RETIRED Retired     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 [employer] x2 yrs (adhesives)     Social History Main Topics     Smoking status: Never Smoker     Smokeless tobacco: Never Used      Comment: No passive smoke exposure     Alcohol use No     Drug use: No     Sexual activity: Not on file     Other Topics Concern     Not on file     Social History Narrative    Notes per PCP, 4/2018        HOME ENVIRONMENT:    - 2/2014: Lives with  \"Surinder\", dogs, house with stairs, [employer]         MARITAL HISTORY:    -  to Gabriel Mondragon since __        EDUCATION:    - BS Math at [employer] ([place], [place])    - Masters Education at [employer]        HOBBIES/LEISURE ACTIVITIES:    - Reading, sewing/crafts, outdoor activities biking & swimming          PERSONAL HISTORY:    - Born & raised [place], [place]. Attended [employer] in [place], [place]. Travel[place]1996[place] and to [place] x2 ([id number])        Congregation: [employer]      Current Outpatient Prescriptions   Medication Sig Dispense Refill     biotin-silicon diox-L-cysteine 5,000 mcg-100 mg- 50 mg Tab Take 1 tablet by mouth daily.       calcium carbonate-vitamin D3 600 mg(1,500mg) -200 unit per tablet Take 1 tablet by mouth 2 (two) times a day.       cetirizine (ZYRTEC) 10 MG tablet Take 10 mg by mouth daily.       cholecalciferol, vitamin D3, 2,000 unit Tab Take 1 tablet (2,000 Units total) by mouth daily.       estradiol (ESTRACE) 0.01 % (0.1 mg/gram) vaginal cream Insert 2 g into the vagina as needed. INSERT 1 APPLICATOR 1 TO 2 TIMES WEEKLY AS NEEDED FOR SYMPTOMS OF POSTMENOPAUSAL ATROPHIC VAGINITIS. 127.5 g 4     melatonin 5 mg Tab Take 1 tablet by mouth " "bedtime.       multivitamin therapeutic (THERAGRAN) tablet Take 1 tablet by mouth daily.       triamcinolone (KENALOG) 0.1 % ointment Apply daily to skin on knees 30 g 3     conjugated estrogens (PREMARIN) vaginal cream INSERT 1 APPLICATOR 1 TO 2 TIMES WEEKLY AS NEEDED FOR SYMPTOMS OF POSTMENOPAUSAL ATROPHIC VAGINITIS. 127.5 g 3     VIT A/VIT C/VIT E/ZINC/COPPER (PRESERVISION AREDS ORAL) Take 2 tablets by mouth.        No current facility-administered medications for this visit.       Objective:   Vital Signs:   Visit Vitals     /64 (Patient Site: Left Arm, Patient Position: Sitting, Cuff Size: Adult Regular)     Pulse 71     Temp 97.6  F (36.4  C) (Oral)     Resp 20     Ht 5' 9\" (1.753 m)     Wt 164 lb 12 oz (74.7 kg)     SpO2 99%  Comment: RA     Breastfeeding No     BMI 24.33 kg/m2        VisionScreening:  No exam data present     PHYSICAL EXAM  Physical Exam:  General Appearance: Alert, cooperative, no distress, appears stated age  Head: Normocephalic, without obvious abnormality, atraumatic  Eyes: PERRL, EOM's intact  Nose: Nares normal, septum midline, no drainage  Throat: Lips, mucosa, and tongue without erythema or swelling  Neck: Supple, symmetrical, trachea midline  Lungs: Clear to auscultation bilaterally, respirations unlabored  Breasts: Hang without dimpling or nipple retraction, No breast masses, tenderness, asymmetry, or nipple discharge.  Heart: Regular rate and rhythm, S1 and S2 normal, no murmur  Extremities: Extremities normal, atraumatic, no cyanosis or edema  Skin: Skin color, texture, turgor normal, no rashes or lesions on areas seen (not a complete skin exam done)  Neurologic: smooth coordination      Assessment Results 4/18/2018   Activities of Daily Living No help needed   Instrumental Activities of Daily Living No help needed   Get Up and Go Score Less than 12 seconds   Mini Cog Total Score 5   Some recent data might be hidden     A Mini-Cog score of 0-2 suggests the possibility of " dementia, score of 3-5 suggests no dementia    Identified Health Risks:   Dense breasts - higher risk of breast cancer

## 2021-06-19 NOTE — LETTER
Letter by Chhaya Malone MD at      Author: Chhaya Malone MD Service: -- Author Type: --    Filed:  Encounter Date: 5/21/2019 Status: (Other)         Mohamud Mondragon  [address] [facility] [address]             May 21, 2019         Dear Ms. Mondragon,    Below are the results from your recent visit:    Resulted Orders   DXA Bone Density Scan    Narrative    5/17/2019      RE: Mohamud Mondragon  YOB: 1950        Dear Chhaya Malone,    Patient Profile:  69 y.o. female, postmenopausal, is here for the follow up bone density   test.   History of fractures - Yes;  Hip. Family history of osteoporosis - Yes;    mother.  Family history of hip fracture: Yes;  father. Smoking history -   Past. Osteoporosis treatment past -  Yes;  Bisphosphonates. Osteoporosis   treatment current - No.  Chronic medical problems - Hip replacement . High   risk medications -  Steroids;  Yes, in the Past.      Assessment:    1. The spine bone density L1-L4 with T-score -0.7.  2. Femoral bone density shows right total hip T- score -1.4.  3. Trabecular bone score indicates good trabecular bone architecture.      69 y.o. female with LOW BONE DENSITY (OSTEOPENIA) and MODERATE fracture   risk, adjusted for the TBS, with major osteoporotic fracture risk 17.6 %   and hip fracture risk 2.3 %.     Since the previous bone density dated  April 28, 2016, there has been no   statistically significant % change in the bone density of the spine.    Additionally there has been a -5.7 % change in the right total hip.    Recommendations:  Appropriate calcium, vitamin D supplements, along with balance and weight   bearing exercise recommended with follow up bone density scan in 2 years.      Bone densitometry was performed on your patient using our [employer]   densitometer. The results are summarized and a copy of the actual scans   are included for your review. In conformity with the International Society   of Clinical  Densitometry's most recent position statement for DXA   interpretation (2015), the diagnosis will be made on the lowest measured   T-score of the lumbar spine, femoral neck, total proximal femur or 33%   radius. Note the change in terminology for diagnostic classification from   OSTEOPENIA to LOW BONE MASS. All trending for sequential exams will be   done using multiple vertebrae or the total proximal femur. Fracture risk   is based on the WHO Fracture Risk Assessment Tool (FRAX). If additional   information is needed or if you would like to discuss the results, please   do not hesitate to call me.       Thank you for referring this patient to [facility].   We are happy to be of service in support of you and your practice. If you   have any questions or suggestions to improve our service, please call me   at [phone].     Sincerely,     Mary Costa M.D. C.C.D.  [facility], [facility]           Please call with questions or contact us using [employer].    Sincerely,        Electronically signed by Chhaya Malone MD

## 2021-06-19 NOTE — PROGRESS NOTES
"HPI - 67 yo female    Lump in genital area -pea-size (not like a sore)  Fist noticed yesterday morning  No painful but itchy  Never had before    H/o vaginal atrophy and uses estrogen cream  Sister had breast cancer.      Current Outpatient Prescriptions   Medication Sig     biotin-silicon diox-L-cysteine 5,000 mcg-100 mg- 50 mg Tab Take 1 tablet by mouth daily.     calcium carbonate-vitamin D3 600 mg(1,500mg) -200 unit per tablet Take 1 tablet by mouth 2 (two) times a day.     cetirizine (ZYRTEC) 10 MG tablet Take 10 mg by mouth daily.     cholecalciferol, vitamin D3, 2,000 unit Tab Take 1 tablet (2,000 Units total) by mouth daily.     estradiol (ESTRACE) 0.01 % (0.1 mg/gram) vaginal cream Insert 2 g into the vagina as needed. INSERT 1 APPLICATOR 1 TO 2 TIMES WEEKLY AS NEEDED FOR SYMPTOMS OF POSTMENOPAUSAL ATROPHIC VAGINITIS.     melatonin 5 mg Tab Take 1 tablet by mouth bedtime.     multivitamin therapeutic (THERAGRAN) tablet Take 1 tablet by mouth daily.     VIT A/VIT C/VIT E/ZINC/COPPER (PRESERVISION AREDS ORAL) Take 2 tablets by mouth.      conjugated estrogens (PREMARIN) vaginal cream INSERT 1 APPLICATOR 1 TO 2 TIMES WEEKLY AS NEEDED FOR SYMPTOMS OF POSTMENOPAUSAL ATROPHIC VAGINITIS. (Patient not taking: Reported on 8/13/2018)     triamcinolone (KENALOG) 0.1 % ointment Apply daily to skin on knees (Patient not taking: Reported on 8/13/2018)     Vitals:    08/13/18 1153   BP: 132/70   Pulse: 72   Resp: 16   Temp: 98.1  F (36.7  C)   TempSrc: Oral   SpO2: 97%   Weight: 165 lb 12.8 oz (75.2 kg)   Height: 5' 9.17\" (1.757 m)     PHYSICAL EXAM   General Appearance: Awake and alert, in no acute distress  HEENT: neck is supple  CV: regular rate  Resp: No respiratory distress. Breathing comfortably  Musculoskeletal: moving limbs comfortably with not deficits or deformities  Skin: no rashes noted  Pelvic exam:   External genitalia: left proximal area of labia near urethra there is a 1 cm elliptical shaped mass under the " skin - nontender, mobile with  no lesion at the surface  Vaginal discharge: none    A/P  Vaginal mass -   Suspect cyst but given location near urethra will refer to gyn
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
## 2021-06-22 NOTE — PROGRESS NOTES
OFFICE VISIT NOTE      Assessment & Plan   Mohamud Mondragon is a 68 y.o. female who just had cataract surgery yesterday, which went well. She's here with increased bruising on her left knee. There are about 5 bruises present, none of which are tender.  Will check CBC and follow.    She'll be careful to allow herself to heal, but she'll keep up with her activities, especially exercise.      Diagnoses and all orders for this visit:    Bruising  -     HM1(CBC and Differential)  -     HM1 (CBC with Diff)        Chhaya Malone MD              Subjective:   Chief Complaint:  brusing on left knee and Follow-up (blood pressure)    68 y.o. female.     1) noticed lots of bruising around her left knee. Has NO recollection of bumping it. She does push ups which might cause the bruising, but there is none on the right knee. She's puzzled and a little worried.    2) yesterday's cataract surgery went well. She's glad and already looking forward to the next one. She'll be able to go without regular glasses then!    Current Outpatient Medications   Medication Sig     biotin-silicon diox-L-cysteine 5,000 mcg-100 mg- 50 mg Tab Take 1 tablet by mouth daily.     calcium carbonate-vitamin D3 600 mg(1,500mg) -200 unit per tablet Take 1 tablet by mouth 2 (two) times a day.     cetirizine (ZYRTEC) 10 MG tablet Take 10 mg by mouth daily.     cholecalciferol, vitamin D3, 2,000 unit Tab Take 1 tablet (2,000 Units total) by mouth daily.     clobetasol (TEMOVATE) 0.05 % ointment Use this medication three times daily on rash until gone     estradiol (ESTRACE) 0.01 % (0.1 mg/gram) vaginal cream Insert 2 g into the vagina as needed. INSERT 1 APPLICATOR 1 TO 2 TIMES WEEKLY AS NEEDED FOR SYMPTOMS OF POSTMENOPAUSAL ATROPHIC VAGINITIS.     melatonin 5 mg Tab Take 1 tablet by mouth bedtime.     multivitamin therapeutic (THERAGRAN) tablet Take 1 tablet by mouth daily.     VIT A/VIT C/VIT E/ZINC/COPPER (PRESERVISION AREDS ORAL) Take 2 tablets by mouth.   "      PSFHx: appropriate sections of PMH completed/filled in   Tobacco Status:  She  reports that  has never smoked. she has never used smokeless tobacco.    Review of Systems:  No fever.  No rash. All other systems negative except as noted above.    Objective:    /58   Pulse 62   Temp 97.8  F (36.6  C) (Oral)   Resp 12   Ht 5' 9.78\" (1.772 m)   Wt 164 lb 1.3 oz (74.4 kg)   SpO2 93%   BMI 23.69 kg/m    GENERAL: No acute distress.  Left knee - 7-8 bruises around the periphery of the knee, none are tender or swollen; previous bump on the anterior proximal tibia is smaller and is nontender  Right knee: no bruises    CBC normal    Spent 15 min face to face with patient with more the 50% spent in counseling, reviewing chart, and coordination of care and discussing bruising, healing.    "

## 2021-06-24 NOTE — PROGRESS NOTES
OFFICE VISIT NOTE      Assessment & Plan   Mohamud Mondragon is a 68 y.o. female with a rash - uncertain etiology. She'll keep well hydrated and use triamcinolone prn itch or redness. Since she believes this is a repeat of a rash she had 5 years ago that persisted, she'll see dermatology    Diagnoses and all orders for this visit:    Rash  -     Ambulatory referral to Dermatology        Chhaya Malone MD              Subjective:   Chief Complaint:  Rash (rash on chest and back x4 days, itchy, no pain)    68 y.o. female.     1) rash - started 2/20  Had strawberries 2/20, some raspberries around then - and this rash broke out. It is not really itchy but it seems to be spreading.    2) doing all shoveling at home    3) stress is OK - not over the top like some times.     Current Outpatient Medications   Medication Sig     carboxymethylcellulose (REFRESH LIQUIGEL) 1 % ophthalmic solution 1 drop.     biotin-silicon diox-L-cysteine 5,000 mcg-100 mg- 50 mg Tab Take 1 tablet by mouth daily.     calcium carbonate-vitamin D3 600 mg(1,500mg) -200 unit per tablet Take 1 tablet by mouth 2 (two) times a day.     cetirizine (ZYRTEC) 10 MG tablet Take 10 mg by mouth daily.     cholecalciferol, vitamin D3, 2,000 unit Tab Take 1 tablet (2,000 Units total) by mouth daily.     clobetasol (TEMOVATE) 0.05 % ointment Use this medication three times daily on rash until gone     estradiol (ESTRACE) 0.01 % (0.1 mg/gram) vaginal cream Insert 2 g into the vagina as needed. INSERT 1 APPLICATOR 1 TO 2 TIMES WEEKLY AS NEEDED FOR SYMPTOMS OF POSTMENOPAUSAL ATROPHIC VAGINITIS.     melatonin 5 mg Tab Take 1 tablet by mouth bedtime.     multivitamin therapeutic (THERAGRAN) tablet Take 1 tablet by mouth daily.     VIT A/VIT C/VIT E/ZINC/COPPER (PRESERVISION AREDS ORAL) Take 2 tablets by mouth.        PSFHx: appropriate sections of PMH completed/filled in   Tobacco Status:  She  reports that  has never smoked. she has never used smokeless  "tobacco.    Review of Systems:  No fever.  No diarrhea. All other systems negative except as noted above.    Objective:    /70   Pulse 68   Temp 98  F (36.7  C) (Oral)   Resp 12   Ht 5' 9.78\" (1.772 m)   Wt 168 lb 0.6 oz (76.2 kg)   BMI 24.26 kg/m    GENERAL: No acute distress.  HEENT: eyes clear; TMs clear, throat without swelling or erythema  NECK: supple  Ht: reg s1s2  Lungs: clear  Skin: very small dots of erythema over the chest, back and proximal limbs; some dots have raised firm bumps with a tiny bit of clear fluid inside (no pus); no scratch marks; there are a few up onto her neck and she thinks this all started behind her left ear      "

## 2021-06-25 NOTE — PROGRESS NOTES
Progress Notes by Reanna Ewing PT at 3/23/2017 10:00 AM     Author: Reanna Ewing PT Service: -- Author Type: Physical Therapist    Filed: 3/23/2017 11:20 AM Encounter Date: 3/23/2017 Status: Attested    : Reanna Ewing PT (Physical Therapist) Cosigner: Chhaya Malone MD at 3/23/2017  1:24 PM    Attestation signed by Chhaya Malone MD at 3/23/2017  1:24 PM    Looks great. She's quite motivated so I hope this goes well.                    [facility] Certification Request    March 23, 2017      Patient: Mohamud Mondragon  MR Number: [id number]  YOB: 1950  Date of Visit: 3/23/2017      Dear Dr. Malone:    Thank you for this referral.   We are seeing Mohamud Mondragon for Physical Therapy of urinary incontinence.    Medicare and/or Medicaid requires physician review and approval of the treatment plan. Please review the plan of care and verify that you agree with the therapy plan of care by co-signing this note.      Plan of Care  Authorization / Certification Start Date: 03/23/17  Authorization / Certification End Date: 06/21/17  Authorization / Certification Number of Visits: medicare  Communication with: Referral Source  Patient Related Instruction: Treatment plan and rationale;Nature of Condition;Self Care instruction;Basis of treatment;Next steps  Times per Week: 1  Number of Visits: up to 12  Discharge Planning: home program, self management  Therapeutic Exercise: Strengthening;Pelvic Floor retraining  Neuromuscular Reeducation: core  Manual Therapy: myofascial release;strain counterstrain    Goals:  Pt. will demonstrate/verbalize independence in self-management of condition in : 12 weeks  Pt. will be independent with home exercise program in : 12 weeks  Patient will void consistently every 2-3 hours : in 8-10 weeks  Patient will experience no leakage with coughing, sneezing, jumping : in 8-10 weeks  Patient will increase pelvic floor strength : to decrease urinary frequency to  no more than every _ hours;to decrease episodes of urinary incontinence to _/week;in 8-10 weeks  urinary frequency decrease to no more than every __ hours: 2 hours  decrease episodes of urinary incontinence to no more than _ / week: 1x      If you have any questions or concerns, please don't hesitate to call.    Sincerely,      Reanna Ewing, PT        Physician recommendation:     ___ Follow therapist's recommendation        ___ Modify therapy      *Physician co-signature indicates they certify the need for these services furnished within this plan and while under their care.        [facility]   Pelvic Initial Evaluation    Patient Name: Mohamud Mondragon  Date of evaluation: 3/23/2017   Visit #1  Referral Diagnosis: urinary incontinence  Referring provider: Chhaya Malone MD  Visit Diagnosis:     ICD-10-CM    1. Urinary urgency R39.15    2. Urge incontinence N39.41    3. Pelvic floor weakness N81.89    4. Osteoporosis M81.0        Assessment:     Mohamud Mondragon is a 66 y.o. female who presents to therapy today with chief complaints of urinary urge, incontinence. Onset date of sx was 6 months ago.  Functional impairments include bladder control.  Clinical findings include pelvic asymmetry, decreased hip ROM, tenderness of (L) pelvic floor, decreased PF recruitment and endurance.        Pt. is appropriate for skilled PT intervention as outlined in the Plan of Care (POC).  Pt. is a good candidate for skilled PT services to improve pain levels and function.    Goals:  Pt. will demonstrate/verbalize independence in self-management of condition in : 12 weeks  Pt. will be independent with home exercise program in : 12 weeks  Patient will void consistently every 2-3 hours : in 8-10 weeks  Patient will experience no leakage with coughing, sneezing, jumping : in 8-10 weeks  Patient will increase pelvic floor strength : to decrease urinary frequency to no more than every _ hours;to decrease episodes of urinary  incontinence to _/week;in 8-10 weeks  urinary frequency decrease to no more than every __ hours: 2 hours  decrease episodes of urinary incontinence to no more than _ / week: 1x    Patient's expectations/goals are realistic.    Barriers to Learning or Achieving Goals:  No Barriers.       Plan / Patient Instructions:        Plan of Care:   Authorization / Certification Start Date: 03/23/17  Authorization / Certification End Date: 06/21/17  Authorization / Certification Number of Visits: medicare  Communication with: Referral Source  Patient Related Instruction: Treatment plan and rationale;Nature of Condition;Self Care instruction;Basis of treatment;Next steps  Times per Week: 1  Number of Visits: up to 12  Discharge Planning: home program, self management  Therapeutic Exercise: Strengthening;Pelvic Floor retraining  Neuromuscular Reeducation: core  Manual Therapy: myofascial release;strain counterstrain    POC and pathology of condition were reviewed with patient.  Pt. is in agreement with the Plan of Care  A Home Exercise Program (HEP) was initiated today.    Plan for next visit: patient education, pelvic floor strengthening/bladder retraining, manual therapy as needed.      Subjective:         Social information:   Living Situation:   Occupation:retired   Work Status:NA   Equipment Available: None    History of Present Illness:    Mohamud is a 66 y.o. female who presents to therapy today with complaints of urinary incontinence.  Reports increased urgency in the last 6 months. Happens more at home, better when out of the house. Date of onset/duration of symptoms is 10/16. Onset was gradual. Symptoms are intermittent. She denies history of similar symptoms. She describes their previous level of function as not limited  Incontinence frequency is 3-4x/wk, but its variable. Sometimes waking at night to void. Voiding 5-8x/day, every 1-2 hours. Rates incontinence severity at 2/10.  Some difficulty with complete bladder  emptying. Triggers more prominent at home.  Has difficulty getting to the bathroom in time. She is not able to identify specific triggers. Denies leakage with coughing/sneezing etc. Denies back pain. She exercises regularly, including weights and cardio.   PMH includes osteoporosis, (L) hip ORIF about 4 years ago, hysterectomy 30 years ago .     Pain Ratin    Functional limitations are described as occurring with:   bowel/bladder conrol    Patient reports no previous interventions.        Objective:      Note: Items left blank indicates the item was not performed or not indicated at the time of the evaluation.      Posture Observation:      Lumbopelvic complex: Moderate scoliosis  Moderately decreased lumbar lordosis  Pelvic alignment: (L) PSIS, sacral base ant    Lumbar ROM:  Date: 3/23/17     *Indicate scale AROM AROM AROM   Lumbar Flexion WNL     Lumbar Extension WNL      Right Left Right Left Right Left   Lumbar Sidebending         Lumbar Rotation           Hip ROM  Date:      Hip ROM( ) AROM in degrees AROM in degrees AROM in degrees    Right Left Right Left Right Left   Hip Flexion (0-120 )         Hip Abduction (0-45 )         Hip External Rotation (0-50 )         Hip Internal Rotation (0-40 )         Hip Extension (0-15 )          PROM in degrees PROM in degrees PROM in degrees    Right Left Right Left Right Left   Hip Flexion (0-120 ) WNL WNL       Hip Abduction (0-45 )         Hip External Rotation (0-50 ) WNL 33       Hip Internal Rotation (0-40 ) 33 24       Hip Extension (0-15 )           FADIR     neg  neg  KURT    pos  pos    Palpation: No tenderness of sup/ant/lat pubis, EPF/kidney SCS points.     Pelvis  Bladder: Motility WNL     External Exam  Gapping:   Skin Integrity:  Introitus tight  Pelvic Clock: no tenderness noted on external palpation.     Internal Exam  Sensation:   Muscle tone: short (B)  Prolapse:  Strength - Vaginal   MMT: 2   Endurance: 3 sec   Repetitions: 2  Decreased  coordination of PF musculature with demonstration of kegel.  Contraction endurance decreases quickly after 1-2 reps.    Palpation  Compressor urethrae:  Mild tenderness  on the left.  Deep transverse perineal:  Mild tenderness  on the left.      Treatment Today  Late/paperwork 15 min  TREATMENT MINUTES COMMENTS   Evaluation 40 Patient consents to internal exam/treatment.   Self-care/ Home management     Manual therapy     Neuromuscular Re-education     Therapeutic Activity     Therapeutic Exercises 10 Discussed findings, plan of care, anatomy, instructed in exercises.   Gait training     Modality__________________                Total 50    Blank areas are intentional and mean the treatment did not include these items.     PT Evaluation Code: (Please list factors)  Patient History/Comorbidities: 2  Examination: 3  Clinical Presentation: evolving  Clinical Decision Making: mod    Patient History/  Comorbidities Examination  (body structures and functions, activity limitations, and/or participation restrictions) Clinical Presentation Clinical Decision Making (Complexity)   No documented Comorbidities or personal factors 1-2 Elements Stable and/or uncomplicated Low   1-2 documented comorbidities or personal factor 3 Elements Evolving clinical presentation with changing characteristics Moderate   3-4 documented comorbidities or personal factors 4 or more Unstable and unpredictable High              Reanna Ewing  3/23/2017  10:11 AM

## 2021-07-03 NOTE — ADDENDUM NOTE
Addendum Note by Uvaldo Malone MD at 8/2/2017  7:45 AM     Author: Uvaldo Malone MD Service: -- Author Type: Physician    Filed: 8/2/2017  7:45 AM Encounter Date: 7/31/2017 Status: Signed    : Uvaldo Malone MD (Physician)    Addended by: UVALDO MALONE on: 8/2/2017 07:45 AM        Modules accepted: Orders

## 2021-07-21 ENCOUNTER — RECORDS - HEALTHEAST (OUTPATIENT)
Dept: ADMINISTRATIVE | Facility: CLINIC | Age: 71
End: 2021-07-21

## 2021-08-15 ENCOUNTER — HEALTH MAINTENANCE LETTER (OUTPATIENT)
Age: 71
End: 2021-08-15

## 2021-10-11 ENCOUNTER — HEALTH MAINTENANCE LETTER (OUTPATIENT)
Age: 71
End: 2021-10-11

## 2021-10-12 ENCOUNTER — MYC MEDICAL ADVICE (OUTPATIENT)
Dept: FAMILY MEDICINE | Facility: CLINIC | Age: 71
End: 2021-10-12

## 2021-10-12 DIAGNOSIS — E78.00 PURE HYPERCHOLESTEROLEMIA: ICD-10-CM

## 2021-10-12 DIAGNOSIS — Z00.00 PREVENTATIVE HEALTH CARE: Primary | ICD-10-CM

## 2021-10-22 ENCOUNTER — LAB (OUTPATIENT)
Dept: LAB | Facility: CLINIC | Age: 71
End: 2021-10-22
Payer: COMMERCIAL

## 2021-10-22 DIAGNOSIS — R41.3 MEMORY DIFFICULTY: ICD-10-CM

## 2021-10-22 DIAGNOSIS — E78.00 PURE HYPERCHOLESTEROLEMIA: ICD-10-CM

## 2021-10-22 DIAGNOSIS — Z00.00 PREVENTATIVE HEALTH CARE: ICD-10-CM

## 2021-10-22 LAB
ALBUMIN SERPL-MCNC: 4 G/DL (ref 3.5–5)
ALP SERPL-CCNC: 107 U/L (ref 45–120)
ALT SERPL W P-5'-P-CCNC: 21 U/L (ref 0–45)
ANION GAP SERPL CALCULATED.3IONS-SCNC: 9 MMOL/L (ref 5–18)
AST SERPL W P-5'-P-CCNC: 24 U/L (ref 0–40)
BASOPHILS # BLD AUTO: 0.1 10E3/UL (ref 0–0.2)
BASOPHILS NFR BLD AUTO: 1 %
BILIRUB SERPL-MCNC: 1.1 MG/DL (ref 0–1)
BUN SERPL-MCNC: 14 MG/DL (ref 8–28)
CALCIUM SERPL-MCNC: 9.9 MG/DL (ref 8.5–10.5)
CHLORIDE BLD-SCNC: 100 MMOL/L (ref 98–107)
CHOLEST SERPL-MCNC: 200 MG/DL
CO2 SERPL-SCNC: 28 MMOL/L (ref 22–31)
CREAT SERPL-MCNC: 0.78 MG/DL (ref 0.6–1.1)
EOSINOPHIL # BLD AUTO: 0.2 10E3/UL (ref 0–0.7)
EOSINOPHIL NFR BLD AUTO: 5 %
ERYTHROCYTE [DISTWIDTH] IN BLOOD BY AUTOMATED COUNT: 12.1 % (ref 10–15)
FASTING STATUS PATIENT QL REPORTED: YES
GFR SERPL CREATININE-BSD FRML MDRD: 76 ML/MIN/1.73M2
GLUCOSE BLD-MCNC: 94 MG/DL (ref 70–125)
HCT VFR BLD AUTO: 38 % (ref 35–47)
HDLC SERPL-MCNC: 72 MG/DL
HGB BLD-MCNC: 12.9 G/DL (ref 11.7–15.7)
IMM GRANULOCYTES # BLD: 0 10E3/UL
IMM GRANULOCYTES NFR BLD: 0 %
LDLC SERPL CALC-MCNC: 115 MG/DL
LYMPHOCYTES # BLD AUTO: 1.1 10E3/UL (ref 0.8–5.3)
LYMPHOCYTES NFR BLD AUTO: 25 %
MCH RBC QN AUTO: 31.2 PG (ref 26.5–33)
MCHC RBC AUTO-ENTMCNC: 33.9 G/DL (ref 31.5–36.5)
MCV RBC AUTO: 92 FL (ref 78–100)
MONOCYTES # BLD AUTO: 0.3 10E3/UL (ref 0–1.3)
MONOCYTES NFR BLD AUTO: 8 %
NEUTROPHILS # BLD AUTO: 2.7 10E3/UL (ref 1.6–8.3)
NEUTROPHILS NFR BLD AUTO: 61 %
PLATELET # BLD AUTO: 197 10E3/UL (ref 150–450)
POTASSIUM BLD-SCNC: 4.6 MMOL/L (ref 3.5–5)
PROT SERPL-MCNC: 7.1 G/DL (ref 6–8)
RBC # BLD AUTO: 4.14 10E6/UL (ref 3.8–5.2)
SODIUM SERPL-SCNC: 137 MMOL/L (ref 136–145)
TRIGL SERPL-MCNC: 63 MG/DL
WBC # BLD AUTO: 4.4 10E3/UL (ref 4–11)

## 2021-10-22 PROCEDURE — 36415 COLL VENOUS BLD VENIPUNCTURE: CPT

## 2021-10-22 PROCEDURE — 82607 VITAMIN B-12: CPT

## 2021-10-22 PROCEDURE — 85025 COMPLETE CBC W/AUTO DIFF WBC: CPT

## 2021-10-22 PROCEDURE — 80061 LIPID PANEL: CPT

## 2021-10-22 PROCEDURE — 80053 COMPREHEN METABOLIC PANEL: CPT

## 2021-10-26 ENCOUNTER — OFFICE VISIT (OUTPATIENT)
Dept: FAMILY MEDICINE | Facility: CLINIC | Age: 71
End: 2021-10-26
Payer: COMMERCIAL

## 2021-10-26 VITALS
OXYGEN SATURATION: 97 % | HEART RATE: 77 BPM | SYSTOLIC BLOOD PRESSURE: 130 MMHG | BODY MASS INDEX: 23.41 KG/M2 | RESPIRATION RATE: 16 BRPM | TEMPERATURE: 99.1 F | WEIGHT: 158.5 LBS | DIASTOLIC BLOOD PRESSURE: 68 MMHG

## 2021-10-26 DIAGNOSIS — F51.01 PRIMARY INSOMNIA: ICD-10-CM

## 2021-10-26 DIAGNOSIS — Z12.31 VISIT FOR SCREENING MAMMOGRAM: ICD-10-CM

## 2021-10-26 DIAGNOSIS — R35.1 NOCTURIA: ICD-10-CM

## 2021-10-26 DIAGNOSIS — F51.02 ADJUSTMENT INSOMNIA: ICD-10-CM

## 2021-10-26 DIAGNOSIS — R41.3 MEMORY DIFFICULTY: ICD-10-CM

## 2021-10-26 DIAGNOSIS — Z23 NEED FOR IMMUNIZATION AGAINST INFLUENZA: ICD-10-CM

## 2021-10-26 DIAGNOSIS — R10.31 RIGHT GROIN PAIN: ICD-10-CM

## 2021-10-26 DIAGNOSIS — Z23 NEED FOR COVID-19 VACCINE: ICD-10-CM

## 2021-10-26 DIAGNOSIS — Z00.00 PREVENTATIVE HEALTH CARE: Primary | ICD-10-CM

## 2021-10-26 PROBLEM — M17.0 BILATERAL PRIMARY OSTEOARTHRITIS OF KNEE: Status: ACTIVE | Noted: 2019-07-09

## 2021-10-26 PROBLEM — G47.00 INSOMNIA: Status: ACTIVE | Noted: 2021-10-26

## 2021-10-26 PROBLEM — M25.50 POLYARTHRALGIA: Status: ACTIVE | Noted: 2019-07-09

## 2021-10-26 PROBLEM — R32 URINARY INCONTINENCE: Status: ACTIVE | Noted: 2017-03-22

## 2021-10-26 PROBLEM — M81.0 OSTEOPOROSIS: Status: ACTIVE | Noted: 2021-10-26

## 2021-10-26 LAB
ALBUMIN UR-MCNC: NEGATIVE MG/DL
APPEARANCE UR: CLEAR
BACTERIA #/AREA URNS HPF: ABNORMAL /HPF
BILIRUB UR QL STRIP: NEGATIVE
COLOR UR AUTO: YELLOW
GLUCOSE UR STRIP-MCNC: NEGATIVE MG/DL
HGB UR QL STRIP: NEGATIVE
KETONES UR STRIP-MCNC: NEGATIVE MG/DL
LEUKOCYTE ESTERASE UR QL STRIP: ABNORMAL
NITRATE UR QL: NEGATIVE
PH UR STRIP: 7 [PH] (ref 5–7)
RBC #/AREA URNS AUTO: ABNORMAL /HPF
SP GR UR STRIP: 1.02 (ref 1–1.03)
SQUAMOUS #/AREA URNS AUTO: ABNORMAL /LPF
UROBILINOGEN UR STRIP-ACNC: 0.2 E.U./DL
VIT B12 SERPL-MCNC: 826 PG/ML (ref 213–816)
WBC #/AREA URNS AUTO: ABNORMAL /HPF
YEAST #/AREA URNS HPF: ABNORMAL /HPF

## 2021-10-26 PROCEDURE — 81001 URINALYSIS AUTO W/SCOPE: CPT | Performed by: FAMILY MEDICINE

## 2021-10-26 PROCEDURE — 99397 PER PM REEVAL EST PAT 65+ YR: CPT | Performed by: FAMILY MEDICINE

## 2021-10-26 PROCEDURE — 0003A COVID-19,PF,PFIZER (12+ YRS): CPT | Performed by: FAMILY MEDICINE

## 2021-10-26 PROCEDURE — 91300 COVID-19,PF,PFIZER (12+ YRS): CPT | Performed by: FAMILY MEDICINE

## 2021-10-26 PROCEDURE — G0008 ADMIN INFLUENZA VIRUS VAC: HCPCS | Performed by: FAMILY MEDICINE

## 2021-10-26 PROCEDURE — 87086 URINE CULTURE/COLONY COUNT: CPT | Performed by: FAMILY MEDICINE

## 2021-10-26 PROCEDURE — 87088 URINE BACTERIA CULTURE: CPT | Performed by: FAMILY MEDICINE

## 2021-10-26 PROCEDURE — 90662 IIV NO PRSV INCREASED AG IM: CPT | Performed by: FAMILY MEDICINE

## 2021-10-26 RX ORDER — VIT C/B6/B5/MAGNESIUM/HERB 173 50-5-6-5MG
CAPSULE ORAL
COMMUNITY

## 2021-10-26 RX ORDER — ANTIOX #8/OM3/DHA/EPA/LUT/ZEAX 250-2.5 MG
CAPSULE ORAL
Qty: 90 CAPSULE | Refills: 4 | COMMUNITY
Start: 2021-10-26

## 2021-10-26 RX ORDER — AMITRIPTYLINE HYDROCHLORIDE 10 MG/1
10 TABLET ORAL AT BEDTIME
Qty: 90 TABLET | Refills: 4 | Status: SHIPPED | OUTPATIENT
Start: 2021-10-26 | End: 2021-10-28

## 2021-10-26 ASSESSMENT — ACTIVITIES OF DAILY LIVING (ADL): CURRENT_FUNCTION: NO ASSISTANCE NEEDED

## 2021-10-26 NOTE — PROGRESS NOTES
"SUBJECTIVE:   Mohamud Mondragon is a 71 year old female who presents for Preventive Visit.    Patient has been advised of split billing requirements and indicates understanding: Yes   Are you in the first 12 months of your Medicare coverage?  No    Healthy Habits:     In general, how would you rate your overall health?  Good    Frequency of exercise:  1 day/week    Duration of exercise:  30-45 minutes    Do you usually eat at least 4 servings of fruit and vegetables a day, include whole grains    & fiber and avoid regularly eating high fat or \"junk\" foods?  Yes    Taking medications regularly:  Yes    Medication side effects:  None    Ability to successfully perform activities of daily living:  No assistance needed    Home Safety:  Throw rugs in the hallway and lack of grab bars in the bathroom    Hearing Impairment:  No hearing concerns    In the past 6 months, have you been bothered by leaking of urine?  No    In general, how would you rate your overall mental or emotional health?  Good      PHQ-2 Total Score: 2    Additional concerns today:  No    Do you feel safe in your environment? Yes    Have you ever done Advance Care Planning? (For example, a Health Directive, POLST, or a discussion with a medical provider or your loved ones about your wishes): Yes, patient states has an Advance Care Planning document and will bring a copy to the clinic.    Fall risk       Cognitive Screening   1) Repeat 3 items (Leader, Season, Table)    2) Clock draw: correct  3) 3 item recall: 3/3  Results: NORRMAL    Mini-CogTM Copyright KESHAWN Worley. Licensed by the author for use in [facility]; reprinted with permission ([email or web address]@.[email or web address]). All rights reserved.      Reviewed and updated as needed this visit by clinical staff  Tobacco  Allergies  Meds   Med Hx  Surg Hx           Reviewed and updated as needed this visit by Provider      Med Hx  Surg Hx          Social History     Tobacco Use     Smoking status: Never Smoker     " Smokeless tobacco: Never Used     Tobacco comment: No passive smoke exposure   Substance Use Topics     Alcohol use: No       Alcohol Use 10/26/2021   Prescreen: >3 drinks/day or >7 drinks/week? No       Current providers sharing in care for this patient include:   Patient Care Team:  Chhaya Malone MD as PCP - General (Family Medicine)  El Mejia MD as Assigned Heart and Vascular Provider  Chhaya Malone MD as Assigned PCP    The following health maintenance items are reviewed in Epic and correct as of today:  Health Maintenance Due   Topic Date Due     ANNUAL REVIEW OF  ORDERS  Never done     MAMMO SCREENING  05/30/2021     Lab work is in process  BP Readings from Last 3 Encounters:   10/26/21 130/68   03/15/21 (!) 150/78   10/20/20 (!) 144/72    Wt Readings from Last 3 Encounters:   10/26/21 71.9 kg (158 lb 8 oz)   03/15/21 72.1 kg (159 lb)   10/20/20 71.7 kg (158 lb)                  Current Outpatient Medications   Medication Sig Dispense Refill     carboxymethylcellulose (REFRESH PLUS) 0.5 % SOLN ophthalmic solution        cetirizine (ZYRTEC) 10 MG tablet Take 10 mg by mouth       fish oil-omega-3 fatty acids 1000 MG capsule Take 2 g by mouth       melatonin 5 MG tablet Take 1 tablet by mouth       Multiple Vitamins-Minerals (ONCOVITE) TABS Take 1 tablet by mouth       Multiple Vitamins-Minerals (PRESERVISION AREDS 2) CAPS One PO daily 90 capsule 4     Turmeric 500 MG CAPS        amitriptyline (ELAVIL) 10 MG tablet Take 1-2 tablets (10-20 mg) by mouth At Bedtime 180 tablet 4     aspirin (ASA) 81 MG EC tablet Take 81 mg by mouth (Patient not taking: Reported on 10/26/2021)       chlorhexidine (PERIDEX) 0.12 % solution RINSE MOUTH TWICE DAILY STARTING 2 DAYS PRIOR TO APPOINTMENT       clindamycin (CLEOCIN) 300 MG capsule TAKE 1 CAPSULE BY MOUTH EVERY 6 HOURS STARTING 2 DAYS BEFORE APPOINTMENT       clobetasol (TEMOVATE) 0.05 % external ointment  (Patient not taking: Reported on  "10/26/2021)       Specialty Vitamins Products (BRITTANEY MATRIX 5000) TABS Take 1 tablet by mouth (Patient not taking: Reported on 10/26/2021)       Allergies   Allergen Reactions     Cephalosporins Rash     Other reaction(s): *Unknown     Erythromycin Rash     Rash per patient.  Rash per patient.       Penicillins Rash     Rash per patient.  Rash per patient.       Mammogram Screening: Mammogram Screening: Recommended mammography every 1-2 years with patient discussion and risk factor consideration. Recommend every other year mammo, 3D.    pneumo 23 recommended but in the future. Priority given today to flu shot and Covid booster.      Review of Systems  Other than what is mentioned above, no additional issues    OBJECTIVE:   /68   Pulse 77   Temp 99.1  F (37.3  C) (Oral)   Resp 16   Wt 71.9 kg (158 lb 8 oz)   SpO2 97%   BMI 23.41 kg/m   Estimated body mass index is 23.41 kg/m  as calculated from the following:    Height as of 3/15/21: 1.753 m (5' 9\").    Weight as of this encounter: 71.9 kg (158 lb 8 oz).  Physical Exam  GENERAL: healthy, alert and no distress  EYES: Eyes grossly normal to inspection, PERRL and conjunctivae and sclerae normal  HENT: ear canals and TM's normal, nose and mouth without ulcers or lesions  NECK: no adenopathy, no asymmetry, masses, or scars and thyroid normal to palpation  RESP: lungs clear to auscultation - no rales, rhonchi or wheezes  BREAST: normal without masses, tenderness or nipple discharge and no palpable axillary masses or adenopathy  CV: regular rate and rhythm, normal S1 S2, no S3 or S4, no murmur, click or rub, no peripheral edema and peripheral pulses strong  ABDOMEN: soft, nontender, no hepatosplenomegaly, no masses and bowel sounds normal  MS: no gross musculoskeletal defects noted, no edema  SKIN: no suspicious lesions or rashes  NEURO: Normal strength and tone, mentation intact and speech normal  PSYCH: mentation appears normal, affect " normal/bright    Diagnostic Test Results:  Labs reviewed in Epic  Results for orders placed or performed in visit on 10/26/21   UA Macro with Reflex to Micro and Culture - lab collect     Status: Abnormal    Specimen: Urine, Midstream   Result Value Ref Range    Color Urine Yellow Colorless, Straw, Light Yellow, Yellow    Appearance Urine Clear Clear    Glucose Urine Negative Negative mg/dL    Bilirubin Urine Negative Negative    Ketones Urine Negative Negative mg/dL    Specific Gravity Urine 1.020 1.005 - 1.030    Blood Urine Negative Negative    pH Urine 7.0 5.0 - 7.0    Protein Albumin Urine Negative Negative mg/dL    Urobilinogen Urine 0.2 0.2, 1.0 E.U./dL    Nitrite Urine Negative Negative    Leukocyte Esterase Urine Trace (A) Negative   Urine Microscopic     Status: Abnormal   Result Value Ref Range    Bacteria Urine None Seen None Seen /HPF    RBC Urine 0-2 0-2 /HPF /HPF    WBC Urine 0-5 0-5 /HPF /HPF    Squamous Epithelials Urine Few (A) None Seen /LPF    Budding Yeast Urine Few (A) None Seen /HPF    Narrative    Urine Culture not indicated   Urine Culture Aerobic Bacterial     Status: Abnormal    Specimen: Urine, Midstream   Result Value Ref Range    Culture (A)      10,000-50,000 CFU/mL Coagulase negative Staphylococcus       ASSESSMENT / PLAN:   (Z23) Need for immunization against influenza  (primary encounter diagnosis)  Comment: given  Plan: INFLUENZA, QUAD, HD, PF, 65+ (FLUZONE HD)            (R41.3) Memory difficulty  Comment: uncertain what the root of this is; check B12 but she is not a vegan;   Plan: Vitamin B12            (F51.01) Primary insomnia  Comment: 10mg amitriptyline is not helping; she agrees to try 2 tabs which is 20mg.  Plan: DISCONTINUED: amitriptyline (ELAVIL) 10 MG         tablet        Addendum - 20mg worked well so she will continue this    (F51.02) Adjustment insomnia  Comment: as above  Plan: Multiple Vitamins-Minerals (PRESERVISION AREDS         2) CAPS            (R10.31)  "Right groin pain  Comment: uncertain of cause. Continue stretching it, continue exercise, expect healing in a few months  Plan: Physical Therapy Referral            (Z23) Need for COVID-19 vaccine  Comment: booster given  Plan: COVID-19,PF,PFIZER (12+ Yrs)            (Z12.31) Visit for screening mammogram  Comment: due; recommended 3D  Plan: MA Screen Bilateral w/Cosme, CANCELED: *MA         Screening Digital Bilateral            (Z00.00) Preventative health care  Comment: done today. She's on top of her health but she has some issues to keep track of  Plan: Urine Microscopic            (R35.1) Nocturia  Comment: try increased dose of amitriptyline; might have to check her for a different issue or consider oxybutinin  Plan: Urine Culture Aerobic Bacterial                COUNSELING:  Reviewed preventive health counseling, as reflected in patient instructions       Regular exercise       Healthy diet/nutrition       Vision screening       Dental care       Bladder control       Fall risk prevention       Osteoporosis prevention/bone health       Advanced Planning     Estimated body mass index is 23.41 kg/m  as calculated from the following:    Height as of 3/15/21: 1.753 m (5' 9\").    Weight as of this encounter: 71.9 kg (158 lb 8 oz).        She reports that she has never smoked. She has never used smokeless tobacco.      Appropriate preventive services were discussed with this patient, including applicable screening as appropriate for cardiovascular disease, diabetes, osteopenia/osteoporosis, and glaucoma.  As appropriate for age/gender, discussed screening for colorectal cancer, prostate cancer, breast cancer, and cervical cancer. Checklist reviewing preventive services available has been given to the patient.    Reviewed patients plan of care and provided an AVS. The Basic Care Plan (routine screening as documented in Health Maintenance) for Mohamud meets the Care Plan requirement. This Care Plan has been established " and reviewed with the Patient.    Counseling Resources:  ATP IV Guidelines  Pooled Cohorts Equation Calculator  Breast Cancer Risk Calculator  Breast Cancer: Medication to Reduce Risk  FRAX Risk Assessment  ICSI Preventive Guidelines  Dietary Guidelines for Americans, 2010  USDA's MyPlate  ASA Prophylaxis  Lung CA Screening    Chhaya Malone MD  [facility]    Identified Health Risks:

## 2021-10-28 ENCOUNTER — MYC MEDICAL ADVICE (OUTPATIENT)
Dept: FAMILY MEDICINE | Facility: CLINIC | Age: 71
End: 2021-10-28

## 2021-10-28 DIAGNOSIS — N30.00 ACUTE CYSTITIS WITHOUT HEMATURIA: Primary | ICD-10-CM

## 2021-10-28 DIAGNOSIS — F51.01 PRIMARY INSOMNIA: ICD-10-CM

## 2021-10-28 LAB — BACTERIA UR CULT: ABNORMAL

## 2021-10-28 RX ORDER — SULFAMETHOXAZOLE/TRIMETHOPRIM 800-160 MG
1 TABLET ORAL 2 TIMES DAILY
Qty: 6 TABLET | Refills: 0 | Status: SHIPPED | OUTPATIENT
Start: 2021-10-28 | End: 2021-10-31

## 2021-10-28 RX ORDER — AMITRIPTYLINE HYDROCHLORIDE 10 MG/1
10-20 TABLET ORAL AT BEDTIME
Qty: 180 TABLET | Refills: 4 | Status: SHIPPED | OUTPATIENT
Start: 2021-10-28 | End: 2023-01-19

## 2021-10-28 NOTE — RESULT ENCOUNTER NOTE
Please notify patient urine culture showed a bladder infection.   Rx for bactrim to take twice daily for 3 days was sent to her pharmacy.       Dr. Michelle Berry  10/28/2021

## 2021-11-23 ENCOUNTER — HOSPITAL ENCOUNTER (OUTPATIENT)
Dept: PHYSICAL THERAPY | Facility: REHABILITATION | Age: 71
End: 2021-11-23
Attending: FAMILY MEDICINE
Payer: COMMERCIAL

## 2021-11-23 DIAGNOSIS — R10.31 RIGHT GROIN PAIN: ICD-10-CM

## 2021-11-23 PROCEDURE — 97140 MANUAL THERAPY 1/> REGIONS: CPT | Mod: GP | Performed by: PHYSICAL THERAPIST

## 2021-11-23 PROCEDURE — 97110 THERAPEUTIC EXERCISES: CPT | Mod: GP | Performed by: PHYSICAL THERAPIST

## 2021-11-23 PROCEDURE — 97161 PT EVAL LOW COMPLEX 20 MIN: CPT | Mod: GP | Performed by: PHYSICAL THERAPIST

## 2021-11-23 NOTE — PROGRESS NOTES
[facility]    OUTPATIENT PHYSICAL THERAPY ORTHOPEDIC EVALUATION  PLAN OF TREATMENT FOR OUTPATIENT REHABILITATION  (COMPLETE FOR INITIAL CLAIMS ONLY)  Patient's Last Name, First Name, M.I.  YOB: 1950  Mohamud Mondragon    Provider s Name:  [facility]   Medical Record No.  [id number]   Start of Care Date:  11/23/21   Onset Date:  09/01/21   Type:     _X__PT   ___OT   ___SLP Medical Diagnosis:  R groin pain     PT Diagnosis:  R hip pain   Visits from SOC:  1      _________________________________________________________________________________  Plan of Treatment/Functional Goals:  joint mobilization,manual therapy,ROM,strengthening,stretching           Goals     Goal Description: Pt will be independent with his HEP for ongoing symptom management in 12 weeks.          Goal Description: Pt will report no difficulty or pain when bending over or pulling R leg up for lower body dressing in 12 weeks.                                                                         Therapy Frequency:  1 time/week  Predicted Duration of Therapy Intervention:  8-12 weeks; up to 8 visits    Jaimee Land, PT                 I CERTIFY THE NEED FOR THESE SERVICES FURNISHED UNDER        THIS PLAN OF TREATMENT AND WHILE UNDER MY CARE     (Physician co-signature of this document indicates review and certification of the therapy plan).                       Certification Date From:  11/23/21   Certification Date To:  02/21/22    Referring Provider:  Dr. Chhaya Malone    Initial Assessment        See Epic Evaluation Start of Care Date: 11/23/21 11/23/21 0800   General Information   Type of Visit Initial OP Ortho PT Evaluation   Start of Care Date 11/23/21   Referring Physician Dr. Chhaya Malone   Orders Evaluate and Treat   Date of Order 10/26/21   Certification Required? Yes    Medical Diagnosis R groin pain   Surgical/Medical history reviewed Yes   General Information Comments h/o L hip fx with s/p hardware, no replacement   Body Part(s)   Body Part(s) Hip   Presentation and Etiology   Pertinent history of current problem (include personal factors and/or comorbidities that impact the POC) Pt reports insidious onset of R groing pain that began 2 months ago.  Unable to identify any causes of it.     Impairments A. Pain;E. Decreased flexibility   Functional Limitations perform activities of daily living   Symptom Location R anterior hip/groin   How/Where did it occur From insidious onset   Onset date of current episode/exacerbation 09/01/21   Chronicity New   Pain rating (0-10 point scale) Best (/10);Worst (/10)  (Current: 1/10)   Best (/10) 1   Worst (/10) 4   Pain quality C. Aching   Frequency of pain/symptoms A. Constant   Pain/symptoms are: The same all the time   Pain/symptoms exacerbated by I. Bending  (pulling her leg up.  Getting up from a lower seat/toilet)   Pain/symptoms eased by   (Nothing; pt is able to still walk 2 miles/day without pain)   Progression of symptoms since onset: Unchanged   Prior Level of Function   Functional Level Prior Comment Pt is very active and enjoys walking/jogging 2 miles each morning.  She continues to do that without difficulty.    Current Level of Function   Current Community Support Family/friend caregiver   Patient role/employment history F. Retired   Living environment [place]  (No steps in home or to enter)   Current equipment-Gait/Locomotion None   Fall Risk Screen   Fall screen completed by PT   Have you fallen 2 or more times in the past year? No   Have you fallen and had an injury in the past year? No   Is patient a fall risk? No   Abuse Screen (yes response referral indicated)   Feels Unsafe at Home or Work/School no   Feels Threatened by Someone no   Does Anyone Try to Keep You From Having Contact with Others or Doing Things  Outside Your Home? no   Physical Signs of Abuse Present no   Hip Objective Findings   Side (if bilateral, select both right and left) Right   Gait/Locomotion Mildly antalgic   Hip ROM Comments Grossly WNL with exception of limited R hip IR and ADDuction   Lumbar ROM WNL   Pelvic Screen WNL   Hip/Knee Strength Comments B LE MMT WNL, grossly 5/5   Accessory Motion/Joint Mobility Limited R hip joint mobility   Aaron Flexibility Test Limited on R   Right Hamstring Flexibility Mildly limited   Planned Therapy Interventions   Planned Therapy Interventions joint mobilization;manual therapy;ROM;strengthening;stretching   Clinical Impression   Criteria for Skilled Therapeutic Interventions Met yes, treatment indicated   PT Diagnosis R hip pain   Influenced by the following impairments Limited R hip joint mobility   Functional limitations due to impairments Difficulty with LB dressing   Clinical Presentation Stable/Uncomplicated   Clinical Decision Making (Complexity) Low complexity   Therapy Frequency 1 time/week   Predicted Duration of Therapy Intervention (days/wks) 8-12 weeks; up to 8 visits   Risk & Benefits of therapy have been explained Yes   Patient, Family & other staff in agreement with plan of care Yes   Clinical Impression Comments Pt is a 72 yo female who presents with idiopathic R groin pain that has been present for the past 2 months.  PT eval finds that there is limited R hip joint mobility likely contributing to her pain presentation.  Pt would be appropriate for skilled 1:1 PT to address her physical and functional impairments listed above.   Education Assessment   Barriers to Learning No barriers   ORTHO GOALS   PT Ortho Eval Goals 1;2   Ortho Goal 1   Goal Description Pt will be independent with his HEP for ongoing symptom management in 12 weeks.   Ortho Goal 2   Goal Description Pt will report no difficulty or pain when bending over or pulling R leg up for lower body dressing in 12 weeks.   Total  Evaluation Time   PT Eval, Low Complexity Minutes (12836) 15   Therapy Certification   Certification date from 11/23/21   Certification date to 02/21/22   Medical Diagnosis R groin pain

## 2021-11-29 ENCOUNTER — HOSPITAL ENCOUNTER (OUTPATIENT)
Dept: PHYSICAL THERAPY | Facility: REHABILITATION | Age: 71
End: 2021-11-29
Payer: COMMERCIAL

## 2021-11-29 DIAGNOSIS — R10.31 RIGHT GROIN PAIN: Primary | ICD-10-CM

## 2021-11-29 PROCEDURE — 97110 THERAPEUTIC EXERCISES: CPT | Mod: GP | Performed by: PHYSICAL THERAPY ASSISTANT

## 2021-11-29 PROCEDURE — 97140 MANUAL THERAPY 1/> REGIONS: CPT | Mod: GP | Performed by: PHYSICAL THERAPY ASSISTANT

## 2021-12-06 ENCOUNTER — HOSPITAL ENCOUNTER (OUTPATIENT)
Dept: PHYSICAL THERAPY | Facility: REHABILITATION | Age: 71
End: 2021-12-06
Payer: COMMERCIAL

## 2021-12-06 DIAGNOSIS — R10.31 RIGHT GROIN PAIN: Primary | ICD-10-CM

## 2021-12-06 PROCEDURE — 97140 MANUAL THERAPY 1/> REGIONS: CPT | Mod: GP | Performed by: PHYSICAL THERAPY ASSISTANT

## 2021-12-06 PROCEDURE — 97110 THERAPEUTIC EXERCISES: CPT | Mod: GP | Performed by: PHYSICAL THERAPY ASSISTANT

## 2021-12-11 ENCOUNTER — ANCILLARY PROCEDURE (OUTPATIENT)
Dept: MAMMOGRAPHY | Facility: CLINIC | Age: 71
End: 2021-12-11
Attending: FAMILY MEDICINE
Payer: COMMERCIAL

## 2021-12-11 DIAGNOSIS — Z12.31 VISIT FOR SCREENING MAMMOGRAM: ICD-10-CM

## 2021-12-11 PROCEDURE — 77063 BREAST TOMOSYNTHESIS BI: CPT

## 2021-12-13 ENCOUNTER — HOSPITAL ENCOUNTER (OUTPATIENT)
Dept: PHYSICAL THERAPY | Facility: REHABILITATION | Age: 71
End: 2021-12-13
Payer: COMMERCIAL

## 2021-12-13 DIAGNOSIS — R10.31 RIGHT GROIN PAIN: Primary | ICD-10-CM

## 2021-12-13 PROCEDURE — 97110 THERAPEUTIC EXERCISES: CPT | Mod: GP | Performed by: PHYSICAL THERAPIST

## 2021-12-13 PROCEDURE — 97140 MANUAL THERAPY 1/> REGIONS: CPT | Mod: GP | Performed by: PHYSICAL THERAPIST

## 2021-12-20 ENCOUNTER — HOSPITAL ENCOUNTER (OUTPATIENT)
Dept: PHYSICAL THERAPY | Facility: REHABILITATION | Age: 71
End: 2021-12-20
Payer: COMMERCIAL

## 2021-12-20 DIAGNOSIS — R10.31 RIGHT GROIN PAIN: Primary | ICD-10-CM

## 2021-12-20 PROCEDURE — 97140 MANUAL THERAPY 1/> REGIONS: CPT | Mod: GP | Performed by: PHYSICAL THERAPIST

## 2021-12-20 PROCEDURE — 97110 THERAPEUTIC EXERCISES: CPT | Mod: GP | Performed by: PHYSICAL THERAPIST

## 2022-01-31 ENCOUNTER — HOSPITAL ENCOUNTER (OUTPATIENT)
Dept: PHYSICAL THERAPY | Facility: REHABILITATION | Age: 72
End: 2022-01-31
Payer: COMMERCIAL

## 2022-01-31 DIAGNOSIS — R10.31 RIGHT GROIN PAIN: Primary | ICD-10-CM

## 2022-01-31 PROCEDURE — 97140 MANUAL THERAPY 1/> REGIONS: CPT | Mod: GP | Performed by: PHYSICAL THERAPIST

## 2022-02-01 ENCOUNTER — OFFICE VISIT (OUTPATIENT)
Dept: FAMILY MEDICINE | Facility: CLINIC | Age: 72
End: 2022-02-01
Payer: COMMERCIAL

## 2022-02-01 VITALS
HEART RATE: 83 BPM | OXYGEN SATURATION: 98 % | SYSTOLIC BLOOD PRESSURE: 158 MMHG | RESPIRATION RATE: 16 BRPM | DIASTOLIC BLOOD PRESSURE: 70 MMHG | WEIGHT: 160.5 LBS | BODY MASS INDEX: 23.7 KG/M2 | TEMPERATURE: 97.6 F

## 2022-02-01 DIAGNOSIS — R10.31 RIGHT GROIN PAIN: Primary | ICD-10-CM

## 2022-02-01 DIAGNOSIS — M25.50 POLYARTHRALGIA: ICD-10-CM

## 2022-02-01 DIAGNOSIS — R03.0 ELEVATED BP WITHOUT DIAGNOSIS OF HYPERTENSION: ICD-10-CM

## 2022-02-01 LAB
C REACTIVE PROTEIN LHE: <0.1 MG/DL (ref 0–0.8)
ERYTHROCYTE [SEDIMENTATION RATE] IN BLOOD BY WESTERGREN METHOD: 10 MM/HR (ref 0–20)

## 2022-02-01 PROCEDURE — 99215 OFFICE O/P EST HI 40 MIN: CPT | Performed by: FAMILY MEDICINE

## 2022-02-01 PROCEDURE — 36415 COLL VENOUS BLD VENIPUNCTURE: CPT | Performed by: FAMILY MEDICINE

## 2022-02-01 PROCEDURE — 86140 C-REACTIVE PROTEIN: CPT | Performed by: FAMILY MEDICINE

## 2022-02-01 PROCEDURE — 85652 RBC SED RATE AUTOMATED: CPT | Performed by: FAMILY MEDICINE

## 2022-02-01 NOTE — PROGRESS NOTES
Assessment & Plan     Right groin pain  Seems to be soft tissue; checked xray today and it seems that confims this  - the hip joints have mild arthritis; await inflammatory results; well might need to do an MRI to evaluate the muscles more.  - XR Pelvis 1/2 Views  - ESR: Erythrocyte sedimentation rate  - CRP, inflammation  - ESR: Erythrocyte sedimentation rate  - CRP, inflammation  Addendum 2/6: Given results of lab and xray - pursuing MRI    Polyarthralgia  Noted, known; managed with acetaminophen  Hip arthritis shows on xray    Elevated BP without diagnosis of hypertension  Will follow - she will check home Bps. Could be due to inadequate sleep, but might be also due to age        40 minutes spent on the date of the encounter doing chart review, history and exam, documentation and further activities per the note    Return in about 3 months (around 5/1/2022) for Follow up.    Chhaya Malone MD  [facility] JASON Mallory is a 71 year old who presents for the following health issues     HPI   Pain in upper right leg - persistent, so she pursued PT which she has been doing since November. Went yesterday and was told it's time to get imaging - there could be something with the right hip - need to rule that out.  Last week Thurs - bad pain  Better Fri-Sat  Sun- OK  Monday - saw PT, did squeezing - did not feel better until 2-3 hrs later    Acetaminophen helps - mostly taken before bed (sometimes twice daily), 5 out of 7 days    On a long car trip, she got very stiff - muscle stiffness all over.    No shoulder area pain with her thigh/groin pain.    Is usually active, but has really cut back on activity due to this pain  Does not consume coffee/tea/caffeine    Sleep 6-7 hrs/night. Goes to sleep at 10 after laying for an hour. Takes amitryptyline 10 about 8:30, rests in bed 9-10 and then goes to sleep.    The worst difficulty is to lift right leg to put sock on    Review of Systems          Objective    BP (!) 158/70   Pulse 83   Temp 97.6  F (36.4  C) (Oral)   Resp 16   Wt 72.8 kg (160 lb 8 oz)   SpO2 98%   BMI 23.70 kg/m    Body mass index is 23.7 kg/m .  Physical Exam   GENERAL: healthy, alert and no distress  MS: muscle bulk and tone in legs appears symmetric and normal; with palpation on the lateral right leg, there might have been a slight bump in a spot just distal to half way between the greater trochanter and the knee. Medially, I did not feel any lumps or swelling.    Results for orders placed or performed in visit on 02/01/22 (from the past 24 hour(s))   ESR: Erythrocyte sedimentation rate   Result Value Ref Range    Erythrocyte Sedimentation Rate 10 0 - 20 mm/hr   CRP, inflammation   Result Value Ref Range    CRP <0.1 0.0-<0.8 mg/dL     No results found for this visit on 02/01/22.

## 2022-02-10 ENCOUNTER — HOSPITAL ENCOUNTER (OUTPATIENT)
Dept: MRI IMAGING | Facility: HOSPITAL | Age: 72
Discharge: HOME | End: 2022-02-10
Attending: FAMILY MEDICINE | Admitting: FAMILY MEDICINE
Payer: COMMERCIAL

## 2022-02-10 DIAGNOSIS — M25.50 POLYARTHRALGIA: ICD-10-CM

## 2022-02-10 DIAGNOSIS — R10.31 RIGHT GROIN PAIN: ICD-10-CM

## 2022-02-10 PROCEDURE — 73718 MRI LOWER EXTREMITY W/O DYE: CPT | Mod: RT

## 2022-02-15 ENCOUNTER — HOSPITAL ENCOUNTER (OUTPATIENT)
Dept: PHYSICAL THERAPY | Facility: REHABILITATION | Age: 72
End: 2022-02-15
Payer: COMMERCIAL

## 2022-02-15 DIAGNOSIS — R10.31 RIGHT GROIN PAIN: Primary | ICD-10-CM

## 2022-02-15 PROCEDURE — 97110 THERAPEUTIC EXERCISES: CPT | Mod: GP | Performed by: PHYSICAL THERAPIST

## 2022-02-15 NOTE — PROGRESS NOTES
[facility] Rehabilitation [facility]    Outpatient Physical Therapy Progress Note  Patient: Mohamud Mnodragon  : 1950    Beginning/End Dates of Reporting Period:  1/15/22 to 2/15/22    Referring Provider: Faisal Shaver MD    Therapy Diagnosis: Right groin pain     Client Self Report: Right groin pain sitting down 3/10. Jennifer sent me to the Dr for an Xray and MRI. Prolonged sitting 7 hrs causes 8-9/10 pain in groin in sitting to [place]- also caused limping. She is otherwise a 5-6/10 directly laying on the right hip. I haven't been doing the exercises- they hurt.    Objective Measurements:  Objective Measure: Imaging  Details: Xray both hips moderate DJD. MRI 2/10/22  Mild impaction of the subchondral bone plate of the superior aspect of the right femoral head. Adjacent marrow edema indicates that this is acute or subacute. There is also moderately large right hip joint effusion, likely secondary to the impaction   Objective Measure: LE strength  Details: Right hip abduction 3-/5 and pt demos significant alteration in mechanics in right SL stand right trunk sway and left hip hike.. Gastroc bilat 4/5  Objective Measure: right Hip extension/quad  Details: WFL - same as left hip       Goals:  Goal Identifier     Goal Description Pt will be independent with her HEP for ongoing symptom management in 12 weeks.   Target Date     Date Met      Progress (detail required for progress note):       Goal Identifier (P) Bending   Goal Description Pt will report no difficulty or pain when bending over or pulling R leg up for lower body dressing in 12 weeks.   Target Date (P) 22   Date Met      Progress (detail required for progress note): (P) The worst pain is when she tries to bend the right hip to get shoes and socks on - 6/10, or when prolonged drive of 7 hours- -7/10     Goal Identifier     Goal Description     Target Date     Date  Met      Progress (detail required for progress note):       Goal Identifier     Goal Description     Target Date     Date Met      Progress (detail required for progress note):       Goal Identifier     Goal Description     Target Date     Date Met      Progress (detail required for progress note):       Goal Identifier     Goal Description     Target Date     Date Met      Progress (detail required for progress note):       Goal Identifier     Goal Description     Target Date     Date Met      Progress (detail required for progress note):       Goal Identifier     Goal Description     Target Date     Date Met      Progress (detail required for progress note):         Plan:  Continue therapy per current plan of care. See pt one more visit to assess change in HEP    Discharge:  No       02/15/22 1607   Signing Clinician's Name / Credentials   Signing clinician's name / credentials Telma Ayala PT   Session Number   Session Number 7/8   Progress Note/Recertification   Recertification Due Date 02/21/22   Adult Goals   PT Ortho Eval Goals 1;2   Ortho Goal 1   Goal Description Pt will be independent with her HEP for ongoing symptom management in 12 weeks.   Ortho Goal 2   Goal Identifier Bending   Goal Description Pt will report no difficulty or pain when bending over or pulling R leg up for lower body dressing in 12 weeks.   Goal Progress The worst pain is when she tries to bend the right hip to get shoes and socks on - 6/10, or when prolonged drive of 7 hours- 6-7/10   Target Date 02/22/22   Subjective Report   Subjective Report Right groin pain sitting down 3/10. Jennifer sent me to the Dr for an Xray and MRI. Prolonged sitting 7 hrs causes 8-9/10 pain in groin in sitting to [place]- also caused limping. She is otherwise a 5-6/10 directly laying on the right hip. I haven't been doing the exercises.   Objective Measures   Objective Measures Objective Measure 1;Objective Measure 2;Objective Measure 3   Objective Measure 1    Objective Measure Imaging   Details Xray both hips moderate DJD. MRI 2/10/22  Mild impaction of the subchondral bone plate of the superior aspect of the right femoral head. Adjacent marrow edema indicates that this is acute or subacute. There is also moderately large right hip joint effusion, likely secondary to the impaction    Objective Measure 2   Objective Measure LE strength   Details Right hip abduction 3-/5 and pt demos significant alteration in mechanics in right SL stand right trunk sway and left hip hike.. Gastroc bilat 4/5   Objective Measure 3   Objective Measure right Hip extension/quad   Details WFL - same as left hip   Treatment Interventions   Interventions Therapeutic Procedure/Exercise   Therapeutic Procedure/exercise   Therapeutic Procedures: strength, endurance, ROM, flexibillity minutes (33149) 55   Skilled Intervention Ther ex   Patient Response Pt was initially very unsure of doing any exercises as she was afraid they were making her pain/condition worse. I told her to stop all exercises for now, and perform only exercises issued today to see if they were painful during or after performance- they were't painful doing them in clinic . Pt told to keep groin pain 0-5/10, and idea was to feel muscle work in target of each exercise   Treatment Detail Pt had some pain with clamshell, standing hip flexor stretch and with butterfly groin stretch. I told her to stop all exercise but bridges. We added prone hip stretch with knee flexion, double heel raises, and seated hip ER with lime band- pt able to do with no increased pain. Also discussed use of pillow to unload hip   Education   Learner Patient   Readiness Acceptance   Method Booklet/handout;Explanation;Demonstration   Response Verbalizes Understanding;Demonstrates Understanding   Plan   Home program RODRIGO with hip flexor stretch/knee flexion, seated hip ER, double heel raises. Bridging, SL Clam-hold, hooklying butterfly stretch- hold, Standing  modified hip flexor stretch-hold   Plan for next session Cont x1 Consider adding SL stand or heel raise to increase Right hip abduction. Then will await result of 2/22 ortho consult   Comments   Comments Pt having increased right groin pain with exercises, so program revised. Imaging shows bilateral moderate hip OA with right hip subchondral impaction and effusion on 2/10/22. She does have moderate right hip abduction strength loss. She will be seeing an orthopedist 2/22/22 at [place].   Total Session Time   Total Treatment Time (sum of timed and untimed services) 55   AMBULATORY CLINICS ONLY-MEDICAL AND TREATMENT DIAGNOSIS   Medical Diagnosis R groin pain   PT Diagnosis R hip pain

## 2022-02-21 ENCOUNTER — HOSPITAL ENCOUNTER (OUTPATIENT)
Dept: PHYSICAL THERAPY | Facility: REHABILITATION | Age: 72
End: 2022-02-21
Payer: COMMERCIAL

## 2022-02-21 DIAGNOSIS — R10.31 RIGHT GROIN PAIN: Primary | ICD-10-CM

## 2022-02-21 PROCEDURE — 97140 MANUAL THERAPY 1/> REGIONS: CPT | Mod: GP | Performed by: PHYSICAL THERAPIST

## 2022-02-21 PROCEDURE — 97110 THERAPEUTIC EXERCISES: CPT | Mod: GP | Performed by: PHYSICAL THERAPIST

## 2022-02-21 PROCEDURE — 97116 GAIT TRAINING THERAPY: CPT | Mod: GP | Performed by: PHYSICAL THERAPIST

## 2022-02-28 ENCOUNTER — TRANSFERRED RECORDS (OUTPATIENT)
Dept: HEALTH INFORMATION MANAGEMENT | Facility: CLINIC | Age: 72
End: 2022-02-28
Payer: COMMERCIAL

## 2022-04-06 ENCOUNTER — OFFICE VISIT (OUTPATIENT)
Dept: FAMILY MEDICINE | Facility: CLINIC | Age: 72
End: 2022-04-06
Payer: COMMERCIAL

## 2022-04-06 VITALS
DIASTOLIC BLOOD PRESSURE: 80 MMHG | BODY MASS INDEX: 23.41 KG/M2 | OXYGEN SATURATION: 96 % | TEMPERATURE: 97.9 F | WEIGHT: 158.5 LBS | SYSTOLIC BLOOD PRESSURE: 122 MMHG | HEART RATE: 92 BPM | RESPIRATION RATE: 16 BRPM

## 2022-04-06 DIAGNOSIS — R94.31 ABNORMAL ELECTROCARDIOGRAM: ICD-10-CM

## 2022-04-06 DIAGNOSIS — M25.551 HIP PAIN, RIGHT: ICD-10-CM

## 2022-04-06 DIAGNOSIS — Z86.59 HX OF MAJOR DEPRESSION: ICD-10-CM

## 2022-04-06 DIAGNOSIS — M17.0 BILATERAL PRIMARY OSTEOARTHRITIS OF KNEE: ICD-10-CM

## 2022-04-06 DIAGNOSIS — Z01.818 PREOP GENERAL PHYSICAL EXAM: Primary | ICD-10-CM

## 2022-04-06 DIAGNOSIS — M81.0 OSTEOPOROSIS WITHOUT CURRENT PATHOLOGICAL FRACTURE, UNSPECIFIED OSTEOPOROSIS TYPE: ICD-10-CM

## 2022-04-06 DIAGNOSIS — G47.00 INSOMNIA, UNSPECIFIED TYPE: ICD-10-CM

## 2022-04-06 DIAGNOSIS — L20.84 INTRINSIC ECZEMA: ICD-10-CM

## 2022-04-06 DIAGNOSIS — R32 URINARY INCONTINENCE, UNSPECIFIED TYPE: ICD-10-CM

## 2022-04-06 LAB
ALBUMIN SERPL-MCNC: 4 G/DL (ref 3.5–5)
ALP SERPL-CCNC: 98 U/L (ref 45–120)
ALT SERPL W P-5'-P-CCNC: 17 U/L (ref 0–45)
ANION GAP SERPL CALCULATED.3IONS-SCNC: 12 MMOL/L (ref 5–18)
AST SERPL W P-5'-P-CCNC: 18 U/L (ref 0–40)
BASOPHILS # BLD AUTO: 0.1 10E3/UL (ref 0–0.2)
BASOPHILS NFR BLD AUTO: 1 %
BILIRUB SERPL-MCNC: 1 MG/DL (ref 0–1)
BUN SERPL-MCNC: 12 MG/DL (ref 8–28)
CALCIUM SERPL-MCNC: 10.5 MG/DL (ref 8.5–10.5)
CHLORIDE BLD-SCNC: 102 MMOL/L (ref 98–107)
CO2 SERPL-SCNC: 26 MMOL/L (ref 22–31)
CREAT SERPL-MCNC: 0.75 MG/DL (ref 0.6–1.1)
EOSINOPHIL # BLD AUTO: 0.1 10E3/UL (ref 0–0.7)
EOSINOPHIL NFR BLD AUTO: 2 %
ERYTHROCYTE [DISTWIDTH] IN BLOOD BY AUTOMATED COUNT: 12.4 % (ref 10–15)
GFR SERPL CREATININE-BSD FRML MDRD: 84 ML/MIN/1.73M2
GLUCOSE BLD-MCNC: 109 MG/DL (ref 70–125)
HCT VFR BLD AUTO: 40.3 % (ref 35–47)
HGB BLD-MCNC: 13.8 G/DL (ref 11.7–15.7)
IMM GRANULOCYTES # BLD: 0 10E3/UL
IMM GRANULOCYTES NFR BLD: 0 %
LYMPHOCYTES # BLD AUTO: 1.4 10E3/UL (ref 0.8–5.3)
LYMPHOCYTES NFR BLD AUTO: 23 %
MCH RBC QN AUTO: 30.9 PG (ref 26.5–33)
MCHC RBC AUTO-ENTMCNC: 34.2 G/DL (ref 31.5–36.5)
MCV RBC AUTO: 90 FL (ref 78–100)
MONOCYTES # BLD AUTO: 0.5 10E3/UL (ref 0–1.3)
MONOCYTES NFR BLD AUTO: 9 %
NEUTROPHILS # BLD AUTO: 3.9 10E3/UL (ref 1.6–8.3)
NEUTROPHILS NFR BLD AUTO: 65 %
PLATELET # BLD AUTO: 222 10E3/UL (ref 150–450)
POTASSIUM BLD-SCNC: 4.7 MMOL/L (ref 3.5–5)
PROT SERPL-MCNC: 7.7 G/DL (ref 6–8)
RBC # BLD AUTO: 4.46 10E6/UL (ref 3.8–5.2)
SODIUM SERPL-SCNC: 140 MMOL/L (ref 136–145)
WBC # BLD AUTO: 6 10E3/UL (ref 4–11)

## 2022-04-06 PROCEDURE — 93010 ELECTROCARDIOGRAM REPORT: CPT | Performed by: INTERNAL MEDICINE

## 2022-04-06 PROCEDURE — 93005 ELECTROCARDIOGRAM TRACING: CPT | Performed by: FAMILY MEDICINE

## 2022-04-06 PROCEDURE — 36415 COLL VENOUS BLD VENIPUNCTURE: CPT | Performed by: FAMILY MEDICINE

## 2022-04-06 PROCEDURE — 85025 COMPLETE CBC W/AUTO DIFF WBC: CPT | Performed by: FAMILY MEDICINE

## 2022-04-06 PROCEDURE — 80053 COMPREHEN METABOLIC PANEL: CPT | Performed by: FAMILY MEDICINE

## 2022-04-06 PROCEDURE — 99215 OFFICE O/P EST HI 40 MIN: CPT | Mod: 25 | Performed by: FAMILY MEDICINE

## 2022-04-06 RX ORDER — GINSENG 100 MG
CAPSULE ORAL
COMMUNITY
End: 2023-11-09

## 2022-04-06 RX ORDER — CHOLECALCIFEROL (VITAMIN D3) 50 MCG
1 TABLET ORAL DAILY
COMMUNITY

## 2022-04-06 NOTE — PROGRESS NOTES
[address]  [place] [address]  Phone: [phone]  Fax: [phone]  Primary Provider: Uvaldo Malone  Pre-op Performing Provider: UVALDO MALONE      PREOPERATIVE EVALUATION:  Today's date: 4/6/2022    Mohamud Mondragon is a 72 year old female who presents for a preoperative evaluation.    Surgical Information:  Surgery/Procedure: hip replacement  Surgery Location: [facility]   Surgeon: Dr Osorio  Surgery Date: 05/05/2022  Time of Surgery: not yet known  Where patient plans to recover: At home with family  Fax number for surgical facility: Note does not need to be faxed, will be available electronically in Epic.    Type of Anesthesia Anticipated: General    Assessment & Plan     The proposed surgical procedure is considered LOW risk.    Preop general physical exam  Mohamud is ready for this surgery. While she's not in great physical shape now because the hip pain has kept her from exercising, she has kept herself in reasonably good shape in the past and I believe that is going to help her a lot to rehabilitate well.  - REVIEW OF HEALTH MAINTENANCE PROTOCOL ORDERS  - CBC with platelets and differential  - Comprehensive metabolic panel (BMP + Alb, Alk Phos, ALT, AST, Total. Bili, TP)  - UA Macro with Reflex to Micro and Culture - lab collect  - EKG 12-lead, tracing only  - XR Chest 2 Views  - CBC with platelets and differential  - Comprehensive metabolic panel (BMP + Alb, Alk Phos, ALT, AST, Total. Bili, TP)    Hip pain, right  Reason for the surgery  - REVIEW OF HEALTH MAINTENANCE PROTOCOL ORDERS  - CBC with platelets and differential  - Comprehensive metabolic panel (BMP + Alb, Alk Phos, ALT, AST, Total. Bili, TP)  - UA Macro with Reflex to Micro and Culture - lab collect  - EKG 12-lead, tracing only  - XR Chest 2 Views  - CBC with platelets and differential  - Comprehensive metabolic panel (BMP + Alb, Alk Phos, ALT, AST, Total. Bili,  TP)    Note: she also has insomnia, eczema, h/o depression, polyarthralgia, osteoporosis, urinary incontinence.    Risks and Recommendations:  The patient has the following additional risks and recommendations for perioperative complications:   - No identified additional risk factors other than previously addressed   - this patient has help at home from her , but he is elderly and not physically conditioned. Please be certain Mohamud can handle going home.    Medication Instructions:  hold all vitamins/supplements for 1 week prior to surgery; ok to take amitriptyline the night before surgery for sleep. take nothing the morning of surgery.    RECOMMENDATION:  APPROVAL GIVEN to proceed with proposed procedure, without further diagnostic evaluation.    40 minutes spent on the date of the encounter doing chart review, history and exam, documentation and further activities per the note            Subjective     HPI related to upcoming procedure: Mohamud cannot move around without pain and she knows her arthritis is severe on the right, thus she's ready for a right hip replacement. While her left hip has moderate arthritis, she thinks she can recover well. She is confident in her 's ability to care for her at home after surgery.    Preop Questions 4/4/2022   1. Have you ever had a heart attack or stroke? No   2. Have you ever had surgery on your heart or blood vessels, such as a stent placement, a coronary artery bypass, or surgery on an artery in your head, neck, heart, or legs? No   3. Do you have chest pain with activity? No   4. Do you have a history of  heart failure? No   5. Do you currently have a cold, bronchitis or symptoms of other infection? No   6. Do you have a cough, shortness of breath, or wheezing? No   7. Do you or anyone in your family have previous history of blood clots? No   8. Do you or does anyone in your family have a serious bleeding problem such as prolonged bleeding following surgeries  or cuts? No   9. Have you ever had problems with anemia or been told to take iron pills? No   10. Have you had any abnormal blood loss such as black, tarry or bloody stools, or abnormal vaginal bleeding? No   11. Have you ever had a blood transfusion? No   12. Are you willing to have a blood transfusion if it is medically needed before, during, or after your surgery? Yes   13. Have you or any of your relatives ever had problems with anesthesia? No   14. Do you have sleep apnea, excessive snoring or daytime drowsiness? No   15. Do you have any artifical heart valves or other implanted medical devices like a pacemaker, defibrillator, or continuous glucose monitor? No   16. Do you have artificial joints? No   17. Are you allergic to latex? No       Health Care Directive:  Patient does not have a Health Care Directive or Living Will: Advance Directive received and scanned. Click on Code in the patient header to view.    Preoperative Review of :   reviewed - no record of controlled substances prescribed.    Review of Systems  CONSTITUTIONAL: NEGATIVE for fever, chills, change in weight  ENT/MOUTH: NEGATIVE for ear, mouth and throat problems  RESP: NEGATIVE for significant cough or SOB  CV: NEGATIVE for chest pain, palpitations or peripheral edema    Patient Active Problem List    Diagnosis Date Noted     Insomnia 10/26/2021     Priority: Medium     Formatting of this note might be different from the original.  Sleep onset/maintenance  Uses prn melatonin    PAST TREATMENTS:   - 11/2009 tried diphenhydramine and counseled on improving sleep hygeine  - 2005: trazodone & Ambien  - 3/2015: advised ok to use melatonin 5-10mg hs; she also sometimes uses Tyl PM  - 3/14/2016 c/o poor sleep, taking melatonin 5mg hs, breath right strip. Advised can try increasing melatonin to 5-10 mg hs. Declines rx for trazodone.  Re-enforced sleep hygeine strategies.     Last Assessment & Plan:   Formatting of this note might be different  "from the original.  - 3/14/2016 c/o poor sleep, taking melatonin 5mg hs, breath right strip. Advised can try increasing melatonin to 5-10 mg hs. Declines rx for trazodone.  Re-enforced sleep hygeine strategies.       Osteoporosis 10/26/2021     Priority: Medium     Formatting of this note might be different from the original.  Osteoporosis dxd based on 8/2013 hip Fracture  - 4/11/14 DEXA Mplwd: T score -1.3 R femur, repeat 1-2yr  - 6/18/14: start alendronate 70mg/wk x5yr; rechk DEXA 1 yr to assess response to therapy  - secondary cause w/u 1/2010 = low vit D (all else neg/wnl)    History to date per PCP, Dr Salcedo:   - 2004 DEXA: T score -1.1 in the hip  - 8/2005 DrAllen: rx FOSAMAX 35 mg/wk  - 4/2006 DrAllen: d/c Fosamax  - 1/2010 DEXA Mplwd: T score -0.8 R femur; sec cuause w/u neg except low Vit D  - 4/24/13 DEXA [facility]: T score -1.2 R femur; REPEAT 1 yr bc done @ wrong location & ?new bone loss  - 8/2013: L hip/elbow Fx w/ fall  - 4/11/14 DEXA Mplwd: T score -1.3 R femur, repeat 1-2yr  - 6/18/14: start alendronate 70mg/wk x5yr; rechk DEXA 1 yr to assess response to therapy  - 3/14/2016 referred for f/u DEXA. Vit D well controlled.  Still taking alendronate, Ca, vit D supplements and getting daily weight bearing exercise  Replacement Utility updated for latest IMO load    Last Assessment & Plan:   Formatting of this note might be different from the original.  - 3/14/2016 referred for f/u DEXA. Vit D well controlled.  Still taking alendronate, Ca, vit D supplements and getting daily weight bearing exercise       Asymptomatic postmenopausal status 03/15/2021     Priority: Medium     Formatting of this note might be different from the original.  c/o symptoms since 2005  - 2005: using OTC product called \"Menopause Balance\"  - 11/2009: FSH 88.9 (post-menopausal); Dr Brooke Knox (, [facility]) started vag Estrace cream & Celexa 20mg  - 3/2010: not tolerating Celexa; changed to sertraline; continue vag " "Estrace cream (helps symptoms per patient, no uterus - no progesterone indicated)  - 7/2013: patient self d/c'd sertraline; symptoms remained stable  - 3/2/15 (labs checked per patient request): FSH88.1 , LH 29.3    Replacement Utility updated for latest IMO load    Last Assessment & Plan:   Formatting of this note might be different from the original.  - 3/2/15 (labs checked per patient request): FSH88.1 , LH 29.3  - 3/11/15 appt: re-assured patient that these results are NORMAL/expected for post-menopausal status. She had requested these labs be checked (although already high FSH 88 when checked 5 yrs ago).  See notes for atrophic vaginitis       Bilateral primary osteoarthritis of knee 07/09/2019     Priority: Medium     Polyarthralgia 07/09/2019     Priority: Medium     Urinary incontinence 03/22/2017     Priority: Medium     Hx of major depression 01/09/2015     Priority: Medium     Formatting of this note might be different from the original.  Seeing [facility] since 2005.   PAST meds/treatments: citalopram 11/2009, light therapy in past (69444 Lux, not helpful), Zoloft 9044-7760.    History to date per PCP, Dr Salcedo:   - 3/2010: changed to sertraline 50mg/day (not tolerating citalopram)  - 1/2012: increased sertraline to 100mg/day, rxd prn clonazepam (never filled)  - 1/2013: PHQ9=7, cont sertraline 100mg/d, consider taper down in summer months if pt interested  - 7/2013: pt d/c'd sertraline, \"mood ok\", f/u prn       Postmenopausal atrophic vaginitis 01/06/2015     Priority: Medium     Formatting of this note might be different from the original.  Symptomatic, started estrace cream 11/2009 by Dr VANNESSA Knox (no progesterone bc no uterus)  - 1/6/2015 renewed rx for estrace outside of appt (has PX2 appt 2/2015)//sds  - 3/11/15 appt: no perimenopausal symptoms, has been using Estrace q1-2 wks since 2009; s/p hysterectomy for benign reasons; advised ok to limit estrace dosing to lowest effective " dosing/frequency to relieve symptoms of vaginal dryness  - 3/14/2016 no perimenopausal symptoms, has been using Estrace q1-2 wks since 2009; s/p hysterectomy for benign reasons; advised ok to limit estrace dosing to lowest effective dosing/frequency to relieve symptoms of vaginal dryness    Last Assessment & Plan:   Formatting of this note might be different from the original.  - 3/14/2016 no perimenopausal symptoms, has been using Estrace q1-2 wks since 2009; s/p hysterectomy for benign reasons; advised ok to limit estrace dosing to lowest effective dosing/frequency to relieve symptoms of vaginal dryness       Foot pain 06/18/2014     Priority: Medium     Formatting of this note might be different from the original.  - 6/18/14: c/o foot pains; requesting to see podiatry to get new orthotics made; [facility] to JOHN Mcdowell at [facility] [place] [facility]\""       Actinic keratosis 02/19/2014     Priority: Medium     Formatting of this note might be different from the original.  - 2/19/14: AK left medial shin 3mm wide, treated w/ liquid nitro x3 cycles  - 6/23/14: small <1mm recurrence vs missed treatment the first time; retreated with liquid nitro x3 cycles; if any recurrence then needs excision biopsy of area//sds  - 3/11/15: shins wnl; now possible small ActKs distal left forearm 1-2 mm, patient monitoring, will RTC prn if changing/worsening for liquid nitrogen treatment  - 3/14/2016: actinic keratosis distal left forearm 8x6mm (treated with liquid nitrogen)    4/2019 - Dr. Amanda Mendoza - dermatology following    Last Assessment & Plan:   Formatting of this note might be different from the original.  - 3/14/2016: actinic keratosis distal left forearm 8x6mm. Informed consent obtained, treated with liquid nitrogen x3 cycles.  Advised RTC for re-check if not fully resolving.       Intrinsic eczema 07/09/2013     Priority: Medium     Formatting of this note might be different from the original.  - Dec 2013 - Jan  2014 Andrew[facility]: Bx x2 unrevealing, eosinophilc/urticarial rxns, rxd topical/po steroids & antihistamines  - 2/4/14 Coni, [facility]: Dxd chronic pruritic rash, unclear etiology, doubt allergic etiology.  Nodular appearance, consider prurigo nodularis.  - 2/26/14: referred to [facility] per pt request/sds         History to date per PCP, Dr Salcedo:   - 7/9/13: after gardening, rash on wrists/legs, ?chigger bites, rxd topical triamcinolone, advised prn diphenhydramine, loratadine//sds  - 12/2/13: treated for scabies with permethrin cream  - 12/7/13 Dr Coleman dxd dermatitis herpetiformis, advised topical steroids, referred to derm  - 12/9/13 [facility]: rxd clobetasol; skin Bx back = dermal edema w/ superficial/deep perivascular lymphocytic inflaations, eosinophils c/w insect bite reaction  - 12/20/13 [facility]: professional home inspection = no bed bugs; rxd clobetasol & po prednisone, hydroxyzine, OTC zyrtec/ranitidine; skin Bx RLQ abd = uritcarial tissue reaction, no malignancy  - 1/24/14 [facility]: improving, f/u prn  - 1/28/14 Patient Portal message: per pt request referred to [facility]  - 2/4/14 [facility]: Dxd chronic pruritic rash, unclear etiology, doubt allergic etiology.  Nodular appearance, consider prurigo nodularis.  - 2/26/14: referred to [facility] per pt request//sds       Sternoclavicular sprain 07/03/2013     Priority: Medium     Formatting of this note might be different from the original.  - 7/3/13: pt recalls falling 2-3 mo ago while doing step exercises, fell against couch impacting R shoulder, little bit sore initially in R clavicle/upper shoulder area but resolved after 3-4 wks.  No pain now 2-3 months later. Normal ROM and strength.  Prominent RIGHT sterno-clavicular step-off, non-tender, not mobile, suggesting severe sprain vs partial dislocation. Advised f/u prn if sxs, declines PT or ortho eval    Replacement Utility updated for latest  "IMO load       Cataract 01/14/2011     Priority: Medium     Formatting of this note might be different from the original.  - 1/14/11 [facility]: bilateral cataracts  Plans to have them removed in Jan 2019       Primary localized osteoarthrosis, ankle and foot 01/01/2005     Priority: Medium     Formatting of this note might be different from the original.  - 6/2010: c/o pain R great toe joint; h/o injury > 5 yrs ago (dropped cookie sheet on R great toe); pain decreased if wearing orthotics (which are 2-3 yrs old); saw DPM July who recommended surgery (pt considering whether she wants surgery)  - 1/2013: pain tolerable, not interested in surgery at this time//sds  - 11/1/13 Michael ([facility]): considering surgery, recovering from hip surgery; notes say f/u 2 mo consider schedule surgery at that time//sds  - 9/3/15 [facility], Dr Magana: R gastrocnemius contracture, R Hallux rigidus; advised daily gastroc stretches, consider surgical correction ('dorsal cheilectomy of 1st MTP joint and gastroc recession\"       Retinal detachment 01/01/2000     Priority: Medium     Formatting of this note might be different from the original.  - 2000 (approx): h/o Spontaneous Bilateral Retinal Detachment, resolved spontaneously. Per patient may have been related to PT treatment for vestibular dysfunction  - 1/14/11 [facility]: Dx postererior vitreous detachment bilaterally; f/u annually or prn worsening visual symptoms         Replacement Utility updated for latest IMO load        Past Medical History:   Diagnosis Date     Allergy to dogs     - 1/2013: no allergy symptoms and not using antihistamines     Chickenpox childhood    h/o chickenpox in childhood per patient report     Closed fracture of head of left radius 08/01/2013    - 8/26/13:  fell on her; L radial head fracture; Dr Engel of [facility] recommended conservative management (also had left hip fracture " s/p surgical repair by Dr Magana of [facility])     Closed fracture of unspecified part of neck of femur 08/01/2013    - 8/26/13 L hip fracture (L proximal femur),  fell on her (also , L radial fracture, conservatively managed); - 8/27/13 Dr Sonu Magana ([facility]), [facility]: ORIF (intramedullary fixation) L hip     H/O colonoscopy 06/01/2016    normal, repeat in 10 years = 2026     H/O echocardiogram 05/01/2007    - 5/4/07 TTE: NORMAL, EF60% (done because of vertigo/dizzy symptoms)        History of hepatitis A vaccination     Completed Hep A series per patient report documented in Dr. Carvalho's notes 8/12/05; patient declines testing for immunity 2/13/10//sds     History of hepatitis B vaccination     Completed Hep B series per patient report documented in Dr. Carvalho's notes 8/12/05; patient declines testing for immunity 2/13/10//sds     History of Holter monitoring 04/01/2009    - 4/1/09 NORMAL 24 hour holter monitor interpreted by Dr. Crouch at [facility] of low back pain 03/01/2013    - 3/16/13 NER low back pain after shoveing snow and kick-boxing video; rxd vicodin and naproxen     Hyperbilirubinemia 01/01/2005    Mild elevation of bilrubin dating back to 2005. Total bili around 1.1. Unclear etiology. 2016&17=1.6ish          Lump or mass in breast 09/01/2007    - 9/20/07 mammo & u/s: ACR2-benign.  U/s = left upper outer quadrant at 1:30 4 cm from nipple demonstrates fibrous ridge; stable, no significant change since 2005; - sister has h/o breast CA        Menarche 14 y/o    Menarche 14 y/o. Regular cycles q 24-26 days, heavy flow 7 days, last period 1987 (then had BARBARA for menorrhagia)     Meniere's disease 11/01/2009    h/o Meniere's Disease. Referred to [facility] by Dr. Knox 11/2009. Seen by [facility] 11/2008. Normal carotid u/s & TTE 5/2007            Menopause mid 50s y/o?    last period 1987 at 48 y/o (BARBARA for menorrhagia), then menopausal symptoms  around 55 y/o     Osteopenia 01/01/2019    no bone density change from 2016-19.     Pneumonia, community acquired 10/01/2007    - 10/2007: LLL Pneumonia (CAP), uncomplicated     Radiological examination, not elsewhere classified 05/01/2007    NORMAL carotid u/s 5/4/07 at [facility]. Done because of symptoms of vertigo/dizziness     Rash 03/01/2019    biopsied by derm - who said could be lupus, but labs did not correlate.     Retinal detachment of both eyes with single break     age 61     Past Surgical History:   Procedure Laterality Date     HYSTERECTOMY       ORIF HIP FRACTURE Left 8/27/13    - 8/27/13 Dr Sonu Magana ([facility]), [facility]: ORIF (intramedullary fixation) L hip for left proximal femur fracture after  fell on her     OTHER SURGICAL HISTORY Left 7/29/03    UT BIOPSY/EXCISION, LYMPH NODE(S)- 7/29/03 Biopsy Left groin Lymph node; Pathology = NEGATIVE/BENIGN. Performed by Dr. Rhodes at [facility]          UT VAGINAL HYSTERECTOMY,UTERUS 250 GMS/<  1987    - 1987 Dr Gabriel Weiner, [facility] @ [place]: Vaginal hysterectomy for menorrhagia; cervix removed.          Current Outpatient Medications   Medication Sig Dispense Refill     amitriptyline (ELAVIL) 10 MG tablet Take 1-2 tablets (10-20 mg) by mouth At Bedtime 180 tablet 4     chlorhexidine (PERIDEX) 0.12 % solution RINSE MOUTH TWICE DAILY STARTING 2 DAYS PRIOR TO APPOINTMENT       clindamycin (CLEOCIN) 300 MG capsule TAKE 1 CAPSULE BY MOUTH EVERY 6 HOURS STARTING 2 DAYS BEFORE APPOINTMENT       clobetasol (TEMOVATE) 0.05 % external ointment        fish oil-omega-3 fatty acids 1000 MG capsule Take 2 g by mouth       melatonin 5 MG tablet Take 1 tablet by mouth       Multiple Vitamins-Minerals (ONCOVITE) TABS Take 1 tablet by mouth       Multiple Vitamins-Minerals (PRESERVISION AREDS 2) CAPS One PO daily 90 capsule 4     Specialty Vitamins Products (BRITTANEY MATRIX 5000) TABS Take 1 tablet by mouth        Turmeric 500 MG CAPS        vitamin  D3 (CHOLECALCIFEROL) 50 mcg (2000 units) tablet Take 1 tablet by mouth daily       zinc 50 MG TABS        carboxymethylcellulose (REFRESH PLUS) 0.5 % SOLN ophthalmic solution  (Patient not taking: Reported on 4/6/2022)         Allergies   Allergen Reactions     Cephalosporins Rash     Other reaction(s): *Unknown     Erythromycin Rash     Rash per patient.  Rash per patient.       Penicillins Rash     Rash per patient.  Rash per patient.          Social History     Tobacco Use     Smoking status: Never Smoker     Smokeless tobacco: Never Used     Tobacco comment: No passive smoke exposure   Substance Use Topics     Alcohol use: No     Family History   Problem Relation Age of Onset     Heart Disease Mother 92.00        mother/father CAD 92-94 y/o     Diabetes Type 2  Mother         mother DM2     Hypertension Mother         mother htn     Heart Disease Father 94.00        mother/father CAD 92-94 y/o     Hyperlipidemia Father         father high lipids, treated with Lipitor     Atrial fibrillation Father         father a fib     Thyroid Disease Father      Breast Cancer Sister 59.00        cured!! type of cancer not known (non-estrogen positive) stage 4     Testicular cancer Brother 30.00        Mohamud's twin     Thyroid Cancer Niece      Mental Illness Son      Ovarian Cancer No family hx of      History   Drug Use No         Objective     /80   Pulse 92   Temp 97.9  F (36.6  C) (Oral)   Resp 16   Wt 71.9 kg (158 lb 8 oz)   SpO2 96%   BMI 23.41 kg/m      Physical Exam    GENERAL APPEARANCE: healthy, alert and no distress     EYES: EOMI, PERRL     HENT: ear canals and TM's normal and nose and mouth without ulcers or lesions     NECK: no adenopathy, no asymmetry, masses, or scars and thyroid normal to palpation     RESP: lungs clear to auscultation - no rales, rhonchi or wheezes     CV: regular rates and rhythm, normal S1 S2, no S3 or S4 and no murmur, click or rub     ABDOMEN:  soft, nontender, no HSM or  masses and bowel sounds normal     MS: extremities normal- no gross deformities noted, no evidence of inflammation in joints, FROM in all extremities.     SKIN: no suspicious lesions or rashes     NEURO: Normal strength and tone, sensory exam grossly normal, mentation intact and speech normal     PSYCH: mentation appears normal. and affect normal/bright     LYMPHATICS: No cervical adenopathy    Recent Labs   Lab Test 10/22/21  0752 03/15/21  1233 08/03/20  0924   HGB 12.9 13.7 14.3    198 179     --  137   POTASSIUM 4.6  --  4.2   CR 0.78  --  0.77        Diagnostics:  Labs pending at this time.  Results will be reviewed when available.   EKG required for baseline only, as last EKG was not normal and not completed in the last 90 days. possible left atrial enlargement and left access deviation. Will check this with an echocardiogram for completeness especially in advance of surgery and physical rehab.    Revised Cardiac Risk Index (RCRI):  The patient has the following serious cardiovascular risks for perioperative complications:   - No serious cardiac risks = 0 points     RCRI Interpretation: 1 point: Class II (low risk - 0.9% complication rate)    Signed Electronically by: Chhaya Malone MD  Copy of this evaluation report is provided to requesting physician.

## 2022-04-07 LAB
ATRIAL RATE - MUSE: 69 BPM
DIASTOLIC BLOOD PRESSURE - MUSE: NORMAL MMHG
INTERPRETATION ECG - MUSE: NORMAL
P AXIS - MUSE: 79 DEGREES
PR INTERVAL - MUSE: 190 MS
QRS DURATION - MUSE: 100 MS
QT - MUSE: 394 MS
QTC - MUSE: 422 MS
R AXIS - MUSE: -34 DEGREES
SYSTOLIC BLOOD PRESSURE - MUSE: NORMAL MMHG
T AXIS - MUSE: 70 DEGREES
VENTRICULAR RATE- MUSE: 69 BPM

## 2022-04-25 ENCOUNTER — LAB (OUTPATIENT)
Dept: LAB | Facility: CLINIC | Age: 72
End: 2022-04-25
Payer: COMMERCIAL

## 2022-04-25 DIAGNOSIS — Z01.818 PREOP GENERAL PHYSICAL EXAM: ICD-10-CM

## 2022-04-25 DIAGNOSIS — M25.551 HIP PAIN, RIGHT: ICD-10-CM

## 2022-04-25 LAB
ALBUMIN UR-MCNC: NEGATIVE MG/DL
APPEARANCE UR: ABNORMAL
BACTERIA #/AREA URNS HPF: ABNORMAL /HPF
BILIRUB UR QL STRIP: NEGATIVE
COLOR UR AUTO: ABNORMAL
GLUCOSE UR STRIP-MCNC: NEGATIVE MG/DL
HGB UR QL STRIP: NEGATIVE
KETONES UR STRIP-MCNC: NEGATIVE MG/DL
LEUKOCYTE ESTERASE UR QL STRIP: ABNORMAL
MUCOUS THREADS #/AREA URNS LPF: PRESENT /LPF
NITRATE UR QL: NEGATIVE
PH UR STRIP: 7.5 [PH] (ref 5–7)
RBC #/AREA URNS AUTO: ABNORMAL /HPF
SP GR UR STRIP: 1.02 (ref 1–1.03)
SQUAMOUS #/AREA URNS AUTO: ABNORMAL /LPF
TRI-PHOS CRY #/AREA URNS HPF: ABNORMAL /HPF
UROBILINOGEN UR STRIP-ACNC: 0.2 E.U./DL
WBC #/AREA URNS AUTO: ABNORMAL /HPF

## 2022-04-25 PROCEDURE — 81001 URINALYSIS AUTO W/SCOPE: CPT

## 2022-04-28 ENCOUNTER — MYC MEDICAL ADVICE (OUTPATIENT)
Dept: FAMILY MEDICINE | Facility: CLINIC | Age: 72
End: 2022-04-28

## 2022-04-28 ENCOUNTER — HOSPITAL ENCOUNTER (OUTPATIENT)
Dept: CARDIOLOGY | Facility: HOSPITAL | Age: 72
Discharge: HOME | End: 2022-04-28
Attending: FAMILY MEDICINE | Admitting: FAMILY MEDICINE
Payer: COMMERCIAL

## 2022-04-28 DIAGNOSIS — Z01.818 PREOP GENERAL PHYSICAL EXAM: ICD-10-CM

## 2022-04-28 DIAGNOSIS — M25.551 HIP PAIN, RIGHT: ICD-10-CM

## 2022-04-28 DIAGNOSIS — R94.31 ABNORMAL ELECTROCARDIOGRAM: ICD-10-CM

## 2022-04-28 LAB — LVEF ECHO: NORMAL

## 2022-04-28 PROCEDURE — 93306 TTE W/DOPPLER COMPLETE: CPT | Mod: 26 | Performed by: INTERNAL MEDICINE

## 2022-04-28 PROCEDURE — 999N000208 ECHOCARDIOGRAM COMPLETE

## 2022-04-28 PROCEDURE — 255N000002 HC RX 255 OP 636: Performed by: FAMILY MEDICINE

## 2022-04-28 RX ADMIN — PERFLUTREN 2 ML: 6.52 INJECTION, SUSPENSION INTRAVENOUS at 14:35

## 2022-04-29 NOTE — TELEPHONE ENCOUNTER
KEY reviewed preoperative office note. Dr. Malone provided surgical medical clearance. I called the patient to remind her of this and she states that we need to fax to Dr. Osorio @ [facility] at fax number [phone]. Pre-operative note faxed as requested.     No action required on part of MD and patient updated.

## 2022-04-29 NOTE — TELEPHONE ENCOUNTER
Patient calls back inquiring status to be clear for surgery on 5/5/22. Writer inform caller message was sent to provider - response pending. Writer will send provider another message. Caller verbalizes understanding.

## 2022-04-29 NOTE — TELEPHONE ENCOUNTER
Patient calls back inquiring about status of request. Writer inform patient message was sent to provider- awaiting for response. Patient ask if MA can physically talk to provider.

## 2022-05-05 ENCOUNTER — TRANSFERRED RECORDS (OUTPATIENT)
Dept: HEALTH INFORMATION MANAGEMENT | Facility: CLINIC | Age: 72
End: 2022-05-05
Payer: COMMERCIAL

## 2022-05-11 ENCOUNTER — OFFICE VISIT (OUTPATIENT)
Dept: FAMILY MEDICINE | Facility: CLINIC | Age: 72
End: 2022-05-11
Payer: COMMERCIAL

## 2022-05-11 VITALS
HEIGHT: 69 IN | WEIGHT: 159 LBS | SYSTOLIC BLOOD PRESSURE: 136 MMHG | DIASTOLIC BLOOD PRESSURE: 70 MMHG | HEART RATE: 81 BPM | OXYGEN SATURATION: 99 % | BODY MASS INDEX: 23.55 KG/M2 | RESPIRATION RATE: 16 BRPM | TEMPERATURE: 98 F

## 2022-05-11 DIAGNOSIS — M16.11 PRIMARY OSTEOARTHRITIS OF RIGHT HIP: ICD-10-CM

## 2022-05-11 DIAGNOSIS — Z01.818 PREOPERATIVE EXAMINATION: Primary | ICD-10-CM

## 2022-05-11 PROCEDURE — 99214 OFFICE O/P EST MOD 30 MIN: CPT | Performed by: FAMILY MEDICINE

## 2022-05-11 NOTE — PROGRESS NOTES
[address]  [place] [address]  Phone: [phone]  Fax: [phone]  Primary Provider: Chhaya Malone  Pre-op Performing Provider: MARIA LUISA NICHOLAS      PREOPERATIVE EVALUATION:  Today's date: 5/11/2022    Mohamud Mondragon is a 72 year old female who presents for a preoperative evaluation.    Surgical Information:  Surgery/Procedure: Total Right Hip Replacement   Surgery Location: [facility]   Surgeon: Dr. Osorio   Surgery Date: 5/19/2022  Time of Surgery: TBD   Where patient plans to recover: At home with family  Fax number for surgical facility:     Type of Anesthesia Anticipated: General    Assessment & Plan     The proposed surgical procedure is considered INTERMEDIATE risk.    Preoperative examination      Primary osteoarthritis of right hip              Medication Instructions:  No medications for 1 week prior to surgery.    RECOMMENDATION:  APPROVAL GIVEN to proceed with proposed procedure, without further diagnostic evaluation.                  Subjective     HPI related to upcoming procedure: Right hip arthritis.  Decreased mobility.  Normal echocardiogram in April.    Preop Questions 5/7/2022   1. Have you ever had a heart attack or stroke? No   2. Have you ever had surgery on your heart or blood vessels, such as a stent placement, a coronary artery bypass, or surgery on an artery in your head, neck, heart, or legs? No   3. Do you have chest pain with activity? No   4. Do you have a history of  heart failure? No   5. Do you currently have a cold, bronchitis or symptoms of other infection? No   6. Do you have a cough, shortness of breath, or wheezing? No   7. Do you or anyone in your family have previous history of blood clots? No   8. Do you or does anyone in your family have a serious bleeding problem such as prolonged bleeding following surgeries or cuts? No   9. Have you ever had problems with anemia or been told to take iron  pills? No   10. Have you had any abnormal blood loss such as black, tarry or bloody stools, or abnormal vaginal bleeding? No   11. Have you ever had a blood transfusion? No   12. Are you willing to have a blood transfusion if it is medically needed before, during, or after your surgery? Yes   13. Have you or any of your relatives ever had problems with anesthesia? No   14. Do you have sleep apnea, excessive snoring or daytime drowsiness? No   15. Do you have any artifical heart valves or other implanted medical devices like a pacemaker, defibrillator, or continuous glucose monitor? No   16. Do you have artificial joints? Has a beba in left hip   17. Are you allergic to latex? No           Preoperative Review of :   reviewed - Oxycodone was filled in April for post-op           Review of Systems  CONSTITUTIONAL: NEGATIVE for fever, chills, change in weight  INTEGUMENTARY/SKIN: NEGATIVE for worrisome rashes, moles or lesions  EYES: NEGATIVE for vision changes or irritation  ENT/MOUTH: NEGATIVE for ear, mouth and throat problems  RESP: NEGATIVE for significant cough or SOB  CV: NEGATIVE for chest pain, palpitations or peripheral edema  GI: NEGATIVE for nausea, abdominal pain, heartburn, or change in bowel habits  : NEGATIVE for frequency, dysuria, or hematuria  MUSCULOSKELETAL: NEGATIVE for significant arthralgias or myalgia  NEURO: NEGATIVE for weakness, dizziness or paresthesias  ENDOCRINE: NEGATIVE for temperature intolerance, skin/hair changes  HEME: NEGATIVE for bleeding problems  PSYCHIATRIC: NEGATIVE for changes in mood or affect    Patient Active Problem List    Diagnosis Date Noted     Insomnia 10/26/2021     Priority: Medium     Formatting of this note might be different from the original.  Sleep onset/maintenance  Uses prn melatonin    PAST TREATMENTS:   - 11/2009 tried diphenhydramine and counseled on improving sleep hygeine  - 2005: trazodone & Ambien  - 3/2015: advised ok to use melatonin 5-10mg  hs; she also sometimes uses Tyl PM  - 3/14/2016 c/o poor sleep, taking melatonin 5mg hs, breath right strip. Advised can try increasing melatonin to 5-10 mg hs. Declines rx for trazodone.  Re-enforced sleep hygeine strategies.     Last Assessment & Plan:   Formatting of this note might be different from the original.  - 3/14/2016 c/o poor sleep, taking melatonin 5mg hs, breath right strip. Advised can try increasing melatonin to 5-10 mg hs. Declines rx for trazodone.  Re-enforced sleep hygeine strategies.       Osteoporosis 10/26/2021     Priority: Medium     Formatting of this note might be different from the original.  Osteoporosis dxd based on 8/2013 hip Fracture  - 4/11/14 DEXA Mplwd: T score -1.3 R femur, repeat 1-2yr  - 6/18/14: start alendronate 70mg/wk x5yr; rechk DEXA 1 yr to assess response to therapy  - secondary cause w/u 1/2010 = low vit D (all else neg/wnl)    History to date per PCP, Dr Salcedo:   - 2004 DEXA: T score -1.1 in the hip  - 8/2005 [facility]: rx FOSAMAX 35 mg/wk  - 4/2006 [facility]: d/c Fosamax  - 1/2010 DEXA Mplwd: T score -0.8 R femur; sec cuause w/u neg except low Vit D  - 4/24/13 DEXA [facility]: T score -1.2 R femur; REPEAT 1 yr bc done @ wrong location & ?new bone loss  - 8/2013: L hip/elbow Fx w/ fall  - 4/11/14 DEXA Mplwd: T score -1.3 R femur, repeat 1-2yr  - 6/18/14: start alendronate 70mg/wk x5yr; rechk DEXA 1 yr to assess response to therapy  - 3/14/2016 referred for f/u DEXA. Vit D well controlled.  Still taking alendronate, Ca, vit D supplements and getting daily weight bearing exercise  Replacement Utility updated for latest IMO load    Last Assessment & Plan:   Formatting of this note might be different from the original.  - 3/14/2016 referred for f/u DEXA. Vit D well controlled.  Still taking alendronate, Ca, vit D supplements and getting daily weight bearing exercise       Asymptomatic postmenopausal status 03/15/2021     Priority: Medium     Formatting of this note might be  "different from the original.  c/o symptoms since 2005  - 2005: using OTC product called \"Menopause Balance\"  - 11/2009: FSH 88.9 (post-menopausal); Dr Brooke Knox (, [facility]) started vag Estrace cream & Celexa 20mg  - 3/2010: not tolerating Celexa; changed to sertraline; continue vag Estrace cream (helps symptoms per patient, no uterus - no progesterone indicated)  - 7/2013: patient self d/c'd sertraline; symptoms remained stable  - 3/2/15 (labs checked per patient request): FSH88.1 , LH 29.3    Replacement Utility updated for latest IMO load    Last Assessment & Plan:   Formatting of this note might be different from the original.  - 3/2/15 (labs checked per patient request): FSH88.1 , LH 29.3  - 3/11/15 appt: re-assured patient that these results are NORMAL/expected for post-menopausal status. She had requested these labs be checked (although already high FSH 88 when checked 5 yrs ago).  See notes for atrophic vaginitis       Bilateral primary osteoarthritis of knee 07/09/2019     Priority: Medium     Polyarthralgia 07/09/2019     Priority: Medium     Urinary incontinence 03/22/2017     Priority: Medium     Hx of major depression 01/09/2015     Priority: Medium     Formatting of this note might be different from the original.  Seeing [facility] since 2005.   PAST meds/treatments: citalopram 11/2009, light therapy in past (03541 Lux, not helpful), Zoloft 4399-0832.    History to date per PCP, Dr Salcedo:   - 3/2010: changed to sertraline 50mg/day (not tolerating citalopram)  - 1/2012: increased sertraline to 100mg/day, rxd prn clonazepam (never filled)  - 1/2013: PHQ9=7, cont sertraline 100mg/d, consider taper down in summer months if pt interested  - 7/2013: pt d/c'd sertraline, \"mood ok\", f/u prn       Postmenopausal atrophic vaginitis 01/06/2015     Priority: Medium     Formatting of this note might be different from the original.  Symptomatic, started estrace cream 11/2009 by Dr VANNESSA Knox " (no progesterone bc no uterus)  - 1/6/2015 renewed rx for estrace outside of appt (has PX2 appt 2/2015)//sds  - 3/11/15 appt: no perimenopausal symptoms, has been using Estrace q1-2 wks since 2009; s/p hysterectomy for benign reasons; advised ok to limit estrace dosing to lowest effective dosing/frequency to relieve symptoms of vaginal dryness  - 3/14/2016 no perimenopausal symptoms, has been using Estrace q1-2 wks since 2009; s/p hysterectomy for benign reasons; advised ok to limit estrace dosing to lowest effective dosing/frequency to relieve symptoms of vaginal dryness    Last Assessment & Plan:   Formatting of this note might be different from the original.  - 3/14/2016 no perimenopausal symptoms, has been using Estrace q1-2 wks since 2009; s/p hysterectomy for benign reasons; advised ok to limit estrace dosing to lowest effective dosing/frequency to relieve symptoms of vaginal dryness       Foot pain 06/18/2014     Priority: Medium     Formatting of this note might be different from the original.  - 6/18/14: c/o foot pains; requesting to see podiatry to get new orthotics made; referred to JOHN Mcdowell at [facility]//ambika       Actinic keratosis 02/19/2014     Priority: Medium     Formatting of this note might be different from the original.  - 2/19/14: AK left medial shin 3mm wide, treated w/ liquid nitro x3 cycles  - 6/23/14: small <1mm recurrence vs missed treatment the first time; retreated with liquid nitro x3 cycles; if any recurrence then needs excision biopsy of area//sds  - 3/11/15: shins wnl; now possible small ActKs distal left forearm 1-2 mm, patient monitoring, will RTC prn if changing/worsening for liquid nitrogen treatment  - 3/14/2016: actinic keratosis distal left forearm 8x6mm (treated with liquid nitrogen)    4/2019 - Dr. Amanda Mendoza - dermatology following    Last Assessment & Plan:   Formatting of this note might be different from the original.  - 3/14/2016: actinic keratosis  distal left forearm 8x6mm. Informed consent obtained, treated with liquid nitrogen x3 cycles.  Advised RTC for re-check if not fully resolving.       Intrinsic eczema 07/09/2013     Priority: Medium     Formatting of this note might be different from the original.  - Dec 2013 - Jan 2014 [facility]: Bx x2 unrevealing, eosinophilc/urticarial rxns, rxd topical/po steroids & antihistamines  - 2/4/14 [facility]: Dxd chronic pruritic rash, unclear etiology, doubt allergic etiology.  Nodular appearance, consider prurigo nodularis.  - 2/26/14: referred to [facility] per pt request/sds         History to date per PCP, Dr Salcedo:   - 7/9/13: after gardening, rash on wrists/legs, ?chigger bites, rxd topical triamcinolone, advised prn diphenhydramine, loratadine//sds  - 12/2/13: treated for scabies with permethrin cream  - 12/7/13 Dr Coleman dxd dermatitis herpetiformis, advised topical steroids, referred to derm  - 12/9/13 [facility]: rxd clobetasol; skin Bx back = dermal edema w/ superficial/deep perivascular lymphocytic inflaations, eosinophils c/w insect bite reaction  - 12/20/13 [facility]: professional home inspection = no bed bugs; rxd clobetasol & po prednisone, hydroxyzine, OTC zyrtec/ranitidine; skin Bx RLQ abd = uritcarial tissue reaction, no malignancy  - 1/24/14 [facility]: improving, f/u prn  - 1/28/14 Patient Portal message: per pt request referred to [facility]  - 2/4/14 [facility]: Dxd chronic pruritic rash, unclear etiology, doubt allergic etiology.  Nodular appearance, consider prurigo nodularis.  - 2/26/14: referred to [facility] per pt request//sds       Sternoclavicular sprain 07/03/2013     Priority: Medium     Formatting of this note might be different from the  "original.  - 7/3/13: pt recalls falling 2-3 mo ago while doing step exercises, fell against couch impacting R shoulder, little bit sore initially in R clavicle/upper shoulder area but resolved after 3-4 wks.  No pain now 2-3 months later. Normal ROM and strength.  Prominent RIGHT sterno-clavicular step-off, non-tender, not mobile, suggesting severe sprain vs partial dislocation. Advised f/u prn if sxs, declines PT or ortho eval    Replacement Utility updated for latest IMO load       Cataract 01/14/2011     Priority: Medium     Formatting of this note might be different from the original.  - 1/14/11 [facility]: bilateral cataracts  Plans to have them removed in Jan 2019       Primary localized osteoarthrosis, ankle and foot 01/01/2005     Priority: Medium     Formatting of this note might be different from the original.  - 6/2010: c/o pain R great toe joint; h/o injury > 5 yrs ago (dropped cookie sheet on R great toe); pain decreased if wearing orthotics (which are 2-3 yrs old); saw JOHN Mcdowell who recommended surgery (pt considering whether she wants surgery)  - 1/2013: pain tolerable, not interested in surgery at this time//sds  - 11/1/13 Michael ([facility]): considering surgery, recovering from hip surgery; notes say f/u 2 mo consider schedule surgery at that time//sds  - 9/3/15 [facility], Dr Magana: R gastrocnemius contracture, R Hallux rigidus; advised daily gastroc stretches, consider surgical correction ('dorsal cheilectomy of 1st MTP joint and gastroc recession\"       Retinal detachment 01/01/2000     Priority: Medium     Formatting of this note might be different from the original.  - 2000 (approx): h/o Spontaneous Bilateral Retinal Detachment, resolved spontaneously. Per patient may have been related to PT treatment for vestibular dysfunction  - 1/14/11 [facility]: Dx postererior vitreous detachment bilaterally; f/u annually or prn worsening visual symptoms         Replacement " Utility updated for latest IMO load        Past Medical History:   Diagnosis Date     Allergy to dogs     - 1/2013: no allergy symptoms and not using antihistamines     Chickenpox childhood    h/o chickenpox in childhood per patient report     Closed fracture of head of left radius 08/01/2013    - 8/26/13:  fell on her; L radial head fracture; Dr Engel of [facility] recommended conservative management (also had left hip fracture s/p surgical repair by Dr Magana of [facility])     Closed fracture of unspecified part of neck of femur 08/01/2013    - 8/26/13 L hip fracture (L proximal femur),  fell on her (also , L radial fracture, conservatively managed); - 8/27/13 Dr Sonu Magana ([facility]), [facility]: ORIF (intramedullary fixation) L hip     H/O colonoscopy 06/01/2016    normal, repeat in 10 years = 2026     H/O echocardiogram 05/01/2007    - 5/4/07 TTE: NORMAL, EF60% (done because of vertigo/dizzy symptoms)        History of hepatitis A vaccination     Completed Hep A series per patient report documented in Dr. Carvalho's notes 8/12/05; patient declines testing for immunity 2/13/10//sds     History of hepatitis B vaccination     Completed Hep B series per patient report documented in Dr. Carvalho's notes 8/12/05; patient declines testing for immunity 2/13/10//sds     History of Holter monitoring 04/01/2009    - 4/1/09 NORMAL 24 hour holter monitor interpreted by Dr. Crouch at [facility] of low back pain 03/01/2013    - 3/16/13 [facility]ER low back pain after shoveing snow and kick-boxing video; rxd vicodin and naproxen     Hyperbilirubinemia 01/01/2005    Mild elevation of bilrubin dating back to 2005. Total bili around 1.1. Unclear etiology. 2016&17=1.6ish          Lump or mass in breast 09/01/2007    - 9/20/07 mammo & u/s: ACR2-benign.  U/s = left upper outer quadrant at 1:30 4 cm from nipple demonstrates fibrous ridge; stable, no significant change since 2005; - sister has h/o breast CA         Menarche 14 y/o    Menarche 14 y/o. Regular cycles q 24-26 days, heavy flow 7 days, last period 1987 (then had BARBARA for menorrhagia)     Meniere's disease 11/01/2009    h/o Meniere's Disease. Referred to [facility] by Dr. Knox 11/2009. Seen by [facility] 11/2008. Normal carotid u/s & TTE 5/2007            Menopause mid 50s y/o?    last period 1987 at 46 y/o (BARBARA for menorrhagia), then menopausal symptoms around 53 y/o     Osteopenia 01/01/2019    no bone density change from 2016-19.     Pneumonia, community acquired 10/01/2007    - 10/2007: [facility] Pneumonia (CAP), uncomplicated     Radiological examination, not elsewhere classified 05/01/2007    NORMAL carotid u/s 5/4/07 at [facility]. Done because of symptoms of vertigo/dizziness     Rash 03/01/2019    biopsied by derm - who said could be lupus, but labs did not correlate.     Retinal detachment of both eyes with single break     age 61     Past Surgical History:   Procedure Laterality Date     HYSTERECTOMY       ORIF HIP FRACTURE Left 8/27/13    - 8/27/13 Dr Sonu Magana ([facility]), [facility]: ORIF (intramedullary fixation) L hip for left proximal femur fracture after  fell on her     OTHER SURGICAL HISTORY Left 7/29/03    CT BIOPSY/EXCISION, LYMPH NODE(S)- 7/29/03 Biopsy Left groin Lymph node; Pathology = NEGATIVE/BENIGN. Performed by Dr[facility] Rhodes at [facility]          CT VAGINAL HYSTERECTOMY,UTERUS 250 GMS/<  1987    - 1987 Dr Gabriel Weiner, [facility] [place]: Vaginal hysterectomy for menorrhagia; cervix removed.          Current Outpatient Medications   Medication Sig Dispense Refill     amitriptyline (ELAVIL) 10 MG tablet Take 1-2 tablets (10-20 mg) by mouth At Bedtime 180 tablet 4     carboxymethylcellulose (REFRESH PLUS) 0.5 % SOLN ophthalmic solution        clobetasol (TEMOVATE) 0.05 % external ointment        fish oil-omega-3 fatty acids 1000 MG capsule Take 2 g by mouth       melatonin 5 MG tablet Take 1 tablet  "by mouth       Multiple Vitamins-Minerals (PRESERVISION AREDS 2) CAPS One PO daily 90 capsule 4     Turmeric 500 MG CAPS        vitamin D3 (CHOLECALCIFEROL) 50 mcg (2000 units) tablet Take 1 tablet by mouth daily       zinc 50 MG TABS          Allergies   Allergen Reactions     Cephalosporins Rash     Other reaction(s): *Unknown     Erythromycin Rash     Rash per patient.  Rash per patient.       Penicillins Rash     Rash per patient.  Rash per patient.          Social History     Tobacco Use     Smoking status: Never Smoker     Smokeless tobacco: Never Used     Tobacco comment: No passive smoke exposure   Substance Use Topics     Alcohol use: No       History   Drug Use No         Objective     /70   Pulse 81   Temp 98  F (36.7  C) (Oral)   Resp 16   Ht 1.753 m (5' 9\")   Wt 72.1 kg (159 lb)   SpO2 99%   BMI 23.48 kg/m      Physical Exam  Eyes: EOM full, pupils normal, conjunctivae normal  Ears: TM's and canals normal  Oropharynx: normal  Neck: supple without adenopathy or thyromegaly  Lungs: normal  Heart: regular rhythm, normal rate, no murmur  Abdomen: no HSM, mass or tenderness  Extremities: FROM, normal strength and sensation      Recent Labs   Lab Test 04/06/22  1321 04/06/22  1320 10/22/21  0752   HGB 13.8  --  12.9     --  197   NA  --  140 137   POTASSIUM  --  4.7 4.6   CR  --  0.75 0.78        Diagnostics:  No labs were ordered during this visit.   No EKG this visit, completed in the last 90 days.    Revised Cardiac Risk Index (RCRI):  The patient has the following serious cardiovascular risks for perioperative complications:   - No serious cardiac risks = 0 points     RCRI Interpretation: 0 points: Class I (very low risk - 0.4% complication rate)           Signed Electronically by: Gabriel Cabrales MD, MD  Copy of this evaluation report is provided to requesting physician.      "

## 2022-05-18 ENCOUNTER — LAB REQUISITION (OUTPATIENT)
Dept: LAB | Facility: HOSPITAL | Age: 72
End: 2022-05-18
Payer: COMMERCIAL

## 2022-05-18 DIAGNOSIS — Z01.818 ENCOUNTER FOR OTHER PREPROCEDURAL EXAMINATION: ICD-10-CM

## 2022-05-18 DIAGNOSIS — Z00.00 ENCOUNTER FOR GENERAL ADULT MEDICAL EXAMINATION WITHOUT ABNORMAL FINDINGS: ICD-10-CM

## 2022-05-18 LAB
ANION GAP SERPL CALCULATED.3IONS-SCNC: 7 MMOL/L (ref 5–18)
BUN SERPL-MCNC: 15 MG/DL (ref 8–28)
CALCIUM SERPL-MCNC: 9.6 MG/DL (ref 8.5–10.5)
CHLORIDE BLD-SCNC: 103 MMOL/L (ref 98–107)
CO2 SERPL-SCNC: 28 MMOL/L (ref 22–31)
CREAT SERPL-MCNC: 0.77 MG/DL (ref 0.6–1.1)
ERYTHROCYTE [DISTWIDTH] IN BLOOD BY AUTOMATED COUNT: 12.4 % (ref 10–15)
GFR SERPL CREATININE-BSD FRML MDRD: 81 ML/MIN/1.73M2
GLUCOSE BLD-MCNC: 95 MG/DL (ref 70–125)
HCT VFR BLD AUTO: 39 % (ref 35–47)
HGB BLD-MCNC: 13 G/DL (ref 11.7–15.7)
MCH RBC QN AUTO: 30.7 PG (ref 26.5–33)
MCHC RBC AUTO-ENTMCNC: 33.3 G/DL (ref 31.5–36.5)
MCV RBC AUTO: 92 FL (ref 78–100)
PLATELET # BLD AUTO: 203 10E3/UL (ref 150–450)
POTASSIUM BLD-SCNC: 3.9 MMOL/L (ref 3.5–5)
RBC # BLD AUTO: 4.24 10E6/UL (ref 3.8–5.2)
SODIUM SERPL-SCNC: 138 MMOL/L (ref 136–145)
WBC # BLD AUTO: 5.6 10E3/UL (ref 4–11)

## 2022-05-18 PROCEDURE — 80048 BASIC METABOLIC PNL TOTAL CA: CPT | Mod: ORL | Performed by: NURSE PRACTITIONER

## 2022-05-18 PROCEDURE — 36415 COLL VENOUS BLD VENIPUNCTURE: CPT | Mod: ORL | Performed by: NURSE PRACTITIONER

## 2022-05-18 PROCEDURE — 85027 COMPLETE CBC AUTOMATED: CPT | Mod: ORL | Performed by: NURSE PRACTITIONER

## 2022-05-19 ENCOUNTER — TRANSFERRED RECORDS (OUTPATIENT)
Dept: HEALTH INFORMATION MANAGEMENT | Facility: CLINIC | Age: 72
End: 2022-05-19
Payer: COMMERCIAL

## 2022-05-31 ENCOUNTER — TRANSFERRED RECORDS (OUTPATIENT)
Dept: HEALTH INFORMATION MANAGEMENT | Facility: CLINIC | Age: 72
End: 2022-05-31
Payer: COMMERCIAL

## 2022-06-01 NOTE — ADDENDUM NOTE
Encounter addended by: Telma Ayala, PT on: 6/1/2022 2:58 PM   Actions taken: Clinical Note Signed, Flowsheet accepted, Episode resolved
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
## 2022-06-09 ENCOUNTER — MYC MEDICAL ADVICE (OUTPATIENT)
Dept: FAMILY MEDICINE | Facility: CLINIC | Age: 72
End: 2022-06-09
Payer: COMMERCIAL

## 2022-06-09 DIAGNOSIS — M16.11 PRIMARY OSTEOARTHRITIS OF RIGHT HIP: Primary | ICD-10-CM

## 2022-06-09 DIAGNOSIS — G47.00 INSOMNIA, UNSPECIFIED TYPE: ICD-10-CM

## 2022-06-09 RX ORDER — AMITRIPTYLINE HYDROCHLORIDE 10 MG/1
10-30 TABLET ORAL AT BEDTIME
Qty: 90 TABLET | Refills: 3 | Status: SHIPPED | OUTPATIENT
Start: 2022-06-09 | End: 2022-11-01 | Stop reason: DRUGHIGH

## 2022-06-27 ENCOUNTER — TRANSFERRED RECORDS (OUTPATIENT)
Dept: HEALTH INFORMATION MANAGEMENT | Facility: CLINIC | Age: 72
End: 2022-06-27

## 2022-07-12 ENCOUNTER — OFFICE VISIT (OUTPATIENT)
Dept: FAMILY MEDICINE | Facility: CLINIC | Age: 72
End: 2022-07-12
Payer: COMMERCIAL

## 2022-07-12 VITALS
DIASTOLIC BLOOD PRESSURE: 72 MMHG | HEART RATE: 82 BPM | SYSTOLIC BLOOD PRESSURE: 136 MMHG | RESPIRATION RATE: 16 BRPM | TEMPERATURE: 98 F | OXYGEN SATURATION: 98 % | BODY MASS INDEX: 23.41 KG/M2 | WEIGHT: 158.5 LBS

## 2022-07-12 DIAGNOSIS — G47.39 OTHER SLEEP APNEA: ICD-10-CM
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
## 2022-07-12 DIAGNOSIS — S70.261D TICK BITE OF RIGHT HIP, SUBSEQUENT ENCOUNTER: ICD-10-CM

## 2022-07-12 DIAGNOSIS — L03.818 CELLULITIS OF OTHER SPECIFIED SITE: Primary | ICD-10-CM

## 2022-07-12 PROCEDURE — 99215 OFFICE O/P EST HI 40 MIN: CPT | Performed by: FAMILY MEDICINE

## 2022-07-12 RX ORDER — DOXYCYCLINE HYCLATE 100 MG
100 TABLET ORAL 2 TIMES DAILY
Qty: 28 TABLET | Refills: 0 | Status: SHIPPED | OUTPATIENT
Start: 2022-07-12 | End: 2022-11-01

## 2022-07-12 RX ORDER — CELECOXIB 200 MG/1
CAPSULE ORAL
COMMUNITY
Start: 2022-05-20 | End: 2022-11-01

## 2022-07-12 NOTE — PROGRESS NOTES
Assessment & Plan     Cellulitis of other specified site  She is currently on cephalexin which is a reasonable choice since it is thought to be a skin infection. She will complete this x 1 week. If the rash if resolving at that time, she will stop antibiotics. If the rash if not resolving or if it seems to recur/return, then she will fill the prescription for doxycycline and take that x 2 weeks (which can treat the cellulitis but also Lyme). She is aware that I am out of the office for the next week but that another doctor can help her prn.  - doxycycline hyclate (VIBRA-TABS) 100 MG tablet  Dispense: 28 tablet; Refill: 0    Tick bite of right hip, subsequent encounter  Not likely Lyme per patient report of what the tick size was and what the urgency room doctor said.    Other sleep apnea  She is really struggling with sleep in the recliner since her hip surgery. This is not a good situation as she needs sleep to heal. Her exercise should be helping more. She declines a medication. She will work on relaxing and trying to avoid thoughts about needing to urinate. She'll be in touch if she needs a med.      Review of external notes as documented elsewhere in note  Ordering of each unique test  Prescription drug management  45 minutes spent on the date of the encounter doing chart review, history and exam, documentation and further activities per the note    Return if symptoms worsen or fail to improve.    Chhaya Malone MD  [facility] JASON Mallory is a 72 year old, presenting for the following health issues:  tick bite    History of Present Illness       Reason for visit:  Tick blte    She eats 4 or more servings of fruits and vegetables daily.She consumes 0 sweetened beverage(s) daily.She exercises with enough effort to increase her heart rate 30 to 60 minutes per day.  She exercises with enough effort to increase her heart rate 7 days per week.   She is taking medications  regularly.     Tick bite 2 days ago, just below the right buttocks on the upper inner right thigh. She was seen in urgent care yesterday and put on cefalexin for it. She wants to know if it is OK or getting worse. There is a red streak to the front of her right groin. She just started the cefalexin and is taking it 4 times daily.    Sleep - really frustrated how she has to go to the bathroom so often. Thinking it is just restlessness. Up 2-3 times per night. Urinates each time just to be sure she's empty. Walks a bit and then self-sooths.  Was doing Kegels before surgery.   Sleep   No energy, always tired  Appetite ok    Celebrated 50th wedding anniversary. That was really nice.    Still walking 1-2 miles per day.    Review of Systems         Objective    /72   Pulse 82   Temp 98  F (36.7  C) (Temporal)   Resp 16   Wt 71.9 kg (158 lb 8 oz)   SpO2 98%   BMI 23.41 kg/m    Body mass index is 23.41 kg/m .  Physical Exam   GENERAL: healthy, alert and no distress  MS: no gross musculoskeletal defects noted, no edema  SKIN: when she is lying down with her legs bent, there is a deep red spot just to the right and lower than the vaginal opening. In the center of the red spot, which I approximate as being 5-6cm in diameter, there is a tiny brown linear speck. When I look at it with the otoscope, I see the brown and a tiny bit of surrounding edema. I was able to get the brown speck out by removing the epidermis in the center (only an area about 1x1mm) and there was no bleeding. I covered it with bacitracin and a bandaid. I saw the brighter red line extending from the deep red spot to the anterior groin, but I did not feel an enlarged lymph node there.  Surgical scar from hip replacement seen on the right lateral thigh.  Mobility: patient moved around for the exam seemingly easily.                .  ..

## 2022-08-24 ENCOUNTER — TELEPHONE (OUTPATIENT)
Dept: FAMILY MEDICINE | Facility: CLINIC | Age: 72
End: 2022-08-24

## 2022-08-24 NOTE — TELEPHONE ENCOUNTER
General Call    Contacts       Type Contact Phone/Fax    08/24/2022 09:02 AM CDT Phone (Incoming) Mohamud Mondragon (Self) [phone] (H)        Reason for Call:  Pt calling to see if has had shingles shots.  Pt had the one step back in 2015 and the two step in April and August of 2018.      What are your questions or concerns:  Wanting to know if she had the shingles injections    Date of last appointment with provider: 7/12/22    Call taken 08/24/22 at 9 am by KEY Haley

## 2022-09-07 ENCOUNTER — TRANSFERRED RECORDS (OUTPATIENT)
Dept: HEALTH INFORMATION MANAGEMENT | Facility: CLINIC | Age: 72
End: 2022-09-07

## 2022-09-25 ENCOUNTER — HEALTH MAINTENANCE LETTER (OUTPATIENT)
Age: 72
End: 2022-09-25

## 2022-10-03 NOTE — PROGRESS NOTES
Subjective:      Patient ID: Mohamud Mondragon is a 67 y.o. female.    Chief Complaint:    HPI Mohamud Mondragon is a 67 y.o. female who presents today complaining of itchy rash on her arms and stomach ×3 days. She was with her family at a cabin and she had to clear some brush. Her rash was itchy, but then is began to blister and sting. She has been putting Cortisone cream on the rash for pain control, but it is not working well. There is surrounding redness around the blistered area that is spreading in size over the past few days. She denies any sick symptoms.       Past Medical History:   Diagnosis Date     Allergy to dogs     - 1/2013: no allergy symptoms and not using antihistamines     CAROTID U/S-doppler wnl 5/2007    NORMAL carotid u/s 5/4/07 at [place]. Done because of symptoms of vertigo/dizziness     Chickenpox childhood    h/o chickenpox in childhood per patient report     Closed fracture of head of left radius 8/2013    - 8/26/13:  fell on her; L radial head fracture; Dr Engel of [facility] recommended conservative management (also had left hip fracture s/p surgical repair by Dr Magana of [facility])     Closed fracture of unspecified part of neck of femur 8/2013    - 8/26/13 L hip fracture (L proximal femur),  fell on her (also , L radial fracture, conservatively managed); - 8/27/13 Dr Sonu Magana ([facility]), [facility]: ORIF (intramedullary fixation) L hip     ECHOCARDIOGRAM (TTE) wnl 5/2007    - 5/4/07 TTE: NORMAL, EF60% (done because of vertigo/dizzy symptoms)        H/O colonoscopy 06/2016    normal, repeat in 10 years = 2026     History of hepatitis A vaccination     Completed Hep A series per patient report documented in Dr. Carvalho's notes 8/12/05; patient declines testing for immunity 2/13/10//sds     History of hepatitis B vaccination     Completed Hep B series per patient report documented in Dr. Carvalho's notes 8/12/05; patient declines testing for immunity 2/13/10//sds     hx  Breast Lump LEFT, BENIGN 9/2007    - 9/20/07 mammo & u/s: ACR2-benign.  U/s = left upper outer quadrant at 1:30 4 cm from nipple demonstrates fibrous ridge; stable, no significant change since 2005; - sister has h/o breast CA        Hx Holter Monitoring wnl 4/2009    - 4/1/09 NORMAL 24 hour holter monitor interpreted by Dr. Crouch at [place]          Hx of low back pain 3/2013    - 3/16/13 NER low back pain after shoveing snow and kick-boxing video; rxd vicodin and naproxen     Hyperbilirubinemia 2005    Mild elevation of bilrubin dating back to 2005. Total bili around 1.1. Unclear etiology. 2016&17=1.6ish          Menarche 14 y/o    Menarche 14 y/o. Regular cycles q 24-26 days, heavy flow 7 days, last period 1987 (then had BARBARA for menorrhagia)     Meniere's disease 11/1/2009    h/o Meniere's Disease. Referred to [facility] by Dr. Knox 11/2009. Seen by [facility] 11/2008. Normal carotid u/s & TTE 5/2007            Menopause mid 50s y/o?    last period 1987 at 46 y/o (BARBARA for menorrhagia), then menopausal symptoms around 53 y/o     Pneumonia, community acquired 10/2007    - 10/2007: LLL Pneumonia (CAP), uncomplicated       Past Surgical History:   Procedure Laterality Date     HYSTERECTOMY       ORIF HIP FRACTURE Left 8/27/13    - 8/27/13 Dr Sonu Magana ([facility]), [facility]: ORIF (intramedullary fixation) L hip for left proximal femur fracture after  fell on her     LA BIOPSY/EXCISION, LYMPH NODE(S) Left 7/29/03    - 7/29/03 Biopsy Left groin Lymph node; Pathology = NEGATIVE/BENIGN. Performed by Dr[facility] Rhodes at [facility]          LA VAGINAL HYSTERECTOMY,UTERUS 250 GMS/<  1987    - 1987 Dr Gabriel Weiner, [facility]: Vaginal hysterectomy for menorrhagia; cervix removed.            Family History   Problem Relation Age of Onset     Heart disease Mother 92     mother/father CAD 92-92 y/o     Diabetes type II Mother      mother DM2     Hypertension Mother       mother htn     Heart disease Father      mother/father CAD 92-94 y/o     Hyperlipidemia Father      father high lipids, treated with Lipitor     Atrial fibrillation Father      father a fib     Thyroid disease Father      Breast cancer Sister 59     Testicular cancer Brother      Thyroid cancer Niece      Ovarian cancer Neg Hx        Social History   Substance Use Topics     Smoking status: Never Smoker     Smokeless tobacco: Never Used     Alcohol use No       Review of Systems   Constitutional: Negative for chills and fever.   Skin: Positive for color change and rash.   All other systems reviewed and are negative.      Objective:     /64  Pulse 76  Temp 98.4  F (36.9  C) (Oral)   Wt 180 lb 8 oz (81.9 kg)  SpO2 96%  BMI 26.66 kg/m2    Physical Exam   Constitutional: She appears well-developed and well-nourished. No distress.   HENT:   Head: Normocephalic and atraumatic.   Right Ear: External ear normal.   Left Ear: External ear normal.   Eyes: Conjunctivae are normal.   Pulmonary/Chest: Effort normal. No respiratory distress.   Skin: Rash noted. She is not diaphoretic.   Blistering rash present in a streak like pattern on the right forearm. The right forearm rash is weeping. The streak has a surrounding erythema which is warm to the touch. The sounding erythema has no vesicles present. She has a similar streak like rash present on the left forearm and right hip, but they are not as severe and they are not weeping.    Psychiatric: She has a normal mood and affect. Her behavior is normal. Judgment and thought content normal.   Nursing note and vitals reviewed.      Assessment:     Procedures    1. Poison ivy dermatitis  predniSONE (DELTASONE) 20 MG tablet   2. Cellulitis  sulfamethoxazole-trimethoprim (SEPTRA DS) 800-160 mg per tablet         Patient Instructions   1. Take Bactrim twice daily with food.  I recommend taking this medication with Culturelle, a probiotic, if you are prone to stomach upset or  yeast infections with antibiotic use.  2. I recommend taking prednisone 2 tablets daily for a total of 5 days.  The 2 tablets can be taken at the same time, I recommend taking them in the morning to help avoid sleep disturbances.  3.  Follow-up with your primary care provider or here if the redness on her forearm is spreading outside of where it currently is over the next 3 days. Follow up sooner if you develop sick symptoms such as fever or chills.  4.  Change the dressing at least 2 times per day; change in more often if it is seeping through the bandages.  5. The blisters can get wet, but do not soak them or scrub them, and gently pat dry before redressing.          Good

## 2022-10-22 ENCOUNTER — MYC MEDICAL ADVICE (OUTPATIENT)
Dept: FAMILY MEDICINE | Facility: CLINIC | Age: 72
End: 2022-10-22

## 2022-10-26 ENCOUNTER — IMMUNIZATION (OUTPATIENT)
Dept: FAMILY MEDICINE | Facility: CLINIC | Age: 72
End: 2022-10-26
Payer: COMMERCIAL

## 2022-10-26 ENCOUNTER — LAB (OUTPATIENT)
Dept: LAB | Facility: CLINIC | Age: 72
End: 2022-10-26
Payer: COMMERCIAL

## 2022-10-26 DIAGNOSIS — E78.00 PURE HYPERCHOLESTEROLEMIA: ICD-10-CM

## 2022-10-26 DIAGNOSIS — Z00.00 PREVENTATIVE HEALTH CARE: ICD-10-CM

## 2022-10-26 DIAGNOSIS — Z11.59 NEED FOR HEPATITIS B SCREENING TEST: Primary | ICD-10-CM

## 2022-10-26 DIAGNOSIS — Z11.59 NEED FOR HEPATITIS B SCREENING TEST: ICD-10-CM

## 2022-10-26 DIAGNOSIS — Z00.00 PREVENTATIVE HEALTH CARE: Primary | ICD-10-CM

## 2022-10-26 LAB
ALBUMIN SERPL BCG-MCNC: 4.6 G/DL (ref 3.5–5.2)
ALP SERPL-CCNC: 96 U/L (ref 35–104)
ALT SERPL W P-5'-P-CCNC: 19 U/L (ref 10–35)
ANION GAP SERPL CALCULATED.3IONS-SCNC: 16 MMOL/L (ref 7–15)
AST SERPL W P-5'-P-CCNC: 23 U/L (ref 10–35)
BASOPHILS # BLD AUTO: 0.1 10E3/UL (ref 0–0.2)
BASOPHILS NFR BLD AUTO: 1 %
BILIRUB SERPL-MCNC: 1.1 MG/DL
BUN SERPL-MCNC: 15.4 MG/DL (ref 8–23)
CALCIUM SERPL-MCNC: 10 MG/DL (ref 8.8–10.2)
CHLORIDE SERPL-SCNC: 102 MMOL/L (ref 98–107)
CHOLEST SERPL-MCNC: 246 MG/DL
CREAT SERPL-MCNC: 0.78 MG/DL (ref 0.51–0.95)
DEPRECATED HCO3 PLAS-SCNC: 23 MMOL/L (ref 22–29)
EOSINOPHIL # BLD AUTO: 0.3 10E3/UL (ref 0–0.7)
EOSINOPHIL NFR BLD AUTO: 5 %
ERYTHROCYTE [DISTWIDTH] IN BLOOD BY AUTOMATED COUNT: 13.8 % (ref 10–15)
GFR SERPL CREATININE-BSD FRML MDRD: 80 ML/MIN/1.73M2
GLUCOSE SERPL-MCNC: 96 MG/DL (ref 70–99)
HBV SURFACE AB SERPL IA-ACNC: 68.97 M[IU]/ML
HBV SURFACE AB SERPL IA-ACNC: REACTIVE M[IU]/ML
HCT VFR BLD AUTO: 39.6 % (ref 35–47)
HDLC SERPL-MCNC: 88 MG/DL
HGB BLD-MCNC: 13.1 G/DL (ref 11.7–15.7)
HOLD SPECIMEN: NORMAL
IMM GRANULOCYTES # BLD: 0 10E3/UL
IMM GRANULOCYTES NFR BLD: 0 %
LDLC SERPL CALC-MCNC: 140 MG/DL
LYMPHOCYTES # BLD AUTO: 1.1 10E3/UL (ref 0.8–5.3)
LYMPHOCYTES NFR BLD AUTO: 22 %
MCH RBC QN AUTO: 30 PG (ref 26.5–33)
MCHC RBC AUTO-ENTMCNC: 33.1 G/DL (ref 31.5–36.5)
MCV RBC AUTO: 91 FL (ref 78–100)
MONOCYTES # BLD AUTO: 0.4 10E3/UL (ref 0–1.3)
MONOCYTES NFR BLD AUTO: 7 %
NEUTROPHILS # BLD AUTO: 3.4 10E3/UL (ref 1.6–8.3)
NEUTROPHILS NFR BLD AUTO: 65 %
NONHDLC SERPL-MCNC: 158 MG/DL
PLATELET # BLD AUTO: 220 10E3/UL (ref 150–450)
POTASSIUM SERPL-SCNC: 4.2 MMOL/L (ref 3.4–5.3)
PROT SERPL-MCNC: 7.3 G/DL (ref 6.4–8.3)
RBC # BLD AUTO: 4.36 10E6/UL (ref 3.8–5.2)
SODIUM SERPL-SCNC: 141 MMOL/L (ref 136–145)
TRIGL SERPL-MCNC: 91 MG/DL
WBC # BLD AUTO: 5.2 10E3/UL (ref 4–11)

## 2022-10-26 PROCEDURE — 90662 IIV NO PRSV INCREASED AG IM: CPT

## 2022-10-26 PROCEDURE — 36415 COLL VENOUS BLD VENIPUNCTURE: CPT | Performed by: FAMILY MEDICINE

## 2022-10-26 PROCEDURE — 80061 LIPID PANEL: CPT | Performed by: FAMILY MEDICINE

## 2022-10-26 PROCEDURE — 85025 COMPLETE CBC W/AUTO DIFF WBC: CPT | Performed by: FAMILY MEDICINE

## 2022-10-26 PROCEDURE — G0008 ADMIN INFLUENZA VIRUS VAC: HCPCS

## 2022-10-26 PROCEDURE — 80053 COMPREHEN METABOLIC PANEL: CPT | Performed by: FAMILY MEDICINE

## 2022-10-26 PROCEDURE — 86706 HEP B SURFACE ANTIBODY: CPT | Performed by: FAMILY MEDICINE

## 2022-11-01 ENCOUNTER — OFFICE VISIT (OUTPATIENT)
Dept: FAMILY MEDICINE | Facility: CLINIC | Age: 72
End: 2022-11-01
Payer: COMMERCIAL

## 2022-11-01 VITALS
RESPIRATION RATE: 16 BRPM | WEIGHT: 146.25 LBS | HEIGHT: 69 IN | SYSTOLIC BLOOD PRESSURE: 136 MMHG | OXYGEN SATURATION: 99 % | BODY MASS INDEX: 21.66 KG/M2 | TEMPERATURE: 98 F | DIASTOLIC BLOOD PRESSURE: 68 MMHG | HEART RATE: 85 BPM

## 2022-11-01 DIAGNOSIS — E78.00 PURE HYPERCHOLESTEROLEMIA: ICD-10-CM

## 2022-11-01 DIAGNOSIS — Z00.00 PREVENTATIVE HEALTH CARE: ICD-10-CM

## 2022-11-01 LAB
ALBUMIN UR-MCNC: NEGATIVE MG/DL
APPEARANCE UR: CLEAR
BILIRUB UR QL STRIP: NEGATIVE
COLOR UR AUTO: YELLOW
GLUCOSE UR STRIP-MCNC: NEGATIVE MG/DL
HGB UR QL STRIP: NEGATIVE
KETONES UR STRIP-MCNC: NEGATIVE MG/DL
LEUKOCYTE ESTERASE UR QL STRIP: NEGATIVE
NITRATE UR QL: NEGATIVE
PH UR STRIP: 7 [PH] (ref 5–7)
SP GR UR STRIP: 1.01 (ref 1–1.03)
UROBILINOGEN UR STRIP-ACNC: 0.2 E.U./DL

## 2022-11-01 PROCEDURE — 99397 PER PM REEVAL EST PAT 65+ YR: CPT | Performed by: FAMILY MEDICINE

## 2022-11-01 PROCEDURE — 81003 URINALYSIS AUTO W/O SCOPE: CPT | Performed by: FAMILY MEDICINE

## 2022-11-01 ASSESSMENT — ENCOUNTER SYMPTOMS
JOINT SWELLING: 0
DYSURIA: 0
EYE PAIN: 0
HEARTBURN: 0
HEMATURIA: 0
FEVER: 0
DIARRHEA: 0
HEMATOCHEZIA: 0
BREAST MASS: 0
MYALGIAS: 0
ABDOMINAL PAIN: 0
ARTHRALGIAS: 0
SHORTNESS OF BREATH: 0
COUGH: 0
NAUSEA: 0
HEADACHES: 0
PARESTHESIAS: 0
DIZZINESS: 1
SORE THROAT: 0
CONSTIPATION: 0
FREQUENCY: 1
NERVOUS/ANXIOUS: 0
CHILLS: 0
WEAKNESS: 0
PALPITATIONS: 0

## 2022-11-01 ASSESSMENT — ACTIVITIES OF DAILY LIVING (ADL): CURRENT_FUNCTION: NO ASSISTANCE NEEDED

## 2022-11-01 NOTE — PROGRESS NOTES
"SUBJECTIVE:   Mohamud is a 72 year old who presents for Preventive Visit.    Patient has been advised of split billing requirements and indicates understanding: Yes  Are you in the first 12 months of your Medicare coverage?  No    Healthy Habits:     In general, how would you rate your overall health?  Good    Frequency of exercise:  6-7 days/week    Duration of exercise:  45-60 minutes    Do you usually eat at least 4 servings of fruit and vegetables a day, include whole grains    & fiber and avoid regularly eating high fat or \"junk\" foods?  Yes    Taking medications regularly:  Yes    Medication side effects:  Not applicable    Ability to successfully perform activities of daily living:  No assistance needed    Home Safety:  No safety concerns identified    Hearing Impairment:  No hearing concerns    In the past 6 months, have you been bothered by leaking of urine? Yes    In general, how would you rate your overall mental or emotional health?  Good      PHQ-2 Total Score: 2    Additional concerns today:  No    Do you feel safe in your environment? Yes     Gained weight since hip surgery - she thinks it's because she was eating lots of comfort foods  Since hip surgery - increased urination frequncy    Should I continue with Estrace?  Did pelvic floor PT - the person was not great. She is doing Kegel exercises herself  The frequency is improving    Lipids    Sleep - sometimes wakes with    Nose stuffed - alternate sides. Try humidifier    Bone density - getting vit D, calcium    Test for dementia - looses words, names    Ear wax?     - does not care for his health    Exercise - ellipse - does not love it but watches wheel of [employer]  Does not tolerate swimming toes get white with Raynauds    Tired from raking    Have you ever done Advance Care Planning? (For example, a Health Directive, POLST, or a discussion with a medical provider or your loved ones about your wishes): Yes, advance care planning is on " file.      Fall risk  Fallen 2 or more times in the past year?: No  Any fall with injury in the past year?: No  click delete button to remove this line now  Cognitive Screening   1) Repeat 3 items (Leader, Season, Table)    2) Clock draw: NORMAL  3) 3 item recall: Recalls 3 objects  Results: 3 items recalled: COGNITIVE IMPAIRMENT LESS LIKELY    Mini-CogTM Copyright KESHAWN Worley. Licensed by the author for use in [facility]; reprinted with permission (jordon@[facility]). All rights reserved.      Reviewed and updated as needed this visit by clinical staff   Tobacco  Allergies  Meds              Reviewed and updated as needed this visit by Provider                 Social History     Tobacco Use     Smoking status: Never     Smokeless tobacco: Never     Tobacco comments:     No passive smoke exposure   Substance Use Topics     Alcohol use: No     If you drink alcohol do you typically have >3 drinks per day or >7 drinks per week? Not applicable    Alcohol Use 11/1/2022   Prescreen: >3 drinks/day or >7 drinks/week? Not Applicable   No flowsheet data found.    Current providers sharing in care for this patient include:   Patient Care Team:  Chhaya Malone MD as PCP - General (Family Medicine)  Chhaya Malone MD as Assigned PCP    The following health maintenance items are reviewed in Epic and correct as of today:  Health Maintenance   Topic Date Due     Pneumococcal Vaccine: 65+ Years (3) 01/23/2018     COVID-19 Vaccine (4 - Booster for Pfizer series) 12/21/2021     MEDICARE ANNUAL WELLNESS VISIT  10/26/2022     HEPATITIS B IMMUNIZATION (2 of 3 - 3-dose series) 11/23/2022     ANNUAL REVIEW OF HM ORDERS  04/06/2023     FALL RISK ASSESSMENT  11/01/2023     MAMMO SCREENING  12/11/2023     COLORECTAL CANCER SCREENING  06/03/2026     DTAP/TDAP/TD IMMUNIZATION (4 - Td or Tdap) 06/19/2026     ADVANCE CARE PLANNING  10/28/2026     LIPID  10/26/2027     DEXA  05/20/2034     HEPATITIS C SCREENING  Completed     PHQ-2  (once per calendar year)  Completed     INFLUENZA VACCINE  Completed     ZOSTER IMMUNIZATION  Completed     IPV IMMUNIZATION  Aged Out     MENINGITIS IMMUNIZATION  Aged Out     BP Readings from Last 3 Encounters:   11/01/22 136/68   07/12/22 136/72   05/11/22 136/70    Wt Readings from Last 3 Encounters:   11/01/22 66.3 kg (146 lb 4 oz)   07/12/22 71.9 kg (158 lb 8 oz)   05/11/22 72.1 kg (159 lb)                  Patient Active Problem List   Diagnosis     Actinic keratosis     Asymptomatic postmenopausal status     Bilateral primary osteoarthritis of knee     Cataract     Foot pain     Hx of major depression     Insomnia     Intrinsic eczema     Osteoporosis     Polyarthralgia     Postmenopausal atrophic vaginitis     Primary localized osteoarthrosis, ankle and foot     Retinal detachment     Sternoclavicular sprain     Urinary incontinence     Past Surgical History:   Procedure Laterality Date     HYSTERECTOMY       ORIF HIP FRACTURE Left 8/27/13    - 8/27/13 Dr Sonu Magana ([facility]), [facility]: ORIF (intramedullary fixation) L hip for left proximal femur fracture after  fell on her     OTHER SURGICAL HISTORY Left 7/29/03    NH BIOPSY/EXCISION, LYMPH NODE(S)- 7/29/03 Biopsy Left groin Lymph node; Pathology = NEGATIVE/BENIGN. Performed by Dr. Rhodes at [facility]          NH VAGINAL HYSTERECTOMY,UTERUS 250 GMS/<  1987    [facility] [place] Vaginal hysterectomy for menorrhagia; cervix removed.            Social History     Tobacco Use     Smoking status: Never     Smokeless tobacco: Never     Tobacco comments:     No passive smoke exposure   Substance Use Topics     Alcohol use: No     Family History   Problem Relation Age of Onset     Heart Disease Mother 92.00        mother/father CAD 92-94 y/o     Diabetes Type 2  Mother         mother DM2     Hypertension Mother         mother htn     Heart Disease Father 94.00        mother/father CAD 92-94 y/o     Hyperlipidemia  "Father         father high lipids, treated with Lipitor     Atrial fibrillation Father         father a fib     Thyroid Disease Father      Breast Cancer Sister 59.00        cured!! type of cancer not known (non-estrogen positive) stage 4     Testicular cancer Brother 30.00        Mohamud's twin     Thyroid Cancer Niece      Mental Illness Son      Ovarian Cancer No family hx of          Current Outpatient Medications   Medication Sig Dispense Refill     amitriptyline (ELAVIL) 10 MG tablet Take 1-2 tablets (10-20 mg) by mouth At Bedtime 180 tablet 4     carboxymethylcellulose (REFRESH PLUS) 0.5 % SOLN ophthalmic solution        clobetasol (TEMOVATE) 0.05 % external ointment Apply topically 2 times daily Apply topically to rash stomach, arms and neck as directed. 60 g 1     fish oil-omega-3 fatty acids 1000 MG capsule Take 2 g by mouth       melatonin 5 MG tablet Take 1 tablet by mouth       Multiple Vitamins-Minerals (PRESERVISION AREDS 2) CAPS One PO daily 90 capsule 4     Turmeric 500 MG CAPS        vitamin D3 (CHOLECALCIFEROL) 50 mcg (2000 units) tablet Take 1 tablet by mouth daily       zinc 50 MG TABS        Review of Systems      OBJECTIVE:   /68   Pulse 85   Temp 98  F (36.7  C) (Oral)   Resp 16   Ht 1.753 m (5' 9\")   Wt 66.3 kg (146 lb 4 oz)   SpO2 99%   BMI 21.60 kg/m   Estimated body mass index is 21.6 kg/m  as calculated from the following:    Height as of this encounter: 1.753 m (5' 9\").    Weight as of this encounter: 66.3 kg (146 lb 4 oz).  Physical Exam  GENERAL: healthy, alert and no distress  EYES: Eyes grossly normal to inspection, PERRL and conjunctivae and sclerae normal  HENT: both ears: occluded with wax and nose and mouth without ulcers or lesions  NECK: no adenopathy, no asymmetry, masses, or scars and thyroid normal to palpation  RESP: lungs clear to auscultation - no rales, rhonchi or wheezes  CV: regular rate and rhythm, normal S1 S2, no S3 or S4, no murmur, click or rub, no " "peripheral edema and peripheral pulses strong   (female): normal female external genitalia with color indicating good circulation and no atrophy  MS: no gross musculoskeletal defects noted, no edema  SKIN: no suspicious lesions or rashes  NEURO: Normal strength and tone, mentation intact and speech normal  PSYCH: mentation appears normal, affect normal/bright  Ears - wax- debrox  Vaginal mucosa nice and red      ASSESSMENT / PLAN:   (Z00.00) Preventative health care  Comment: she is doing extremely well after hip surgery - has less pain.  Plan: resume good healthy lifestyle    (E78.00) Pure hypercholesterolemia  Comment: will check this at her next visit, as she's not been as healthy now and she plans to reinstate her healthy choices regardless of the lipid result    Return in March/April 2023 - send reminder    COUNSELING:  Reviewed preventive health counseling, as reflected in patient instructions       Regular exercise       Healthy diet/nutrition       Dental care       Bladder control       Immunizations    Vaccinated for: Influenza             Osteoporosis prevention/bone health       Advanced Planning     Estimated body mass index is 21.6 kg/m  as calculated from the following:    Height as of this encounter: 1.753 m (5' 9\").    Weight as of this encounter: 66.3 kg (146 lb 4 oz).        She reports that she has never smoked. She has never used smokeless tobacco.      Appropriate preventive services were discussed with this patient, including applicable screening as appropriate for cardiovascular disease, diabetes, osteopenia/osteoporosis, and glaucoma.  As appropriate for age/gender, discussed screening for colorectal cancer, prostate cancer, breast cancer, and cervical cancer. Checklist reviewing preventive services available has been given to the patient.    Reviewed patients plan of care and provided an AVS. The  for Mohamud meets the Care Plan requirement. This Care Plan has been established and reviewed " with the patient.    Counseling Resources:  ATP IV Guidelines  Pooled Cohorts Equation Calculator  Breast Cancer Risk Calculator  Breast Cancer: Medication to Reduce Risk  FRAX Risk Assessment  ICSI Preventive Guidelines  Dietary Guidelines for Americans, 2010  USDA's MyPlate  ASA Prophylaxis  Lung CA Screening    Chhaya Malone MD  [facility]    Identified Health Risks:

## 2022-12-14 ENCOUNTER — ANCILLARY PROCEDURE (OUTPATIENT)
Dept: MAMMOGRAPHY | Facility: CLINIC | Age: 72
End: 2022-12-14
Attending: FAMILY MEDICINE
Payer: COMMERCIAL

## 2022-12-14 DIAGNOSIS — Z12.31 VISIT FOR SCREENING MAMMOGRAM: ICD-10-CM

## 2022-12-14 PROCEDURE — 77067 SCR MAMMO BI INCL CAD: CPT

## 2022-12-26 ENCOUNTER — OFFICE VISIT (OUTPATIENT)
Dept: PEDIATRICS | Facility: CLINIC | Age: 72
End: 2022-12-26
Payer: COMMERCIAL

## 2022-12-26 VITALS
HEART RATE: 93 BPM | SYSTOLIC BLOOD PRESSURE: 132 MMHG | TEMPERATURE: 98.6 F | WEIGHT: 166 LBS | OXYGEN SATURATION: 100 % | BODY MASS INDEX: 24.59 KG/M2 | DIASTOLIC BLOOD PRESSURE: 70 MMHG | RESPIRATION RATE: 16 BRPM | HEIGHT: 69 IN

## 2022-12-26 DIAGNOSIS — R21 RASH AND NONSPECIFIC SKIN ERUPTION: ICD-10-CM

## 2022-12-26 DIAGNOSIS — B02.9 HERPES ZOSTER WITHOUT COMPLICATION: Primary | ICD-10-CM

## 2022-12-26 PROCEDURE — 99213 OFFICE O/P EST LOW 20 MIN: CPT | Performed by: NURSE PRACTITIONER

## 2022-12-26 PROCEDURE — 99207 E-CONSULT TO DERMATOLOGY (ADULT OUTPT PROVIDER TO SPECIALIST WRITTEN QUESTION & RESPONSE): CPT | Performed by: NURSE PRACTITIONER

## 2022-12-26 ASSESSMENT — PAIN SCALES - GENERAL: PAINLEVEL: NO PAIN (0)

## 2022-12-26 NOTE — PROGRESS NOTES
Assessment & Plan     Herpes zoster without complication  Rash has stopped spreading. No vesicles or crusting present on exam. Advised to continue valacyclovir as prescribed.     Rash and nonspecific skin eruption  Unclear etiology. Little improvement in pruritis with topical corticosteroid cream. Recommend dermatology e-consult, which patient is agreeable to.   - Adult E-Consult to Dermatology (Outpt Provider to Specialist Written Question & Response)      16 minutes spent on the date of the encounter doing chart review, review of outside records, patient visit, documentation and discussion with other provider(s)        MEDICATIONS:  Continue current medications without change  CONSULTATION/REFERRAL to Dermatology.    Return in about 3 days (around 12/29/2022), or if symptoms worsen or fail to improve.    JESSICA [facility] PER Mallory is a 72 year old, presenting for the following health issues:  Derm Problem    Diagnosed with shingles in UC on 12/23/22, started on valacyclovir. Presents today for follow-up. Rash is located on left hip. Shingles rash has stopped growing and is less itchy. She has been taking valacyclovir as prescribed.     Also has a lesion on right leg by her inner knee UC felt this was not related to the shingles. Reports this lesion popped up overnight on 12/23. Has been using clobetasol cream on right knee x 2 days, seems to help decrease itching but the lesion has doubled in size since onset. Does not look similar to anything she had in the past. No known bites or injury.     Patient Active Problem List   Diagnosis     Actinic keratosis     Asymptomatic postmenopausal status     Bilateral primary osteoarthritis of knee     Cataract     Foot pain     Hx of major depression     Insomnia     Intrinsic eczema     Osteoporosis     Polyarthralgia     Postmenopausal atrophic vaginitis     Primary localized osteoarthrosis, ankle and foot     Retinal  detachment     Sternoclavicular sprain     Urinary incontinence     Past Medical History:   Diagnosis Date     Allergy to dogs     - 1/2013: no allergy symptoms and not using antihistamines     Chickenpox childhood    h/o chickenpox in childhood per patient report     Closed fracture of head of left radius 08/01/2013    - 8/26/13:  fell on her; L radial head fracture; Dr Engel of [facility] recommended conservative management (also had left hip fracture s/p surgical repair by Dr Magana of [facility])     Closed fracture of unspecified part of neck of femur 08/01/2013    - 8/26/13 L hip fracture (L proximal femur),  fell on her (also , L radial fracture, conservatively managed); - 8/27/13 Dr Sonu Magana ([facility]), [facility]: ORIF (intramedullary fixation) L hip     H/O colonoscopy 06/01/2016    normal, repeat in 10 years = 2026     H/O echocardiogram 05/01/2007    - 5/4/07 TTE: NORMAL, EF60% (done because of vertigo/dizzy symptoms)        History of hepatitis A vaccination     Completed Hep A series per patient report documented in Dr. Carvalho's notes 8/12/05; patient declines testing for immunity 2/13/10//sds     History of hepatitis B vaccination     Completed Hep B series per patient report documented in Dr. Carvalho's notes 8/12/05; patient declines testing for immunity 2/13/10//sds     History of Holter monitoring 04/01/2009    - 4/1/09 NORMAL 24 hour holter monitor interpreted by Dr. Crouch at [facility] of low back pain 03/01/2013    - 3/16/13 NER low back pain after shoveing snow and kick-boxing video; rxd vicodin and naproxen     Hyperbilirubinemia 01/01/2005    Mild elevation of bilrubin dating back to 2005. Total bili around 1.1. Unclear etiology. 2016&17=1.6ish          Lump or mass in breast 09/01/2007    - 9/20/07 mammo & u/s: ACR2-benign.  U/s = left upper outer quadrant at 1:30 4 cm from nipple demonstrates fibrous ridge; stable, no significant change since 2005;  "- sister has h/o breast CA        Menarche 14 y/o    Menarche 14 y/o. Regular cycles q 24-26 days, heavy flow 7 days, last period 1987 (then had BARBARA for menorrhagia)     Meniere's disease 11/01/2009    h/o Meniere's Disease. Referred to [facility] by Dr. Knox 11/2009. Seen by [facility] 11/2008. Normal carotid u/s & TTE 5/2007            Menopause mid 50s y/o?    last period 1987 at 46 y/o (BARBARA for menorrhagia), then menopausal symptoms around 55 y/o     Osteopenia 01/01/2019    no bone density change from 2016-19.     Pneumonia, community acquired 10/01/2007    - 10/2007: [facility] Pneumonia (CAP), uncomplicated     Radiological examination, not elsewhere classified 05/01/2007    NORMAL carotid u/s 5/4/07 at [place]. Done because of symptoms of vertigo/dizziness     Rash 03/01/2019    biopsied by derm - who said could be lupus, but labs did not correlate.     Retinal detachment of both eyes with single break     age 61     Current Outpatient Medications   Medication     amitriptyline (ELAVIL) 10 MG tablet     carboxymethylcellulose (REFRESH PLUS) 0.5 % SOLN ophthalmic solution     clobetasol (TEMOVATE) 0.05 % external ointment     fish oil-omega-3 fatty acids 1000 MG capsule     melatonin 5 MG tablet     Multiple Vitamins-Minerals (PRESERVISION AREDS 2) CAPS     Turmeric 500 MG CAPS     vitamin D3 (CHOLECALCIFEROL) 50 mcg (2000 units) tablet     zinc 50 MG TABS     No current facility-administered medications for this visit.        Allergies   Allergen Reactions     Cephalosporins Rash     Other reaction(s): *Unknown     Erythromycin Rash     Rash per patient.  Rash per patient.       Penicillins Rash     Rash per patient.  Rash per patient.           Review of Systems    ROS: 10 point ROS neg other than the symptoms noted above in the HPI.        Objective    /70 (Cuff Size: Adult Regular)   Pulse 93   Temp 98.6  F (37  C) (Tympanic)   Resp 16   Ht 1.753 m (5' 9\")   Wt 75.3 kg " (166 lb)   SpO2 100%   BMI 24.51 kg/m    Body mass index is 24.51 kg/m .  Physical Exam  Constitutional:       General: She is not in acute distress.     Appearance: Normal appearance. She is not ill-appearing or toxic-appearing.   Cardiovascular:      Rate and Rhythm: Normal rate.   Pulmonary:      Effort: Pulmonary effort is normal. No respiratory distress.   Skin:     General: Skin is warm and dry.   Neurological:      General: No focal deficit present.      Mental Status: She is alert and oriented to person, place, and time.   Psychiatric:         Mood and Affect: Mood normal.         Behavior: Behavior normal.        Left hip, previously diagnosed as shingles.      Right leg near inner knee/thigh.

## 2022-12-26 NOTE — PATIENT INSTRUCTIONS
Continue the valacyclovir as prescribed.   Can continue the topical steroid cream to your right leg if you feel it is helping.   I will place the dermatology consult today and message you over mychart when I hear back.

## 2022-12-28 ENCOUNTER — E-CONSULT (OUTPATIENT)
Dept: DERMATOLOGY | Facility: CLINIC | Age: 72
End: 2022-12-28
Payer: COMMERCIAL

## 2022-12-28 PROCEDURE — 99451 NTRPROF PH1/NTRNET/EHR 5/>: CPT | Performed by: DERMATOLOGY

## 2022-12-29 ENCOUNTER — TELEPHONE (OUTPATIENT)
Dept: PEDIATRICS | Facility: CLINIC | Age: 72
End: 2022-12-29

## 2022-12-29 NOTE — PROGRESS NOTES
12/28/2022     E-Consult has been accepted.    Interprofessional consultation requested by:  Estela Basurto APRN CNP      Clinical Question/Purpose: MY CLINICAL QUESTION IS: Diagnosed with shingles on 12/23 at . Lesion on right leg by her inner knee/thigh. Reports this lesion popped up overnight on 12/23.  felt this was not related to the shingles. Has been using clobetasol cream on right knee x 2 days, seems to help decrease itching but the lesion has doubled in size since onset. Does not look similar to anything she had in the past. No known bites or injury. Trial of lamisil?     Patient assessment and information reviewed: clinical notes, clinical photos    Recommendations: Rash on the right medial knee/thigh appears most consistent with papular urticaria, although an atypical presentation of zoster is also a diagnostic possibility. The latter is felt to be less likely given the overall morphology and distribution distinct from other dermatomal involvement. The rash does not appear consistent with tinea (not annular, no overlying scale), so do not advise topical terbinafine. Agree with application of clobetasol BID. Would add adjunctive cetirizine 10 mg BID and complete course of valacyclovir as prescribed. If eruption persists or spreads, recommend formal dermatology referral.      The recommendations provided in this E-Consult are based on a review of clinical data pertinent to the clinical question presented, without a review of the patient's complete medical record or, the benefit of a comprehensive in-person or virtual patient evaluation. This consultation should not replace the clinical judgement and evaluation of the provider ordering this E-Consult. Any new clinical issues, or changes in patient status since the filing of this E-Consult will need to be taken into account when assessing these recommendations. Please contact me if you have further questions.    My total time spent reviewing clinical  information and formulating assessment was 10 minutes.    Gabriel Vickers MD, FAAD   of Dermatology  Department of Dermatology  [facility]

## 2022-12-29 NOTE — TELEPHONE ENCOUNTER
Calling patient to relay dermatology recommendations.     Recommendations: Rash on the right medial knee/thigh appears most consistent with papular urticaria, although an atypical presentation of zoster is also a diagnostic possibility. The latter is felt to be less likely given the overall morphology and distribution distinct from other dermatomal involvement. The rash does not appear consistent with tinea (not annular, no overlying scale), so do not advise topical terbinafine. Agree with application of clobetasol BID. Would add adjunctive cetirizine 10 mg BID and complete course of valacyclovir as prescribed. If eruption persists or spreads, recommend formal dermatology referral.    Patient reports the rash on her right knee/thigh and left hip are improving in appearance.     Estela Basurto, DNP, APRN, CNP

## 2023-01-18 DIAGNOSIS — F51.01 PRIMARY INSOMNIA: ICD-10-CM

## 2023-01-18 DIAGNOSIS — Z76.0 ENCOUNTER FOR MEDICATION REFILL: Primary | ICD-10-CM

## 2023-01-19 RX ORDER — AMITRIPTYLINE HYDROCHLORIDE 10 MG/1
TABLET ORAL
Qty: 180 TABLET | Refills: 3 | Status: SHIPPED | OUTPATIENT
Start: 2023-01-19 | End: 2023-11-09

## 2023-01-23 ENCOUNTER — VIRTUAL VISIT (OUTPATIENT)
Dept: FAMILY MEDICINE | Facility: CLINIC | Age: 73
End: 2023-01-23
Payer: COMMERCIAL

## 2023-01-23 ENCOUNTER — NURSE TRIAGE (OUTPATIENT)
Dept: NURSING | Facility: CLINIC | Age: 73
End: 2023-01-23

## 2023-01-23 DIAGNOSIS — R21 RASH: Primary | ICD-10-CM

## 2023-01-23 PROCEDURE — 99442 PR PHYSICIAN TELEPHONE EVALUATION 11-20 MIN: CPT | Performed by: NURSE PRACTITIONER

## 2023-01-23 RX ORDER — VALACYCLOVIR HYDROCHLORIDE 1 G/1
1000 TABLET, FILM COATED ORAL 3 TIMES DAILY
Qty: 21 TABLET | Refills: 0 | Status: SHIPPED | OUTPATIENT
Start: 2023-01-23 | End: 2023-03-02

## 2023-01-23 NOTE — TELEPHONE ENCOUNTER
Telephone call  2 spots left leg  Itchy and red on the left leg.  She had shingles on the left hip just before Jen and she is worried that it is shingles again.  She did not have pain with the first case of it only itchy and she states it looks the same and it feels the same.    Per protocol go to the ED/UC or to office with PCP approval.  Care advice given.  Verbalizes understanding and agrees with plan.   Transferred to scheduling for a appointment.    Shivani Metz RN   [facility] Nurse Advisor  1:40 PM 1/23/2023            Reason for Disposition    Shingles rash and spots start appearing other places on body    Additional Information    Negative: Difficult to awaken or acting confused (e.g., disoriented, slurred speech)    Negative: Sounds like a life-threatening emergency to the triager    Negative: Localized rash and doesn't match the SYMPTOMS of shingles    Negative: Back pain and doesn't match the SYMPTOMS of shingles    Negative: Shingles Vaccine (Recombinant Zoster Vaccine; RZV; Shingrix), questions about    Negative: Shingles rash of face and eye pain or blurred vision    Negative: Shingles rash on the eyelid or tip of the nose    Protocols used: SHINGLES (ZOSTER)-A-OH

## 2023-01-23 NOTE — PROGRESS NOTES
Mohamud is a 72 year old who is being evaluated via a billable video visit.      How would you like to obtain your AVS? [email or web address]  If the video visit is dropped, the invitation should be resent by: Text to cell phone: [phone]  Will anyone else be joining your video visit? No      Assessment & Plan     Rash  Mohamud was unable to connect to video or upload a photo into [email or web address]. She was able to text me a photo, and there are 2 erythematous patches with what appear to be early vesicles to the left calf. Unclear if this is zoster given she has had it so recently although it does seem to have that appearance. Start Valtrex, can continue clobetasol. Follow up with PCP.  - valACYclovir (VALTREX) 1000 mg tablet; Take 1 tablet (1,000 mg) by mouth 3 times daily for 7 days    Patient Instructions   Start Valtrex.  Follow up with your PCP for further evaluation.      Return in about 6 weeks (around 3/6/2023).    JESSICA [facility]   Mohamud is a 72 year old, presenting for the following health issues:  Shingles      HPI     Concern - shingles  Onset: just noticed this morning  Description: two spots on her left leg, itchy and red  Intensity: moderate  Progression of Symptoms:  same  Accompanying Signs & Symptoms: none  Previous history of similar problem: Yes, just had shingles the around Christmas  Precipitating factors:        Worsened by: none  Alleviating factors:        Improved by: none  Therapies tried and outcome: Clobetasol - helped with itching        Review of Systems   Constitutional, HEENT, cardiovascular, pulmonary, gi and gu systems are negative, except as otherwise noted.      Objective           Vitals:  No vitals were obtained today due to virtual visit.    Physical Exam   GENERAL: Healthy, alert and no distress                  Video-Visit Details    Type of service:  Video Visit   Video Start Time: unable to connect  Video End Time:unable to  connect    Originating Location (pt. Location): Home    Distant Location (provider location):  On-site  Platform used for Video Visit: Unable to complete video visit   Telephone 14 minutes

## 2023-03-01 ENCOUNTER — OFFICE VISIT (OUTPATIENT)
Dept: FAMILY MEDICINE | Facility: CLINIC | Age: 73
End: 2023-03-01
Payer: COMMERCIAL

## 2023-03-01 VITALS
OXYGEN SATURATION: 98 % | BODY MASS INDEX: 24.26 KG/M2 | RESPIRATION RATE: 14 BRPM | SYSTOLIC BLOOD PRESSURE: 146 MMHG | WEIGHT: 163.8 LBS | HEIGHT: 69 IN | TEMPERATURE: 97.7 F | DIASTOLIC BLOOD PRESSURE: 70 MMHG | HEART RATE: 76 BPM

## 2023-03-01 DIAGNOSIS — M67.911 BILATERAL SHOULDER TENDINOPATHY: Primary | ICD-10-CM

## 2023-03-01 DIAGNOSIS — M67.912 BILATERAL SHOULDER TENDINOPATHY: Primary | ICD-10-CM

## 2023-03-01 DIAGNOSIS — N39.41 URGE INCONTINENCE OF URINE: ICD-10-CM

## 2023-03-01 DIAGNOSIS — N95.2 POSTMENOPAUSAL ATROPHIC VAGINITIS: ICD-10-CM

## 2023-03-01 PROCEDURE — 99214 OFFICE O/P EST MOD 30 MIN: CPT | Performed by: FAMILY MEDICINE

## 2023-03-01 NOTE — PROGRESS NOTES
"  Assessment & Plan     Bilateral shoulder tendinopathy  While it is not clear what caused this, she seems to have a myalgia or tendonopathy bilaterally on the posterior shoulder    Postmenopausal atrophic vaginitis  Treating with topical estrogen    Urge incontinence of urine  Not affected by amitriptyline - which surprises me. She declines a second med to help at this time.      Review of external notes as documented elsewhere in note  Ordering of each unique test  Prescription drug management  30 minutes spent on the date of the encounter doing chart review, history and exam, documentation and further activities per the note    Return in about 3 months (around 6/1/2023) for Follow up.    Chhaya Malone MD  [facility] JASON Mallory is a 72 year old, presenting for the following health issues:  Shoulder left (Catching Rubbing )      Shoulder left    History of Present Illness       Reason for visit:  Lifting my left arm straight up & down - not smooth coming down but no painShe consumes 0 sweetened beverage(s) daily.She exercises with enough effort to increase her heart rate 60 or more minutes per day.  She exercises with enough effort to increase her heart rate 7 days per week.   She is taking medications regularly.     Has been having hot flashes - about once per week. During the day or night. Not super strong. Not long.    Estrace - trying to do it weekly    Amitriptyline is helping sleep but does not change her urination.    Urinating problems - can be sitting and the leaking starts on it's own. Has not done kegels. Typically urinates a lot of volume all the time.    Review of Systems         Objective    BP (!) 146/70   Pulse 76   Temp 97.7  F (36.5  C) (Oral)   Resp 14   Ht 1.75 m (5' 8.9\")   Wt 74.3 kg (163 lb 12.8 oz)   SpO2 98%   BMI 24.26 kg/m    Body mass index is 24.26 kg/m .  Physical Exam   GENERAL: healthy, alert and no distress  NECK: no adenopathy, no " asymmetry, masses, or scars and thyroid normal to palpation  MS: bulk and tone symmetric; with deep palpation on the posterior shoulders, just under the deltoid, there was an area with tenderness - I believe the teres minor muscle; this was not tight and knotted  SKIN: no suspicious lesions or rashes    Office Visit on 11/01/2022   Component Date Value Ref Range Status     Color Urine 11/01/2022 Yellow  Colorless, Straw, Light Yellow, Yellow Final     Appearance Urine 11/01/2022 Clear  Clear Final     Glucose Urine 11/01/2022 Negative  Negative, 1000 , >=2000 mg/dL Final     Bilirubin Urine 11/01/2022 Negative  Negative Final     Ketones Urine 11/01/2022 Negative  Negative, 160  mg/dL Final     Specific Gravity Urine 11/01/2022 1.015  1.005 - 1.030 Final     Blood Urine 11/01/2022 Negative  Negative Final     pH Urine 11/01/2022 7.0  5.0 - 7.0 Final     Protein Albumin Urine 11/01/2022 Negative  Negative, 300 , >=2000 mg/dL Final     Urobilinogen Urine 11/01/2022 0.2  0.2, 1.0 E.U./dL Final     Nitrite Urine 11/01/2022 Negative  Negative Final     Leukocyte Esterase Urine 11/01/2022 Negative  Negative Final

## 2023-03-02 ENCOUNTER — MYC MEDICAL ADVICE (OUTPATIENT)
Dept: FAMILY MEDICINE | Facility: CLINIC | Age: 73
End: 2023-03-02
Payer: COMMERCIAL

## 2023-04-04 ENCOUNTER — OFFICE VISIT (OUTPATIENT)
Dept: FAMILY MEDICINE | Facility: CLINIC | Age: 73
End: 2023-04-04
Payer: COMMERCIAL

## 2023-04-04 ENCOUNTER — TRANSFERRED RECORDS (OUTPATIENT)
Dept: HEALTH INFORMATION MANAGEMENT | Facility: CLINIC | Age: 73
End: 2023-04-04

## 2023-04-04 VITALS
DIASTOLIC BLOOD PRESSURE: 78 MMHG | HEART RATE: 84 BPM | HEIGHT: 69 IN | SYSTOLIC BLOOD PRESSURE: 136 MMHG | WEIGHT: 161.25 LBS | TEMPERATURE: 98 F | RESPIRATION RATE: 16 BRPM | BODY MASS INDEX: 23.88 KG/M2 | OXYGEN SATURATION: 98 %

## 2023-04-04 DIAGNOSIS — Z00.00 PREVENTATIVE HEALTH CARE: ICD-10-CM

## 2023-04-04 DIAGNOSIS — L98.9 FACE LESION: Primary | ICD-10-CM

## 2023-04-04 DIAGNOSIS — N95.2 POSTMENOPAUSAL ATROPHIC VAGINITIS: ICD-10-CM

## 2023-04-04 DIAGNOSIS — N39.41 URGE INCONTINENCE OF URINE: ICD-10-CM

## 2023-04-04 DIAGNOSIS — Z80.8 FAMILY HISTORY OF MELANOMA: ICD-10-CM

## 2023-04-04 DIAGNOSIS — E78.00 PURE HYPERCHOLESTEROLEMIA: ICD-10-CM

## 2023-04-04 LAB
ALBUMIN UR-MCNC: NEGATIVE MG/DL
APPEARANCE UR: CLEAR
BACTERIA #/AREA URNS HPF: ABNORMAL /HPF
BILIRUB UR QL STRIP: NEGATIVE
COLOR UR AUTO: YELLOW
GLUCOSE UR STRIP-MCNC: NEGATIVE MG/DL
HGB UR QL STRIP: ABNORMAL
KETONES UR STRIP-MCNC: NEGATIVE MG/DL
LEUKOCYTE ESTERASE UR QL STRIP: ABNORMAL
MUCOUS THREADS #/AREA URNS LPF: PRESENT /LPF
NITRATE UR QL: NEGATIVE
PH UR STRIP: 7.5 [PH] (ref 5–7)
RBC #/AREA URNS AUTO: ABNORMAL /HPF
SP GR UR STRIP: 1.01 (ref 1–1.03)
SQUAMOUS #/AREA URNS AUTO: ABNORMAL /LPF
UROBILINOGEN UR STRIP-ACNC: 0.2 E.U./DL
WBC #/AREA URNS AUTO: ABNORMAL /HPF

## 2023-04-04 PROCEDURE — 99214 OFFICE O/P EST MOD 30 MIN: CPT | Performed by: FAMILY MEDICINE

## 2023-04-04 PROCEDURE — 81001 URINALYSIS AUTO W/SCOPE: CPT | Performed by: FAMILY MEDICINE

## 2023-04-04 RX ORDER — ESTRADIOL 0.1 MG/G
2 CREAM VAGINAL
Qty: 43 G | Refills: 11 | Status: SHIPPED | OUTPATIENT
Start: 2023-04-06 | End: 2023-06-13

## 2023-04-04 NOTE — PROGRESS NOTES
Assessment & Plan     Face lesion  Very small, but possibly with blood vessels - thus, I Think it should be evaluated by a dermatologist  - Adult Dermatology Referral    Family history of melanoma  This is new information - so she should have a full body exam by a dermatologist.  - Adult Dermatology Referral  - Lipid panel reflex to direct LDL Fasting    Pure hypercholesterolemia  Following without her on statin. Will check next time  - Lipid panel reflex to direct LDL Non-fasting    Urge incontinence of urine  UA is not clear but does not seem infected. Keep following so we don't miss a UTI as cause of incontinence  - UA Macro with Reflex to Micro and Culture - lab collect  - UA Macro with Reflex to Micro and Culture - lab collect  - UA Microscopic with Reflex to Culture    Preventative health care  reviewed  - REVIEW OF HEALTH MAINTENANCE PROTOCOL ORDERS    Postmenopausal atrophic vaginitis  She wants to continue estrace to resolve her hot flashes.   - estradiol (ESTRACE) 0.1 MG/GM vaginal cream  Dispense: 43 g; Refill: 11    Dementia questions  The [employer] option does not look good - do the things you're already doing (exercise, socializing, getting sleep, etc).    Review of external notes as documented elsewhere in note  Ordering of each unique test  Prescription drug management  35 minutes spent by me on the date of the encounter doing chart review, history and exam, documentation and further activities per the note    Chhaya Malone MD  [facility]   Mohamud Mondragon is a 73 year old year old accompanied by her no one, presenting for the following health issues:  Follow Up      History of Present Illness       Reason for visit:  Colesterol check    She eats 4 or more servings of fruits and vegetables daily.She consumes 0 sweetened beverage(s) daily.She exercises with enough effort to increase her heart rate 30 to 60 minutes per day.  She exercises with enough  "effort to increase her heart rate 7 days per week.   She is taking medications regularly.     Sleep - better. Up once per night, goes to the bathroom - a lot - gets back to sleep better.    Check ua for baseline check. No urine symptoms at this time.  Check lipids today, or wait.  Has had more energy - has organized photos and it took a month!    Left shoulder clunk - x 3 months. No pain with it. She avoids lifting her arm with exercising so the clunk does not happen.    W[place]hole    Is now on MAPPING - can she do her AWV earlier? With Covid, that got pushed back later in the year than she likes having it.    Sanguine - Dr. Cash - for dementia; took a quiz got 63% ave, others 23% low ave  Vitamins - NEUROQ - have to sign up before getting appointment; special diet, etc. Is this a good idea to do?    Review of Systems       Objective    /78   Pulse 84   Temp 98  F (36.7  C) (Oral)   Resp 16   Ht 1.75 m (5' 8.9\")   Wt 73.1 kg (161 lb 4 oz)   SpO2 98%   BMI 23.88 kg/m    Body mass index is 23.88 kg/m .  Physical Exam   GENERAL: healthy, alert and no distress  NECK: no adenopathy, no asymmetry, masses, or scars and thyroid normal to palpation  RESP: lungs clear to auscultation - no rales, rhonchi or wheezes  CV: regular rate and rhythm, normal S1 S2, no S3 or S4, no murmur, click or rub, no peripheral edema and peripheral pulses strong  MS: no gross musculoskeletal defects noted, no edema  SKIN: angiomata - right cheek    Results for orders placed or performed in visit on 04/04/23   UA Macro with Reflex to Micro and Culture - lab collect     Status: Abnormal    Specimen: Urine, NOS   Result Value Ref Range    Color Urine Yellow Colorless, Straw, Light Yellow, Yellow    Appearance Urine Clear Clear    Glucose Urine Negative Negative mg/dL    Bilirubin Urine Negative Negative    Ketones Urine Negative Negative mg/dL    Specific Gravity Urine 1.015 1.005 - 1.030    Blood Urine Trace (A) " Negative    pH Urine 7.5 (H) 5.0 - 7.0    Protein Albumin Urine Negative Negative mg/dL    Urobilinogen Urine 0.2 0.2, 1.0 E.U./dL    Nitrite Urine Negative Negative    Leukocyte Esterase Urine Trace (A) Negative   UA Microscopic with Reflex to Culture     Status: Abnormal   Result Value Ref Range    Bacteria Urine Few (A) None Seen /HPF    RBC Urine 0-2 0-2 /HPF /HPF    WBC Urine 0-5 0-5 /HPF /HPF    Squamous Epithelials Urine Few (A) None Seen /LPF    Mucus Urine Present (A) None Seen /LPF    Narrative    Urine Culture not indicated

## 2023-04-06 ENCOUNTER — LAB (OUTPATIENT)
Dept: LAB | Facility: CLINIC | Age: 73
End: 2023-04-06
Payer: COMMERCIAL

## 2023-04-06 DIAGNOSIS — Z80.8 FAMILY HISTORY OF MELANOMA: ICD-10-CM

## 2023-04-06 LAB
CHOLEST SERPL-MCNC: 207 MG/DL
HDLC SERPL-MCNC: 78 MG/DL
LDLC SERPL CALC-MCNC: 116 MG/DL
NONHDLC SERPL-MCNC: 129 MG/DL
TRIGL SERPL-MCNC: 66 MG/DL

## 2023-04-06 PROCEDURE — 80061 LIPID PANEL: CPT | Performed by: FAMILY MEDICINE

## 2023-04-06 PROCEDURE — 36415 COLL VENOUS BLD VENIPUNCTURE: CPT | Performed by: FAMILY MEDICINE

## 2023-05-17 ENCOUNTER — TRANSFERRED RECORDS (OUTPATIENT)
Dept: HEALTH INFORMATION MANAGEMENT | Facility: CLINIC | Age: 73
End: 2023-05-17
Payer: COMMERCIAL

## 2023-06-07 ENCOUNTER — MYC MEDICAL ADVICE (OUTPATIENT)
Dept: FAMILY MEDICINE | Facility: CLINIC | Age: 73
End: 2023-06-07
Payer: COMMERCIAL

## 2023-06-13 ENCOUNTER — TELEPHONE (OUTPATIENT)
Dept: OTHER | Facility: CLINIC | Age: 73
End: 2023-06-13

## 2023-06-13 ENCOUNTER — OFFICE VISIT (OUTPATIENT)
Dept: FAMILY MEDICINE | Facility: CLINIC | Age: 73
End: 2023-06-13
Payer: COMMERCIAL

## 2023-06-13 VITALS
WEIGHT: 163.8 LBS | OXYGEN SATURATION: 100 % | DIASTOLIC BLOOD PRESSURE: 76 MMHG | BODY MASS INDEX: 24.26 KG/M2 | TEMPERATURE: 98.3 F | SYSTOLIC BLOOD PRESSURE: 150 MMHG | RESPIRATION RATE: 16 BRPM | HEIGHT: 69 IN

## 2023-06-13 DIAGNOSIS — R30.0 DYSURIA: Primary | ICD-10-CM

## 2023-06-13 DIAGNOSIS — N95.2 POSTMENOPAUSAL ATROPHIC VAGINITIS: ICD-10-CM

## 2023-06-13 DIAGNOSIS — R31.29 MICROSCOPIC HEMATURIA: ICD-10-CM

## 2023-06-13 DIAGNOSIS — Z29.89 INDICATION PRESENT FOR ENDOCARDITIS PROPHYLAXIS: ICD-10-CM

## 2023-06-13 DIAGNOSIS — R30.0 DYSURIA: ICD-10-CM

## 2023-06-13 DIAGNOSIS — R35.0 URINARY FREQUENCY: Primary | ICD-10-CM

## 2023-06-13 DIAGNOSIS — R03.0 ELEVATED BP WITHOUT DIAGNOSIS OF HYPERTENSION: ICD-10-CM

## 2023-06-13 LAB
ALBUMIN UR-MCNC: NEGATIVE MG/DL
APPEARANCE UR: CLEAR
BACTERIA #/AREA URNS HPF: ABNORMAL /HPF
BILIRUB UR QL STRIP: NEGATIVE
COLOR UR AUTO: YELLOW
GLUCOSE UR STRIP-MCNC: NEGATIVE MG/DL
HGB UR QL STRIP: ABNORMAL
KETONES UR STRIP-MCNC: NEGATIVE MG/DL
LEUKOCYTE ESTERASE UR QL STRIP: NEGATIVE
NITRATE UR QL: NEGATIVE
PH UR STRIP: 7 [PH] (ref 5–7)
RBC #/AREA URNS AUTO: ABNORMAL /HPF
SP GR UR STRIP: 1.01 (ref 1–1.03)
SQUAMOUS #/AREA URNS AUTO: ABNORMAL /LPF
UROBILINOGEN UR STRIP-ACNC: 0.2 E.U./DL
WBC #/AREA URNS AUTO: ABNORMAL /HPF

## 2023-06-13 PROCEDURE — 99215 OFFICE O/P EST HI 40 MIN: CPT | Performed by: FAMILY MEDICINE

## 2023-06-13 PROCEDURE — 81001 URINALYSIS AUTO W/SCOPE: CPT | Performed by: FAMILY MEDICINE

## 2023-06-13 RX ORDER — CLINDAMYCIN HCL 300 MG
CAPSULE ORAL
Qty: 2 CAPSULE | Refills: 3 | Status: SHIPPED | OUTPATIENT
Start: 2023-06-13 | End: 2023-11-09

## 2023-06-13 RX ORDER — ESTRADIOL 0.1 MG/G
2 CREAM VAGINAL
Qty: 43 G | Refills: 11 | Status: SHIPPED | OUTPATIENT
Start: 2023-06-15 | End: 2023-11-09

## 2023-06-13 NOTE — PROGRESS NOTES
Assessment & Plan     Postmenopausal atrophic vaginitis  Refilled to her mail-order pharmacy  - estradiol (ESTRACE) 0.1 MG/GM vaginal cream  Dispense: 43 g; Refill: 11    Urinary frequency  monitor  - UA with Microscopic reflex to Culture - Clinic Collect  - CT Abdomen Pelvis w/o Contrast    Dysuria  UA is normal other than blood  - UA Macroscopic with reflex to Microscopic and Culture  - UA Microscopic with Reflex to Culture  - CT Abdomen Pelvis w/o Contrast    Microscopic hematuria  This is very small amount, but given her symptoms, will check a stone run CT for a kidney stone that could be causing this. If the amount of blood in the urine increases, will then send her to urology.  - CT Abdomen Pelvis w/o Contrast    Elevated BP without diagnosis of hypertension  Likely due to the stressful day yesterday      Review of external notes as documented elsewhere in note  52 minutes spent by me on the date of the encounter doing chart review, history and exam, documentation and further activities per the note    Chhaya Malone MD  [facility] JASON Mallory is a 73 year old, presenting for the following health issues:  clindamycin for dental visit  and Urinary Frequency        6/13/2023     7:39 AM   Additional Questions   Roomed by chaparro maloney     History of Present Illness       Reason for visit:  Urinating    She eats 4 or more servings of fruits and vegetables daily.She consumes 0 sweetened beverage(s) daily.She exercises with enough effort to increase her heart rate 60 or more minutes per day.  She exercises with enough effort to increase her heart rate 7 days per week.   She is taking medications regularly.     Urine is not right - gets up nightly to use the toilet, but sometimes much more - as many as 4 times per night. No pain with urination at all. Needs a refill on the topical estrogen. Is also urinating more during the day. She kept track of when she used the toilet and  "tracked it.    Yesterday was a hard day - [place]kids [place] the zoo [place]  wanted attention/help. They spoke about it some last night be Mohamud hopes to talk more about it today as she continues to ruminate on it.    Sleep - does not nap but rests during the day for 30-60min. sleeps 6-7 hrs/night. Amitriptyline 20mg. Tried 30mg and that was too much.    Has a personal h/o kidney stone in her 40s - presumably (hard to urinate, finally did and from there she instantly felt well). Had this 2 weeks ago - after urinating, felt better. No hesitancy or blood in urine.    Review of Systems         Objective    BP (!) 150/76   Temp 98.3  F (36.8  C) (Oral)   Resp 16   Ht 1.752 m (5' 8.98\")   Wt 74.3 kg (163 lb 12.8 oz)   SpO2 100%   BMI 24.21 kg/m    Body mass index is 24.21 kg/m .  Physical Exam   GENERAL: healthy, alert and no distress  MS: no gross musculoskeletal defects noted, no edema  PSYCH: mentation appears normal, affect normal/bright    CT pending            "

## 2023-06-13 NOTE — TELEPHONE ENCOUNTER
[facility] VASCULAR [facility] CENTER    Who is the name of the provider?:  Dr Mejia  What is the location you see this provider at/preferred location?: [place]  Person calling / Facility: Mohamud Alanna  Phone number:  [phone]  Nurse call back needed:  yes    Reason for call:  Patient called wanting to schedule a follow-up appointment with Dr Mejia;  Pt is wanting to know if the same restrictions still apply per her last virtual visit on 04/12/21    Pharmacy location:  n/a  Outside Imaging: n/a   Can we leave a detailed message on this number?  n/a

## 2023-06-13 NOTE — TELEPHONE ENCOUNTER
"Patient has not seen Dr. Mejia since 4/12/21.   I called Mohamud and she states she has been doing well and has not had any symptoms of raynauds for the past 6 months.     \" I would like to know if [place] can [place] or if it will turn my toes purple?\"     I explained that would depend on how cold the water is at the lake as it is the beginning of June and some of the lakes water is still quite cold. I suggested maybe swimming in a heated pool so there is not such a drop in temperature.     Mhoamud responded \" I have already swam in a pool and it makes my feet cold. I can see you are not going to give me the answer I am looking for\"    I again stated we cannot definitively say if she would have worsening raynaud's symptoms by swimming in the lake as there is too many variables and she would need to make the choice to try it or not. She verbalized understanding.    Toshia JEFFREY RN    [facility]  Office: [phone]  Fax: [phone]        "

## 2023-06-19 ENCOUNTER — HOSPITAL ENCOUNTER (OUTPATIENT)
Dept: CT IMAGING | Facility: HOSPITAL | Age: 73
Discharge: HOME | End: 2023-06-19
Attending: FAMILY MEDICINE | Admitting: FAMILY MEDICINE
Payer: COMMERCIAL

## 2023-06-19 DIAGNOSIS — R35.0 URINARY FREQUENCY: ICD-10-CM

## 2023-06-19 DIAGNOSIS — R30.0 DYSURIA: ICD-10-CM

## 2023-06-19 DIAGNOSIS — R31.29 MICROSCOPIC HEMATURIA: ICD-10-CM

## 2023-06-19 PROCEDURE — 74176 CT ABD & PELVIS W/O CONTRAST: CPT

## 2023-06-22 DIAGNOSIS — R93.429 ABNORMAL FINDING ON DIAGNOSTIC IMAGING OF KIDNEY: Primary | ICD-10-CM

## 2023-08-15 ENCOUNTER — OFFICE VISIT (OUTPATIENT)
Dept: ONCOLOGY | Facility: CLINIC | Age: 73
End: 2023-08-15
Attending: STUDENT IN AN ORGANIZED HEALTH CARE EDUCATION/TRAINING PROGRAM
Payer: COMMERCIAL

## 2023-08-15 ENCOUNTER — HOSPITAL ENCOUNTER (OUTPATIENT)
Dept: CT IMAGING | Facility: HOSPITAL | Age: 73
Discharge: HOME | End: 2023-08-15
Attending: STUDENT IN AN ORGANIZED HEALTH CARE EDUCATION/TRAINING PROGRAM | Admitting: STUDENT IN AN ORGANIZED HEALTH CARE EDUCATION/TRAINING PROGRAM
Payer: COMMERCIAL

## 2023-08-15 VITALS
OXYGEN SATURATION: 99 % | BODY MASS INDEX: 23.96 KG/M2 | DIASTOLIC BLOOD PRESSURE: 77 MMHG | WEIGHT: 161.8 LBS | RESPIRATION RATE: 18 BRPM | HEART RATE: 73 BPM | SYSTOLIC BLOOD PRESSURE: 167 MMHG | HEIGHT: 69 IN

## 2023-08-15 DIAGNOSIS — N28.89 RENAL MASS OF UNKNOWN NATURE: Primary | ICD-10-CM

## 2023-08-15 DIAGNOSIS — D35.02 ADENOMA OF LEFT ADRENAL GLAND: ICD-10-CM

## 2023-08-15 DIAGNOSIS — N28.89 RENAL MASS OF UNKNOWN NATURE: ICD-10-CM

## 2023-08-15 DIAGNOSIS — R31.29 MICROSCOPIC HEMATURIA: ICD-10-CM

## 2023-08-15 LAB
CREAT BLD-MCNC: 0.7 MG/DL (ref 0.6–1.1)
GFR SERPL CREATININE-BSD FRML MDRD: >60 ML/MIN/1.73M2

## 2023-08-15 PROCEDURE — 99204 OFFICE O/P NEW MOD 45 MIN: CPT | Performed by: STUDENT IN AN ORGANIZED HEALTH CARE EDUCATION/TRAINING PROGRAM

## 2023-08-15 PROCEDURE — 250N000011 HC RX IP 250 OP 636: Mod: JZ | Performed by: STUDENT IN AN ORGANIZED HEALTH CARE EDUCATION/TRAINING PROGRAM

## 2023-08-15 PROCEDURE — 74178 CT ABD&PLV WO CNTR FLWD CNTR: CPT

## 2023-08-15 PROCEDURE — 82565 ASSAY OF CREATININE: CPT

## 2023-08-15 PROCEDURE — G0463 HOSPITAL OUTPT CLINIC VISIT: HCPCS | Performed by: STUDENT IN AN ORGANIZED HEALTH CARE EDUCATION/TRAINING PROGRAM

## 2023-08-15 RX ORDER — IOPAMIDOL 755 MG/ML
90 INJECTION, SOLUTION INTRAVASCULAR ONCE
Status: COMPLETED | OUTPATIENT
Start: 2023-08-15 | End: 2023-08-15

## 2023-08-15 RX ADMIN — IOPAMIDOL 90 ML: 755 INJECTION, SOLUTION INTRAVENOUS at 16:33

## 2023-08-15 ASSESSMENT — PAIN SCALES - GENERAL: PAINLEVEL: NO PAIN (0)

## 2023-08-15 NOTE — PROGRESS NOTES
Chief Complaint:   Microscopic hematuria  Left renal mass  Left adrenal adenoma           Consult or Referral:     Mr. Mohamud Mondragon is a 73 year old female seen at the request of Dr. Malone.         History of Present Illness:     Mohamud Mondragon is a 73 year old female being seen for evaluation of microscopic hematuria and concerns for left renal mass.  Duration of problem: Few weeks  Previous treatments: None     accompanied by her spouse  Reviewed previous notes from Dr. Malone    Mohamud was evaluated by her primary care for back pain with a CT abdomen  She has also concerns of problem trace blood seen in her urine on the last 2 occasions  She has not seen any gross hematuria               Past Medical History:     Past Medical History:   Diagnosis Date    Allergy to dogs     - 1/2013: no allergy symptoms and not using antihistamines    Chickenpox childhood    h/o chickenpox in childhood per patient report    Closed fracture of head of left radius 08/01/2013    - 8/26/13:  fell on her; L radial head fracture; Dr Engel of [facility] recommended conservative management (also had left hip fracture s/p surgical repair by Dr Magana of [facility])    Closed fracture of unspecified part of neck of femur 08/01/2013    - 8/26/13 L hip fracture (L proximal femur),  fell on her (also , L radial fracture, conservatively managed); - 8/27/13 Dr Sonu Magana ([facility]), [facility]: ORIF (intramedullary fixation) L hip    H/O colonoscopy 06/01/2016    normal, repeat in 10 years = 2026    H/O echocardiogram 05/01/2007    - 5/4/07 TTE: NORMAL, EF60% (done because of vertigo/dizzy symptoms)       History of hepatitis A vaccination     Completed Hep A series per patient report documented in Dr. Carvalho's notes 8/12/05; patient declines testing for immunity 2/13/10//sds    History of hepatitis B vaccination     Completed Hep B series per patient report documented in Dr. Carvalho's notes 8/12/05; patient declines  testing for immunity 2/13/10//sds    History of Holter monitoring 04/01/2009    - 4/1/09 NORMAL 24 hour holter monitor interpreted by Dr. Crouch at [place]         Hx of low back pain 03/01/2013    - 3/16/13 NER low back pain after shoveing snow and kick-boxing video; rxd vicodin and naproxen    Hyperbilirubinemia 01/01/2005    Mild elevation of bilrubin dating back to 2005. Total bili around 1.1. Unclear etiology. 2016&17=1.6ish         Lump or mass in breast 09/01/2007    - 9/20/07 mammo & u/s: ACR2-benign.  U/s = left upper outer quadrant at 1:30 4 cm from nipple demonstrates fibrous ridge; stable, no significant change since 2005; - sister has h/o breast CA       Menarche 14 y/o    Menarche 14 y/o. Regular cycles q 24-26 days, heavy flow 7 days, last period 1987 (then had BARBARA for menorrhagia)    Meniere's disease 11/01/2009    h/o Meniere's Disease. Referred to [facility] by Dr. Knox 11/2009. Seen by [facility] 11/2008. Normal carotid u/s & TTE 5/2007           Menopause mid 50s y/o?    last period 1987 at 48 y/o (BARBARA for menorrhagia), then menopausal symptoms around 55 y/o    Osteopenia 01/01/2019    no bone density change from 2016-19.    Pneumonia, community acquired 10/01/2007    - 10/2007: LLL Pneumonia (CAP), uncomplicated    Radiological examination, not elsewhere classified 05/01/2007    NORMAL carotid u/s 5/4/07 at [place]. Done because of symptoms of vertigo/dizziness    Rash 03/01/2019    biopsied by derm - who said could be lupus, but labs did not correlate.    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 Performed by Dr. Rhodes at [facility] VAGINAL HYSTERECTOMY,UTERUS 250 GMS/<  01/01/1987    - 1987 Dr Gabriel Weiner, [facility] [place]: Vaginal hysterectomy for menorrhagia; cervix removed.         right hip replaced Right 2022    right hip replaced            Medications     Current Outpatient Medications   Medication    amitriptyline (ELAVIL) 10 MG tablet    carboxymethylcellulose (REFRESH PLUS) 0.5 % SOLN ophthalmic solution    clindamycin (CLEOCIN) 300 MG capsule    clobetasol (TEMOVATE) 0.05 % external ointment    estradiol (ESTRACE) 0.1 MG/GM vaginal cream    fish oil-omega-3 fatty acids 1000 MG capsule    melatonin 5 MG tablet    Multiple Vitamins-Minerals (PRESERVISION AREDS 2) CAPS    Turmeric 500 MG CAPS    vitamin D3 (CHOLECALCIFEROL) 50 mcg (2000 units) tablet    zinc 50 MG TABS     No current facility-administered medications for this visit.            Family History:     Family History   Problem Relation Age of Onset    Heart Disease Mother 92        mother/father CAD 92-94 y/o    Diabetes Type 2  Mother         mother DM2    Hypertension Mother         mother htn    Heart Disease Father 94        mother/father CAD 92-94 y/o    Hyperlipidemia Father         father high lipids, treated with Lipitor    Atrial fibrillation Father         father a fib    Thyroid Disease Father     Breast Cancer Sister 59        cured!! type of cancer not known (non-estrogen positive) stage 4    Testicular cancer Brother 30        Mohamud's twin    Thyroid Cancer Niece     Ovarian Cancer No family hx of             Social History:     Social History     Socioeconomic History    Marital status:      Spouse name: Not on file    Number of children: 3    Years of education: BS & MA    Highest education level: Not on file   Occupational History    Not on file   Tobacco Use    Smoking status: Never     Passive  "exposure: Never    Smokeless tobacco: Never    Tobacco comments:     No passive smoke exposure   Vaping Use    Vaping Use: Never used   Substance and Sexual Activity    Alcohol use: No    Drug use: No    Sexual activity: Yes     Partners: Male     Birth control/protection: Post-menopausal     Comment: 1 partner   Other Topics Concern    Not on file   Social History Narrative    Notes per PCP, 12/2018    HOME ENVIRONMENT:  - Lives with  \"Surinder\", dogs, house with stairs, [place]     MARITAL HISTORY:  -  to Gabriel Mondragon since __    EDUCATION:  - 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 Attended [employer] in [place], [place]. Traveled to [facility] in x4 (1996) and to [place] x2 ([id number])     Adventism: Zoroastrian     Social Determinants of Health     Financial Resource Strain: Not on file   Food Insecurity: Not on file   Transportation Needs: Not on file   Physical Activity: Not on file   Stress: Not on file   Social Connections: Not on file   Intimate Partner Violence: Not on file   Housing Stability: Not on file            Allergies:   Erythromycin and Penicillins         Review of Systems:  From intake questionnaire     Skin: negative  Eyes: negative  Ears/Nose/Throat: negative  Respiratory: No shortness of breath, dyspnea on exertion, cough, or hemoptysis  Cardiovascular: No chest pain or palpitations  Gastrointestinal: negative; no nausea/vomiting, constipation or diarrhea  Genitourinary: as per HPI  Musculoskeletal: negative  Neurologic: negative  Psychiatric: negative  Hematologic/Lymphatic/Immunologic: negative  Endocrine: negative         Physical Exam:     Patient is a 73 year old  female   Vitals: Blood pressure (!) 167/77, pulse 73, resp. rate 18, height 1.753 m (5' 9\"), weight 73.4 kg (161 lb 12.8 oz), SpO2 99 %, " not currently breastfeeding.  Constitutional: Body mass index is 23.89 kg/m .  Alert, no acute distress, oriented, conversant  Eyes: no scleral icterus; extraocular muscles intact, moist conjunctivae  Neck: trachea midline, no thyromegaly  Ears/nose/mouth: throat/mouth:normal, good dentition  Respiratory: no respiratory distress, or pursed lip breathing  Cardiovascular: pulses strong and intact; no obvious jugular venous distension present  Gastrointestinal: soft, nontender, no organomegaly or masses,   Lymphatics: No inguinal adenopathy  Musculoskeletal: extremities normal, no peripheral edema  Skin: no suspicious lesions or rashes  Neuro: Alert, oriented, speech and mentation normal  Psych: affect and mood normal, alert and oriented to person, place and time  Gait: Normal  : deferred      Labs and Pathology:    The following labs were reviewed by me and discussed with the patient:  UA: Abnormal: Trace blood seen in 2 occasions, microscopy simultaneously done does not show any RBCs in the urine  2 years prior microscopic hematuria with 2-5 RBCs were seen  Significant for   Lab Results   Component Value Date    CR 0.78 10/26/2022    CR 0.77 05/18/2022    CR 0.75 04/06/2022    CR 0.78 10/22/2021    CR 0.77 08/03/2020    CR 0.86 04/16/2019    CR 0.80 04/13/2018     No results found for: PSA          Imaging:    The following imaging exams were independently viewed and interpreted by me and discussed with patient:  CT Scan Abd/Pelvis: Abnormal:   7 mm adrenal adenoma  5 mm cystic lesion on the medial aspect of the left kidney query cyst David mass               Assessment and Plan:     Renal mass of unknown nature  Small renal mass possibly cyst  Prior CT was without contrast  Recommending CT with contrast and urogram with concerns of prior microscopic blood in the urine  - Adult Nephrology  Referral  - CT Urogram wo & w Contrast; Future    Microscopic hematuria  1 episodes of RBCs were seen 2 years  prior  Subsequent UA is have been negative for RBCs but positive for trace blood on microscopy  This could be a false positive  I do not think needs further evaluation with cystoscopy at this point, however, would be getting a CT abdomen pelvis with contrast and subsequently a urogram in view of prior microscopic hematuria and a renal lesion    Adenoma of left adrenal gland  Adenoma by size and CT criteria  Will probably not need any follow-up follow-up      Plan:  CT urogram  Update results on MyChart    Orders  Orders Placed This Encounter   Procedures    CT Urogram wo & w Contrast       Jose Manuel Freire MD  [facility]      ==========================    Additional Billing and Coding Information:  Review of external notes as documented above   Review of the result(s) of each unique test - CT abdomen pelvis UA, creatinine    Independent interpretation of a test performed by another physician/other qualified health care professional (not separately reported) -       Discussion of management or test interpretation with external physician/other qualified healthcare professional/appropriate source -           15 minutes spent by me on the date of the encounter doing chart review, review of test results, interpretation of tests, patient visit, and documentation and discussion with family    ==========================

## 2023-08-15 NOTE — PROGRESS NOTES
"Oncology Rooming Note    August 15, 2023 9:57 AM   Mohamud Mondragon is a 73 year old female who presents for:    Chief Complaint   Patient presents with    Urology     Kidney      Initial Vitals: BP (!) 167/77   Pulse 73   Resp 18   Ht 1.753 m (5' 9\")   Wt 73.4 kg (161 lb 12.8 oz)   SpO2 99%   BMI 23.89 kg/m   Estimated body mass index is 23.89 kg/m  as calculated from the following:    Height as of this encounter: 1.753 m (5' 9\").    Weight as of this encounter: 73.4 kg (161 lb 12.8 oz). Body surface area is 1.89 meters squared.  No Pain (0) Comment: Data Unavailable   No LMP recorded. Patient is postmenopausal.  Allergies reviewed: Yes  Medications reviewed: Yes    Medications: Medication refills not needed today.  Pharmacy name entered into [employer]: [id number] - [place], [place] - [address] [place] [address] E    Clinical concerns: None       Kiki Johnston LPN             "

## 2023-08-15 NOTE — LETTER
"    8/15/2023         RE: Mohamud Mondragon  [address] [facility] [address]        Dear Colleague,    Thank you for referring your patient, Mohamud Mondragon, to the [facility]. Please see a copy of my visit note below.    Oncology Rooming Note    August 15, 2023 9:57 AM   Mohamud Mondragon is a 73 year old female who presents for:    Chief Complaint   Patient presents with     Urology     Kidney      Initial Vitals: BP (!) 167/77   Pulse 73   Resp 18   Ht 1.753 m (5' 9\")   Wt 73.4 kg (161 lb 12.8 oz)   SpO2 99%   BMI 23.89 kg/m   Estimated body mass index is 23.89 kg/m  as calculated from the following:    Height as of this encounter: 1.753 m (5' 9\").    Weight as of this encounter: 73.4 kg (161 lb 12.8 oz). Body surface area is 1.89 meters squared.  No Pain (0) Comment: Data Unavailable   No LMP recorded. Patient is postmenopausal.  Allergies reviewed: Yes  Medications reviewed: Yes    Medications: Medication refills not needed today.  Pharmacy name entered into [employer]: [id number] - [place], [place] - [address] [place] [address] E    Clinical concerns: None       Kiki Johnston LPN                     Chief Complaint:   Microscopic hematuria  Left renal mass  Left adrenal adenoma           Consult or Referral:     Mr. Mohamud Mondragon is a 73 year old female seen at the request of Dr. Malone.         History of Present Illness:     Mohamud Mondragon is a 73 year old female being seen for evaluation of microscopic hematuria and concerns for left renal mass.  Duration of problem: Few weeks  Previous treatments: None     accompanied by her spouse  Reviewed previous notes from Dr. Malone    Mohamud was evaluated by her primary care for back pain with a CT abdomen  She has also concerns of problem trace blood seen in her urine on the last 2 occasions  She has not seen any gross hematuria               Past Medical History:     Past Medical History:   Diagnosis Date     Allergy to dogs     - " 1/2013: no allergy symptoms and not using antihistamines     Chickenpox childhood    h/o chickenpox in childhood per patient report     Closed fracture of head of left radius 08/01/2013    - 8/26/13:  fell on her; L radial head fracture; Dr Engel of [facility] recommended conservative management (also had left hip fracture s/p surgical repair by Dr Magana of [facility])     Closed fracture of unspecified part of neck of femur 08/01/2013    - 8/26/13 L hip fracture (L proximal femur),  fell on her (also , L radial fracture, conservatively managed); - 8/27/13 Dr Sonu Magana ([facility]), [facility]: ORIF (intramedullary fixation) L hip     H/O colonoscopy 06/01/2016    normal, repeat in 10 years = 2026     H/O echocardiogram 05/01/2007    - 5/4/07 TTE: NORMAL, EF60% (done because of vertigo/dizzy symptoms)        History of hepatitis A vaccination     Completed Hep A series per patient report documented in Dr. Carvalho's notes 8/12/05; patient declines testing for immunity 2/13/10//sds     History of hepatitis B vaccination     Completed Hep B series per patient report documented in Dr. Carvalho's notes 8/12/05; patient declines testing for immunity 2/13/10//sds     History of Holter monitoring 04/01/2009    - 4/1/09 NORMAL 24 hour holter monitor interpreted by Dr. Crouch at [facility] of low back pain 03/01/2013    - 3/16/13 NER low back pain after shoveing snow and kick-boxing video; rxd vicodin and naproxen     Hyperbilirubinemia 01/01/2005    Mild elevation of bilrubin dating back to 2005. Total bili around 1.1. Unclear etiology. 2016&17=1.6ish          Lump or mass in breast 09/01/2007    - 9/20/07 mammo & u/s: ACR2-benign.  U/s = left upper outer quadrant at 1:30 4 cm from nipple demonstrates fibrous ridge; stable, no significant change since 2005; - sister has h/o breast CA        Menarche 14 y/o    Menarche 14 y/o. Regular cycles q 24-26 days, heavy flow 7 days, last period 1987 (then  had BARBARA for menorrhagia)     Meniere's disease 11/01/2009    h/o Meniere's Disease. Referred to [facility] by Dr. Knox 11/2009. Seen by [facility] 11/2008. Normal carotid u/s & TTE 5/2007            Menopause mid 50s y/o?    last period 1987 at 46 y/o (BARBARA for menorrhagia), then menopausal symptoms around 55 y/o     Osteopenia 01/01/2019    no bone density change from 2016-19.     Pneumonia, community acquired 10/01/2007    - 10/2007: LLL Pneumonia (CAP), uncomplicated     Radiological examination, not elsewhere classified 05/01/2007    NORMAL carotid u/s 5/4/07 at [facility]. Done because of symptoms of vertigo/dizziness     Rash 03/01/2019    biopsied by derm - who said could be lupus, but labs did not correlate.     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 Performed by Dr. Rhodes at [facility]          TX VAGINAL HYSTERECTOMY,UTERUS 250 GMS/<  01/01/1987    [facility] Vaginal hysterectomy for menorrhagia; cervix removed.          right hip replaced Right 2022    right hip replaced            Medications     Current Outpatient Medications   Medication     amitriptyline (ELAVIL) 10 MG tablet     carboxymethylcellulose (REFRESH PLUS) 0.5 % SOLN ophthalmic solution     clindamycin (CLEOCIN) 300 MG capsule     clobetasol (TEMOVATE) 0.05 % external ointment     estradiol (ESTRACE) 0.1 MG/GM vaginal cream     fish oil-omega-3 fatty acids 1000 MG capsule     melatonin 5 MG tablet     Multiple  "Vitamins-Minerals (PRESERVISION AREDS 2) CAPS     Turmeric 500 MG CAPS     vitamin D3 (CHOLECALCIFEROL) 50 mcg (2000 units) tablet     zinc 50 MG TABS     No current facility-administered medications for this visit.            Family History:     Family History   Problem Relation Age of Onset     Heart Disease Mother 92        mother/father CAD 92-92 y/o     Diabetes Type 2  Mother         mother DM2     Hypertension Mother         mother htn     Heart Disease Father 94        mother/father CAD 92-92 y/o     Hyperlipidemia Father         father high lipids, treated with Lipitor     Atrial fibrillation Father         father a fib     Thyroid Disease Father      Breast Cancer Sister 59        cured!! type of cancer not known (non-estrogen positive) stage 4     Testicular cancer Brother 30        Mohamud's twin     Thyroid Cancer Niece      Ovarian Cancer No family hx of             Social History:     Social History     Socioeconomic History     Marital status:      Spouse name: Not on file     Number of children: 3     Years of education: BS & MA     Highest education level: Not on file   Occupational History     Not on file   Tobacco Use     Smoking status: Never     Passive exposure: Never     Smokeless tobacco: Never     Tobacco comments:     No passive smoke exposure   Vaping Use     Vaping Use: Never used   Substance and Sexual Activity     Alcohol use: No     Drug use: No     Sexual activity: Yes     Partners: Male     Birth control/protection: Post-menopausal     Comment: 1 partner   Other Topics Concern     Not on file   Social History Narrative    Notes per PCP, 12/2018    HOME ENVIRONMENT:  - Lives with  \"Surinder\", dogs, house with stairs, [place]     MARITAL HISTORY:  -  to Gabriel Mondragon since __    EDUCATION:  - BS Math at [email or web address] ([place], [place])  - Masters Education at [employer]    HOBBIES/LEISURE ACTIVITIES:  - Reading, sewing/crafts, outdoor activities biking & swimming      " "PERSONAL HISTORY:  - Born & raised [place], [place]. Attended [employer] in [place], [place]. Traveled to [place] in x4 (1996) and to [place] x2 ([id number])     Hindu: Mormon     Social Determinants of Health     Financial Resource Strain: Not on file   Food Insecurity: Not on file   Transportation Needs: Not on file   Physical Activity: Not on file   Stress: Not on file   Social Connections: Not on file   Intimate Partner Violence: Not on file   Housing Stability: Not on file            Allergies:   Erythromycin and Penicillins         Review of Systems:  From intake questionnaire     Skin: negative  Eyes: negative  Ears/Nose/Throat: negative  Respiratory: No shortness of breath, dyspnea on exertion, cough, or hemoptysis  Cardiovascular: No chest pain or palpitations  Gastrointestinal: negative; no nausea/vomiting, constipation or diarrhea  Genitourinary: as per HPI  Musculoskeletal: negative  Neurologic: negative  Psychiatric: negative  Hematologic/Lymphatic/Immunologic: negative  Endocrine: negative         Physical Exam:     Patient is a 73 year old  female   Vitals: Blood pressure (!) 167/77, pulse 73, resp. rate 18, height 1.753 m (5' 9\"), weight 73.4 kg (161 lb 12.8 oz), SpO2 99 %, not currently breastfeeding.  Constitutional: Body mass index is 23.89 kg/m .  Alert, no acute distress, oriented, conversant  Eyes: no scleral icterus; extraocular muscles intact, moist conjunctivae  Neck: trachea midline, no thyromegaly  Ears/nose/mouth: throat/mouth:normal, good dentition  Respiratory: no respiratory distress, or pursed lip breathing  Cardiovascular: pulses strong and intact; no obvious jugular venous distension present  Gastrointestinal: soft, nontender, no organomegaly or masses,   Lymphatics: No inguinal adenopathy  Musculoskeletal: extremities normal, no peripheral edema  Skin: no suspicious lesions or rashes  Neuro: Alert, oriented, speech and mentation normal  Psych: affect and mood normal, alert and " oriented to person, place and time  Gait: Normal  : deferred      Labs and Pathology:    The following labs were reviewed by me and discussed with the patient:  UA: Abnormal: Trace blood seen in 2 occasions, microscopy simultaneously done does not show any RBCs in the urine  2 years prior microscopic hematuria with 2-5 RBCs were seen  Significant for   Lab Results   Component Value Date    CR 0.78 10/26/2022    CR 0.77 05/18/2022    CR 0.75 04/06/2022    CR 0.78 10/22/2021    CR 0.77 08/03/2020    CR 0.86 04/16/2019    CR 0.80 04/13/2018     No results found for: PSA          Imaging:    The following imaging exams were independently viewed and interpreted by me and discussed with patient:  CT Scan Abd/Pelvis: Abnormal:   7 mm adrenal adenoma  5 mm cystic lesion on the medial aspect of the left kidney query cyst Advid mass               Assessment and Plan:     Renal mass of unknown nature  Small renal mass possibly cyst  Prior CT was without contrast  Recommending CT with contrast and urogram with concerns of prior microscopic blood in the urine  - Adult Nephrology  Referral  - CT Urogram wo & w Contrast; Future    Microscopic hematuria  1 episodes of RBCs were seen 2 years prior  Subsequent UA is have been negative for RBCs but positive for trace blood on microscopy  This could be a false positive  I do not think needs further evaluation with cystoscopy at this point, however, would be getting a CT abdomen pelvis with contrast and subsequently a urogram in view of prior microscopic hematuria and a renal lesion    Adenoma of left adrenal gland  Adenoma by size and CT criteria  Will probably not need any follow-up follow-up      Plan:  CT urogram  Update results on MyChart    Orders  Orders Placed This Encounter   Procedures     CT Urogram wo & w Contrast       Jose Manuel Freire MD  [facility]      ==========================    Additional Billing and Coding  Information:  Review of external notes as documented above   Review of the result(s) of each unique test - CT abdomen pelvis UA, creatinine    Independent interpretation of a test performed by another physician/other qualified health care professional (not separately reported) -       Discussion of management or test interpretation with external physician/other qualified healthcare professional/appropriate source -           15 minutes spent by me on the date of the encounter doing chart review, review of test results, interpretation of tests, patient visit, and documentation and discussion with family    ==========================      Again, thank you for allowing me to participate in the care of your patient.        Sincerely,        Jose Manuel Freire MD

## 2023-11-06 ASSESSMENT — ENCOUNTER SYMPTOMS
CHILLS: 0
SORE THROAT: 0
SHORTNESS OF BREATH: 0
ABDOMINAL PAIN: 0
FREQUENCY: 1
PARESTHESIAS: 0
HEARTBURN: 0
EYE PAIN: 0
NAUSEA: 0
NERVOUS/ANXIOUS: 0
BREAST MASS: 0
DIARRHEA: 0
WEAKNESS: 0
FEVER: 0
HEMATURIA: 0
HEADACHES: 0
CONSTIPATION: 0
DYSURIA: 0
DIZZINESS: 0
MYALGIAS: 0
COUGH: 0
HEMATOCHEZIA: 0
PALPITATIONS: 0
ARTHRALGIAS: 0
JOINT SWELLING: 0

## 2023-11-06 ASSESSMENT — ACTIVITIES OF DAILY LIVING (ADL): CURRENT_FUNCTION: NO ASSISTANCE NEEDED

## 2023-11-07 ENCOUNTER — OFFICE VISIT (OUTPATIENT)
Dept: FAMILY MEDICINE | Facility: CLINIC | Age: 73
End: 2023-11-07
Payer: COMMERCIAL

## 2023-11-07 VITALS
BODY MASS INDEX: 24.7 KG/M2 | WEIGHT: 166.75 LBS | TEMPERATURE: 97.6 F | SYSTOLIC BLOOD PRESSURE: 144 MMHG | RESPIRATION RATE: 16 BRPM | HEIGHT: 69 IN | DIASTOLIC BLOOD PRESSURE: 68 MMHG | OXYGEN SATURATION: 100 % | HEART RATE: 78 BPM

## 2023-11-07 DIAGNOSIS — E78.00 PURE HYPERCHOLESTEROLEMIA: ICD-10-CM

## 2023-11-07 DIAGNOSIS — R21 RASH: ICD-10-CM

## 2023-11-07 DIAGNOSIS — Z76.0 ENCOUNTER FOR MEDICATION REFILL: ICD-10-CM

## 2023-11-07 DIAGNOSIS — F51.01 PRIMARY INSOMNIA: ICD-10-CM

## 2023-11-07 DIAGNOSIS — N95.2 POSTMENOPAUSAL ATROPHIC VAGINITIS: ICD-10-CM

## 2023-11-07 DIAGNOSIS — N39.46 MIXED STRESS AND URGE URINARY INCONTINENCE: ICD-10-CM

## 2023-11-07 DIAGNOSIS — Z00.00 ENCOUNTER FOR ANNUAL WELLNESS VISIT (AWV) IN MEDICARE PATIENT: Primary | ICD-10-CM

## 2023-11-07 DIAGNOSIS — Z29.11 NEED FOR VACCINATION AGAINST RESPIRATORY SYNCYTIAL VIRUS: ICD-10-CM

## 2023-11-07 DIAGNOSIS — Z23 NEED FOR VACCINATION: ICD-10-CM

## 2023-11-07 DIAGNOSIS — Z29.89 INDICATION PRESENT FOR ENDOCARDITIS PROPHYLAXIS: ICD-10-CM

## 2023-11-07 PROCEDURE — 80061 LIPID PANEL: CPT | Performed by: FAMILY MEDICINE

## 2023-11-07 PROCEDURE — 91320 SARSCV2 VAC 30MCG TRS-SUC IM: CPT | Performed by: FAMILY MEDICINE

## 2023-11-07 PROCEDURE — 99213 OFFICE O/P EST LOW 20 MIN: CPT | Mod: 25 | Performed by: FAMILY MEDICINE

## 2023-11-07 PROCEDURE — 36415 COLL VENOUS BLD VENIPUNCTURE: CPT | Performed by: FAMILY MEDICINE

## 2023-11-07 PROCEDURE — 90480 ADMN SARSCOV2 VAC 1/ONLY CMP: CPT | Performed by: FAMILY MEDICINE

## 2023-11-07 PROCEDURE — G0008 ADMIN INFLUENZA VIRUS VAC: HCPCS | Performed by: FAMILY MEDICINE

## 2023-11-07 PROCEDURE — G0439 PPPS, SUBSEQ VISIT: HCPCS | Performed by: FAMILY MEDICINE

## 2023-11-07 PROCEDURE — 83695 ASSAY OF LIPOPROTEIN(A): CPT | Performed by: FAMILY MEDICINE

## 2023-11-07 PROCEDURE — 90662 IIV NO PRSV INCREASED AG IM: CPT | Performed by: FAMILY MEDICINE

## 2023-11-07 RX ORDER — RESPIRATORY SYNCYTIAL VIRUS VACCINE 120MCG/0.5
0.5 KIT INTRAMUSCULAR ONCE
Qty: 1 EACH | Refills: 0 | Status: SHIPPED | OUTPATIENT
Start: 2023-11-07 | End: 2023-11-07

## 2023-11-07 ASSESSMENT — ENCOUNTER SYMPTOMS
DIZZINESS: 0
HEARTBURN: 0
MYALGIAS: 0
COUGH: 0
BREAST MASS: 0
HEMATOCHEZIA: 0
HEADACHES: 0
DYSURIA: 0
HEMATURIA: 0
NAUSEA: 0
ARTHRALGIAS: 0
PARESTHESIAS: 0
NERVOUS/ANXIOUS: 0
CONSTIPATION: 0
WEAKNESS: 0
EYE PAIN: 0
SHORTNESS OF BREATH: 0
PALPITATIONS: 0
SORE THROAT: 0
DIARRHEA: 0
ABDOMINAL PAIN: 0
FREQUENCY: 1
FEVER: 0
JOINT SWELLING: 0
CHILLS: 0

## 2023-11-07 ASSESSMENT — ACTIVITIES OF DAILY LIVING (ADL): CURRENT_FUNCTION: NO ASSISTANCE NEEDED

## 2023-11-07 NOTE — PROGRESS NOTES
"SUBJECTIVE:   Mohamud is a 73 year old who presents for Preventive Visit.      11/7/2023     1:47 PM   Additional Questions   Roomed by Sybil DELUCA     Are you in the first 12 months of your Medicare coverage?  No    Healthy Habits:     In general, how would you rate your overall health?  Good    Frequency of exercise:  6-7 days/week    Duration of exercise:  45-60 minutes    Do you usually eat at least 4 servings of fruit and vegetables a day, include whole grains    & fiber and avoid regularly eating high fat or \"junk\" foods?  Yes    Taking medications regularly:  Yes    Medication side effects:  None    Ability to successfully perform activities of daily living:  No assistance needed    Home Safety:  Throw rugs in the hallway and lack of grab bars in the bathroom    Hearing Impairment:  No hearing concerns    In the past 6 months, have you been bothered by leaking of urine? Yes    In general, how would you rate your overall mental or emotional health?  Good    Additional concerns today:  Yes    Just took a trip to see friends in [place] and [place].It was great and is a bit of an adjustment being back.    BP record from home - over a couple weeks - all normal!    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    Low back pain - has been worse recently    Hot flashes - a few times in [place] - faintish; irregularly, short. 1 per week. For about 6 mon.    Middle finger tendon of right hand has a bump on it. Is that a concern?    Today's PHQ-2 Score:       11/6/2023     5:18 PM   PHQ-2 ( 1999 Pf[employer])   Q1: Little interest or pleasure in doing things 0   Q2: Feeling down, depressed or hopeless 0   PHQ-2 Score 0   Q1: Little interest or pleasure in doing things Not at all   Q2: Feeling down, depressed or hopeless Not at all   PHQ-2 Score 0       Have you ever done Advance Care Planning? (For example, a Health Directive, POLST, or a discussion with a " medical provider or your loved ones about your wishes): Yes, advance care planning is on file.       Fall risk  Fallen 2 or more times in the past year?: No  Any fall with injury in the past year?: Yes    Cognitive Screening   1) Repeat 3 items (Leader, Season, Table)    2) Clock draw: NORMAL  3) 3 item recall: Recalls 3 objects  Results: 3 items recalled: COGNITIVE IMPAIRMENT LESS LIKELY    Mini-CogTM Copyright KESHAWN Worley. Licensed by the author for use in [facility]; reprinted with permission (jordon@[facility]). All rights reserved.      Do you have sleep apnea, excessive snoring or daytime drowsiness? : no    Reviewed and updated as needed this visit by clinical staff   Tobacco  Allergies  Meds              Reviewed and updated as needed this visit by Provider                 Social History     Tobacco Use     Smoking status: Never     Passive exposure: Never     Smokeless tobacco: Never     Tobacco comments:     No passive smoke exposure   Substance Use Topics     Alcohol use: No             11/6/2023     5:14 PM   Alcohol Use   Prescreen: >3 drinks/day or >7 drinks/week? No     Do you have a current opioid prescription? No  Do you use any other controlled substances or medications that are not prescribed by a provider? None    Current providers sharing in care for this patient include:   Patient Care Team:  Chhaya Malone MD as PCP - General (Family Medicine)  Chhaya Malone MD as Assigned PCP  Jose Manuel Freire MD as MD (Urology)  Jose Manuel Freire MD as Assigned Surgical Provider    The following health maintenance items are reviewed in Epic and correct as of today:  Health Maintenance   Topic Date Due     RSV VACCINE (Pregnancy & 60+) (1 - 1-dose 60+ series) Never done     Pneumococcal Vaccine: 65+ Years (3 - PPSV23 or PCV20) 03/11/2020     INFLUENZA VACCINE (1) 09/01/2023     COVID-19 Vaccine (4 - 2023-24 season) 09/01/2023     MEDICARE ANNUAL WELLNESS VISIT  11/01/2023     ANNUAL  "REVIEW OF HM ORDERS  04/04/2024     FALL RISK ASSESSMENT  11/07/2024     MAMMO SCREENING  12/14/2024     COLORECTAL CANCER SCREENING  06/03/2026     DTAP/TDAP/TD IMMUNIZATION (4 - Td or Tdap) 06/19/2026     ADVANCE CARE PLANNING  11/03/2027     LIPID  04/06/2028     DEXA  05/20/2034     HEPATITIS C SCREENING  Completed     PHQ-2 (once per calendar year)  Completed     ZOSTER IMMUNIZATION  Completed     IPV IMMUNIZATION  Aged Out     HPV IMMUNIZATION  Aged Out     MENINGITIS IMMUNIZATION  Aged Out     RSV MONOCLONAL ANTIBODY  Aged Out           Review of Systems   Constitutional:  Negative for chills and fever.   HENT:  Negative for congestion, ear pain, hearing loss and sore throat.    Eyes:  Negative for pain and visual disturbance.   Respiratory:  Negative for cough and shortness of breath.    Cardiovascular:  Negative for chest pain, palpitations and peripheral edema.   Gastrointestinal:  Negative for abdominal pain, constipation, diarrhea, heartburn, hematochezia and nausea.   Breasts:  Negative for tenderness, breast mass and discharge.   Genitourinary:  Positive for frequency and urgency. Negative for dysuria, genital sores, hematuria, pelvic pain, vaginal bleeding and vaginal discharge.   Musculoskeletal:  Negative for arthralgias, joint swelling and myalgias.   Skin:  Negative for rash.   Neurological:  Negative for dizziness, weakness, headaches and paresthesias.   Psychiatric/Behavioral:  Negative for mood changes. The patient is not nervous/anxious.        OBJECTIVE:   BP (!) 150/80 (BP Location: Left arm, Patient Position: Sitting, Cuff Size: Adult Regular)   Pulse 86   Temp 97.6  F (36.4  C) (Oral)   Resp 16   Ht 1.753 m (5' 9\")   Wt 75.6 kg (166 lb 12 oz)   SpO2 100%   BMI 24.62 kg/m   Estimated body mass index is 24.62 kg/m  as calculated from the following:    Height as of this encounter: 1.753 m (5' 9\").    Weight as of this encounter: 75.6 kg (166 lb 12 oz).  Physical Exam  GENERAL: healthy, " alert and no distress  EYES: Eyes grossly normal to inspection, PERRL and conjunctivae and sclerae normal  NECK: no adenopathy, no asymmetry, masses, or scars and thyroid normal to palpation  RESP: lungs clear to auscultation - no rales, rhonchi or wheezes  CV: regular rate and rhythm, normal S1 S2, no S3 or S4, no murmur, click or rub, no peripheral edema and peripheral pulses strong  MS: no gross musculoskeletal defects noted, no edema  PSYCH: mentation appears normal, affect normal/bright    Diagnostic Test Results:  Labs reviewed in Epic  Results for orders placed or performed in visit on 11/07/23   Lipid panel reflex to direct LDL Fasting     Status: Abnormal   Result Value Ref Range    Cholesterol 234 (H) <200 mg/dL    Triglycerides 65 <150 mg/dL    Direct Measure HDL 88 >=50 mg/dL    LDL Cholesterol Calculated 133 (H) <=100 mg/dL    Non HDL Cholesterol 146 (H) <130 mg/dL    Narrative    Cholesterol  Desirable:  <200 mg/dL    Triglycerides  Normal:  Less than 150 mg/dL  Borderline High:  150-199 mg/dL  High:  200-499 mg/dL  Very High:  Greater than or equal to 500 mg/dL    Direct Measure HDL  Female:  Greater than or equal to 50 mg/dL   Male:  Greater than or equal to 40 mg/dL    LDL Cholesterol  Desirable:  <100mg/dL  Above Desirable:  100-129 mg/dL   Borderline High:  130-159 mg/dL   High:  160-189 mg/dL   Very High:  >= 190 mg/dL    Non HDL Cholesterol  Desirable:  130 mg/dL  Above Desirable:  130-159 mg/dL  Borderline High:  160-189 mg/dL  High:  190-219 mg/dL  Very High:  Greater than or equal to 220 mg/dL   Lipoprotein (a)     Status: Normal   Result Value Ref Range    Lipoprotein (a) <6 <30 mg/dL       ASSESSMENT / PLAN:   Annual Wellness Visit - completed. She is doing exceptionally well!    (E78.00) Pure hypercholesterolemia  (primary encounter diagnosis)  Comment: she wanted to test again - and it is helpful that we did.  which is increased a bit, but then we did lipoprotein a and that was  less than 6. It is great she does not have that additional risk factor  Plan: Lipid panel reflex to direct LDL Fasting,         Lipoprotein (a)    (Z29.11) Need for vaccination against respiratory syncytial virus  Comment: will give in future  Plan: respiratory syncytial virus vaccine, bivalent         (ABRYSVO) injection    (Z23) Need for vaccination  Comment: gave flu and covid  Plan: INFLUENZA VACCINE 65+ (FLUZONE HD), COVID-19         12+ (2023-24) (PFIZER), CANCELED: Pneumococcal         20 Valent Conjugate (PCV20)    (N39.46) Mixed stress and urge urinary incontinence  Comment: she wants to do something about this problem and agrees to try pelvic floor PT. I am glad because I think it will help and I really hope she can avoid surgery.  Plan: Physical Therapy Referral        COUNSELING:  Reviewed preventive health counseling, as reflected in patient instructions       Regular exercise       Healthy diet/nutrition       Vision screening       Hearing screening       Dental care       Bladder control       Fall risk prevention       Immunizations  Vaccinated for: Covid-19 and Influenza           Osteoporosis prevention/bone health       Advanced Planning     Approximately 30min on AWV and an additional 20min on back pain, h/o depression, sleep, incontinence and hot flashes.    She reports that she has never smoked. She has never been exposed to tobacco smoke. She has never used smokeless tobacco.      Appropriate preventive services were discussed with this patient, including applicable screening as appropriate for fall prevention, nutrition, physical activity, Tobacco-use cessation, weight loss and cognition.  Checklist reviewing preventive services available has been given to the patient.    Reviewed patients plan of care and provided an AVS. The Basic Care Plan (routine screening as documented in Health Maintenance) for Mohamud meets the Care Plan requirement. This Care Plan has been established and reviewed with  the Patient.    Chhaya Malone MD  [facility]    Identified Health Risks:  I have reviewed Opioid Use Disorder and Substance Use Disorder risk factors and made any needed referrals.

## 2023-11-08 LAB
APO A-I SERPL-MCNC: <6 MG/DL
CHOLEST SERPL-MCNC: 234 MG/DL
HDLC SERPL-MCNC: 88 MG/DL
LDLC SERPL CALC-MCNC: 133 MG/DL
NONHDLC SERPL-MCNC: 146 MG/DL
TRIGL SERPL-MCNC: 65 MG/DL

## 2023-11-09 RX ORDER — CLOBETASOL PROPIONATE 0.5 MG/G
OINTMENT TOPICAL 2 TIMES DAILY
Qty: 60 G | Refills: 1 | Status: SHIPPED | OUTPATIENT
Start: 2023-11-09

## 2023-11-09 RX ORDER — CLINDAMYCIN HCL 300 MG
CAPSULE ORAL
Qty: 2 CAPSULE | Refills: 3 | Status: SHIPPED | OUTPATIENT
Start: 2023-11-09

## 2023-11-09 RX ORDER — ESTRADIOL 0.1 MG/G
2 CREAM VAGINAL
Qty: 43 G | Refills: 11 | Status: SHIPPED | OUTPATIENT
Start: 2023-11-09

## 2023-11-09 RX ORDER — AMITRIPTYLINE HYDROCHLORIDE 10 MG/1
TABLET ORAL
Qty: 180 TABLET | Refills: 3 | Status: SHIPPED | OUTPATIENT
Start: 2023-11-09 | End: 2023-11-16

## 2023-11-16 DIAGNOSIS — Z76.0 ENCOUNTER FOR MEDICATION REFILL: ICD-10-CM

## 2023-11-16 DIAGNOSIS — F51.01 PRIMARY INSOMNIA: ICD-10-CM

## 2023-11-16 RX ORDER — AMITRIPTYLINE HYDROCHLORIDE 10 MG/1
TABLET ORAL
Qty: 135 TABLET | Refills: 4 | Status: SHIPPED | OUTPATIENT
Start: 2023-11-16 | End: 2023-11-17

## 2023-11-17 DIAGNOSIS — Z76.0 ENCOUNTER FOR MEDICATION REFILL: ICD-10-CM

## 2023-11-17 DIAGNOSIS — F51.01 PRIMARY INSOMNIA: ICD-10-CM

## 2023-11-17 RX ORDER — AMITRIPTYLINE HYDROCHLORIDE 10 MG/1
TABLET ORAL
Qty: 225 TABLET | Refills: 4 | Status: SHIPPED | OUTPATIENT
Start: 2023-11-17

## 2023-12-18 ENCOUNTER — ANCILLARY PROCEDURE (OUTPATIENT)
Dept: MAMMOGRAPHY | Facility: CLINIC | Age: 73
End: 2023-12-18
Attending: FAMILY MEDICINE
Payer: COMMERCIAL

## 2023-12-18 DIAGNOSIS — Z12.31 VISIT FOR SCREENING MAMMOGRAM: ICD-10-CM

## 2023-12-18 PROCEDURE — 77067 SCR MAMMO BI INCL CAD: CPT

## 2024-01-26 ENCOUNTER — MYC MEDICAL ADVICE (OUTPATIENT)
Dept: FAMILY MEDICINE | Facility: CLINIC | Age: 74
End: 2024-01-26
Payer: COMMERCIAL

## 2024-01-29 DIAGNOSIS — N95.2 POSTMENOPAUSAL ATROPHIC VAGINITIS: ICD-10-CM

## 2024-01-29 DIAGNOSIS — N39.46 MIXED STRESS AND URGE URINARY INCONTINENCE: Primary | ICD-10-CM

## 2024-01-29 DIAGNOSIS — R35.0 URINARY FREQUENCY: ICD-10-CM

## 2024-01-30 NOTE — TELEPHONE ENCOUNTER
Chhaya Malone MD  Say, Adam Kelley Cem, RN  Hi -  I put in a new referral to PT, specifically pelvic health. Please print out the order and fax it as Mohamud requested. Once you've done that, please reply to her to let her know it's taken care of. THANKS        Order printed and faxed. Pt notified.      Adam Kelley Cem Say, BSN RN  [facility]

## 2024-08-08 ENCOUNTER — TELEPHONE (OUTPATIENT)
Dept: FAMILY MEDICINE | Facility: CLINIC | Age: 74
End: 2024-08-08
Payer: COMMERCIAL

## 2024-08-08 NOTE — TELEPHONE ENCOUNTER
Reason for Call:  Appointment Request    Patient requesting this type of appt:  referral but wants to be seen first    Requested provider: Chhaya Malone    Reason patient unable to be scheduled: Not within requested timeframe    When does patient want to be seen/preferred time: 1-2 weeks    Comments: neck pain and wants to be seen for a refferal    Could we send this information to you in [facility] or would you prefer to receive a phone call?:   Patient would prefer a phone call   Okay to leave a detailed message?: Yes at Home number on file [phone] (home)    Call taken on 8/8/2024 at 11:04 AM by Sandra Chen

## 2024-08-09 ENCOUNTER — OFFICE VISIT (OUTPATIENT)
Dept: FAMILY MEDICINE | Facility: CLINIC | Age: 74
End: 2024-08-09
Payer: COMMERCIAL

## 2024-08-09 VITALS
RESPIRATION RATE: 16 BRPM | DIASTOLIC BLOOD PRESSURE: 60 MMHG | TEMPERATURE: 97.5 F | HEIGHT: 69 IN | HEART RATE: 80 BPM | OXYGEN SATURATION: 99 % | SYSTOLIC BLOOD PRESSURE: 128 MMHG | WEIGHT: 169 LBS | BODY MASS INDEX: 25.03 KG/M2

## 2024-08-09 DIAGNOSIS — M54.2 NECK PAIN ON RIGHT SIDE: Primary | ICD-10-CM

## 2024-08-09 DIAGNOSIS — Z00.00 PREVENTATIVE HEALTH CARE: ICD-10-CM

## 2024-08-09 DIAGNOSIS — Z23 NEED FOR PNEUMOCOCCAL 20-VALENT CONJUGATE VACCINATION: ICD-10-CM

## 2024-08-09 DIAGNOSIS — Z23 NEED FOR COVID-19 VACCINE: ICD-10-CM
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
## 2024-08-09 PROCEDURE — 90677 PCV20 VACCINE IM: CPT | Performed by: FAMILY MEDICINE

## 2024-08-09 PROCEDURE — 91320 SARSCV2 VAC 30MCG TRS-SUC IM: CPT | Performed by: FAMILY MEDICINE

## 2024-08-09 PROCEDURE — G0009 ADMIN PNEUMOCOCCAL VACCINE: HCPCS | Performed by: FAMILY MEDICINE

## 2024-08-09 PROCEDURE — 99215 OFFICE O/P EST HI 40 MIN: CPT | Mod: 25 | Performed by: FAMILY MEDICINE

## 2024-08-09 PROCEDURE — 90480 ADMN SARSCOV2 VAC 1/ONLY CMP: CPT | Performed by: FAMILY MEDICINE

## 2024-08-09 NOTE — PROGRESS NOTES
Assessment & Plan     Neck pain on right side  This problem seems, from history and exam, to be a muscle problem. She agrees to keep using prn tylenol, ice and heat, but also see PT for help  if these do not help, then additional testing - imaging - is indicated.  - Physical Therapy  Referral    Need for COVID-19 vaccine  given  - COVID-19 12+ (2023-24) (PFIZER)    Need for pneumococcal 20-valent conjugate vaccination  given  - Pneumococcal 20 Valent Conjugate (PCV20)    Bug bite  I believe the spots on her left arm are bug bites. She will apply hydrocortisone and follow - letting me know if these do not resolve.    Preventative health care  reviewed  - REVIEW OF HEALTH MAINTENANCE PROTOCOL ORDERS    On the day of service, I spent 40 minutes with the patient, preparing for the visit with chart review, and charting.                      Jony Mallory is a 74 year old, presenting for the following health issues:  Neck Pain (X 2 wks ) and Derm Problem (On the right hand and arm )      8/9/2024     6:51 AM   Additional Questions   Roomed by ABDI Jain   Accompanied by self     History of Present Illness       Reason for visit:  Neck pain  Symptom onset:  1-2 weeks ago    She eats 4 or more servings of fruits and vegetables daily.She consumes 1 sweetened beverage(s) daily.She exercises with enough effort to increase her heart rate 20 to 29 minutes per day.  She exercises with enough effort to increase her heart rate 6 days per week.   She is taking medications regularly.     7/25 neck pain with turning head to the right and looking down - pain is on the back right - tight cramping. Pain relieves once she moves. Not at all sure what caused this. Tylenol helps but not consistently.     Red spots on right forearm - pain - not itch.         Objective    BP (!) 151/73 (BP Location: Right arm, Patient Position: Sitting, Cuff Size: Adult Regular)   Pulse 80   Temp 97.5  F (36.4  C) (Oral)   Resp 16    "Ht 1.753 m (5' 9\")   Wt 76.7 kg (169 lb)   SpO2 99%   BMI 24.96 kg/m    Body mass index is 24.96 kg/m .  Physical Exam   GENERAL: alert and no distress  NECK: no adenopathy, no asymmetry, masses, or scars, thyroid normal to palpation, and trachea midline and normal to palpation  RESP: lungs clear to auscultation - no rales, rhonchi or wheezes  CV: regular rate and rhythm, normal S1 S2, no S3 or S4, no murmur, click or rub, no peripheral edema  SKIN: on left forearm, there are two erythematous spots both of which have scatter petechiae and one vesicle near the center; no suspicious lesions or rashes  PSYCH: mentation appears normal, affect normal/bright    Office Visit on 11/07/2023   Component Date Value Ref Range Status    Cholesterol 11/07/2023 234 (H)  <200 mg/dL Final    Triglycerides 11/07/2023 65  <150 mg/dL Final    Direct Measure HDL 11/07/2023 88  >=50 mg/dL Final    LDL Cholesterol Calculated 11/07/2023 133 (H)  <=100 mg/dL Final    Non HDL Cholesterol 11/07/2023 146 (H)  <130 mg/dL Final    Lipoprotein (a) 11/07/2023 <6  <30 mg/dL Final           Signed Electronically by: Chhaya Malone MD    "

## 2024-08-26 ENCOUNTER — THERAPY VISIT (OUTPATIENT)
Dept: PHYSICAL THERAPY | Facility: REHABILITATION | Age: 74
End: 2024-08-26
Attending: FAMILY MEDICINE
Payer: COMMERCIAL

## 2024-08-26 DIAGNOSIS — M54.2 NECK PAIN ON RIGHT SIDE: ICD-10-CM

## 2024-08-26 DIAGNOSIS — M54.2 NECK PAIN: Primary | ICD-10-CM

## 2024-08-26 PROCEDURE — 97140 MANUAL THERAPY 1/> REGIONS: CPT | Mod: GP | Performed by: PHYSICAL THERAPIST

## 2024-08-26 PROCEDURE — 97110 THERAPEUTIC EXERCISES: CPT | Mod: GP | Performed by: PHYSICAL THERAPIST

## 2024-08-26 PROCEDURE — 97161 PT EVAL LOW COMPLEX 20 MIN: CPT | Mod: GP | Performed by: PHYSICAL THERAPIST

## 2024-08-26 NOTE — PROGRESS NOTES
PHYSICAL THERAPY EVALUATION  Type of Visit: Evaluation       Fall Risk Screen:  Fall screen completed by: PT  Have you fallen 2 or more times in the past year?: No  Have you fallen and had an injury in the past year?: No  Is patient a fall risk?: No    Subjective       Presenting condition or subjective complaint: pain when I move my neck    No pain when looking forward. July 25th started and just feels like she woke up one day with it. Wonders if some anxiousness started the pain. Hurts when she turns her head to the right. Driving is the most bothersome activity due to turning to the R. Has been taking some tylenol but that doesn't help at all.     Date of onset: 07/25/24    Relevant medical history:     Dates & types of surgery:      Prior diagnostic imaging/testing results: Other no   Prior therapy history for the same diagnosis, illness or injury: No          Living Environment  Social support: With a significant other or spouse   Type of home: 1 level   Stairs to enter the home: No       Ramp: No   Stairs inside the home: No       Help at home: None  Equipment owned: Raised toilet seat     Employment: No    Hobbies/Interests: reading, sewing    Patient goals for therapy: move without pain    Pain assessment: See objective evaluation for additional pain details     Objective   CERVICAL SPINE EVALUATION  PAIN: Pain Level at Rest: 0/10  Pain Level with Use: 6/10  Pain Location: right neck   INTEGUMENTARY (edema, incisions):   POSTURE: Sitting Posture: Rounded shoulders, sits with guarded posture  ROM:  cervical flexion mod limited stiffness, cervical extension mod limited stiffness, R sidebending max limited P+, L sidebending max limited P+, R rotations max limited P+, L rotation max limited P+      MYOTOMES: WNL  DTR S:   CORD SIGNS:   DERMATOMES: WNL  SPECIAL TESTS: WNL  PALPATION:  min tenderness to R scalenes and UT     Assessment & Plan   CLINICAL IMPRESSIONS  Medical Diagnosis: Neck Pain    Treatment  Diagnosis: Right Neck Pain   Impression/Assessment: Patient is a 74 year old female with neck complaints.  The following significant findings have been identified: Pain, Decreased ROM/flexibility, Decreased joint mobility, Decreased strength, Impaired balance, Inflammation, Impaired muscle performance, Decreased activity tolerance, Impaired posture, and Instability. These impairments interfere with their ability to perform self care tasks, work tasks, recreational activities, household chores, driving , household mobility, and community mobility as compared to previous level of function.     Clinical Decision Making (Complexity):  Clinical Presentation: Stable/Uncomplicated  Clinical Presentation Rationale: based on medical and personal factors listed in PT evaluation  Clinical Decision Making (Complexity): Low complexity    PLAN OF CARE  Treatment Interventions:  Modalities: Cryotherapy, Hot Pack, Mechanical Traction  Interventions: Gait Training, Manual Therapy, Neuromuscular Re-education, Therapeutic Activity, Therapeutic Exercise, Self-Care/Home Management    Long Term Goals     PT Goal 1  Goal Identifier: turning head  Goal Description: The patient will be able to turn her head over her shoulder and be able to drive x30 minutes with pain <3/10  Rationale: to maximize safety and independence with performance of ADLs and functional tasks;to maximize safety and independence within the community;to maximize safety and independence with transportation  Target Date: 10/21/24      Frequency of Treatment: 1x/week  Duration of Treatment: x8 weeks    Recommended Referrals to Other Professionals:   Education Assessment:   Learner/Method: Patient  Education Comments: Eager to participate in therapy    Risks and benefits of evaluation/treatment have been explained.   Patient/Family/caregiver agrees with Plan of Care.     Evaluation Time:     PT Eval, Low Complexity Minutes (30733): 15       Signing Clinician: Jolene VILLATORO  Chuck, PT        [facility]                                                                                   OUTPATIENT PHYSICAL THERAPY      PLAN OF TREATMENT FOR OUTPATIENT REHABILITATION   Patient's Last Name, First Name, Mohamud Rodas YOB: 1950   Provider's Name   [facility]   Medical Record No.  [id number]     Onset Date: 07/25/24  Start of Care Date: 08/26/24     Medical Diagnosis:  Neck Pain      PT Treatment Diagnosis:  Right Neck Pain Plan of Treatment  Frequency/Duration: 1x/week/ x8 weeks    Certification date from 08/26/24 to 10/21/24         See note for plan of treatment details and functional goals     Jolene Woods, PT                         I CERTIFY THE NEED FOR THESE SERVICES FURNISHED UNDER        THIS PLAN OF TREATMENT AND WHILE UNDER MY CARE     (Physician attestation of this document indicates review and certification of the therapy plan).              Referring Provider:  Chhaya Malone    Initial Assessment  See Epic Evaluation- Start of Care Date: 08/26/24

## 2024-10-28 DIAGNOSIS — Z59.71 HAS HEALTH INSURANCE WITH INADEQUATE COVERAGE OF HEALTH EXPENSES: Primary | ICD-10-CM

## 2024-10-30 ENCOUNTER — PATIENT OUTREACH (OUTPATIENT)
Dept: CARE COORDINATION | Facility: CLINIC | Age: 74
End: 2024-10-30
Payer: COMMERCIAL

## 2024-12-09 ENCOUNTER — ANCILLARY PROCEDURE (OUTPATIENT)
Dept: GENERAL RADIOLOGY | Facility: CLINIC | Age: 74
End: 2024-12-09
Attending: PHYSICIAN ASSISTANT
Payer: COMMERCIAL

## 2024-12-09 ENCOUNTER — OFFICE VISIT (OUTPATIENT)
Dept: FAMILY MEDICINE | Facility: CLINIC | Age: 74
End: 2024-12-09
Payer: COMMERCIAL

## 2024-12-09 VITALS
HEART RATE: 91 BPM | SYSTOLIC BLOOD PRESSURE: 158 MMHG | RESPIRATION RATE: 16 BRPM | TEMPERATURE: 98.4 F | BODY MASS INDEX: 25.07 KG/M2 | OXYGEN SATURATION: 98 % | DIASTOLIC BLOOD PRESSURE: 78 MMHG | WEIGHT: 165.4 LBS | HEIGHT: 68 IN

## 2024-12-09 DIAGNOSIS — M25.532 LEFT WRIST PAIN: ICD-10-CM

## 2024-12-09 DIAGNOSIS — M25.532 LEFT WRIST PAIN: Primary | ICD-10-CM

## 2024-12-09 DIAGNOSIS — R03.0 ELEVATED BLOOD PRESSURE READING WITHOUT DIAGNOSIS OF HYPERTENSION: ICD-10-CM

## 2024-12-09 PROCEDURE — 90662 IIV NO PRSV INCREASED AG IM: CPT | Performed by: PHYSICIAN ASSISTANT

## 2024-12-09 PROCEDURE — 99213 OFFICE O/P EST LOW 20 MIN: CPT | Mod: 25 | Performed by: PHYSICIAN ASSISTANT

## 2024-12-09 PROCEDURE — 90480 ADMN SARSCOV2 VAC 1/ONLY CMP: CPT | Performed by: PHYSICIAN ASSISTANT

## 2024-12-09 PROCEDURE — G0008 ADMIN INFLUENZA VIRUS VAC: HCPCS | Performed by: PHYSICIAN ASSISTANT

## 2024-12-09 PROCEDURE — 73110 X-RAY EXAM OF WRIST: CPT | Mod: TC | Performed by: RADIOLOGY

## 2024-12-09 PROCEDURE — 91320 SARSCV2 VAC 30MCG TRS-SUC IM: CPT | Performed by: PHYSICIAN ASSISTANT

## 2024-12-09 ASSESSMENT — PAIN SCALES - GENERAL: PAINLEVEL_OUTOF10: NO PAIN (0)

## 2024-12-09 NOTE — PATIENT INSTRUCTIONS
Irena Mallory,    Thank you for allowing [facility] to manage your care.    I am unsure of the cause of your symptoms, but your exam and xray are reassuring. This could be wrist tendinitis. Use over the counter medications, ice/heat, and stretching.     If you develop worsening/changing symptoms at any time, please be seen in clinic/urgent care or call 911/go to the emergency department for evaluation as we discussed.    I made a referral to occupational therapy. They will be calling in approximately 1 week to set up your appointment.  If you do not hear from them, please call the specialty number on your after visit summary.     Your blood pressure was high today. Get a blood pressure cuff for use at home. Take and record your blood pressure twice daily for 2 weeks. If your blood pressure is greater than or equal to 140/90mm Hg on average, please contact us.    For your pain, please use Tylenol 650mg every 6 hours. Max acetaminophen (Tylenol) 3,000mg/24 hours    If you have any questions or concerns, please feel free to call us at [phone]    Sincerely,    Kalen Casper PA-C    Did you know?      You can schedule a video visit for follow-up appointments as well as future appointments for certain conditions.  Please see the below link.     [email or web address]    If you have not already done so,  I encourage you to sign up for [email or web address] ([email or web address]).  This will allow you to review your results, securely communicate with a provider, and schedule virtual visits as well.

## 2024-12-09 NOTE — PROGRESS NOTES
Assessment & Plan   Problem List Items Addressed This Visit    None  Visit Diagnoses       Left wrist pain    -  Primary    Relevant Orders    XR Wrist Left G/E 3 Views (Completed)    Occupational Therapy  Referral    Wrist/Arm/Hand Bracing Supplies Order Wrist Brace; Left; non-thumb spica           1. Left Wrist Pain  - X-ray ordered and with advanced degenerative changes without acute findings.  - Likely tendonitis discussed as working diagnosis. No trauma to suggest fracture/dislocation. No erythema, warmth, pain with PROM to suggest septic arthritis, gout, or other worrisome inflammatory process.    2. Preventive Care  - Flu and COVID vaccine given     Complete history and physical exam as below. Afebrile with normal vital signs except for elevated bp, which they will monitor at home and contact us if >140/90mmHg on average.    DDx and Dx discussed with and explained to the pt to their satisfaction.  All questions were answered at this time. Pt expressed understanding of and agreement with this dx, tx, and plan. No further workup warranted and standard medication warnings given. I have given the patient a list of pertinent indications for re-evaluation. Will go to the Emergency Department if symptoms worsen or new concerning symptoms arise. Patient left in no apparent distress.     See Patient Instructions      Subjective   Mohamud is a 74 year old, presenting for the following health issues:  Musculoskeletal Problem        12/9/2024     2:17 PM   Additional Questions   Roomed by Whitney Barton CMA   Accompanied by N/A         12/9/2024     2:17 PM   Patient Reported Additional Medications   Patient reports taking the following new medications No new medications     History of Present Illness       Reason for visit:  Wrist pain  Symptom onset:  3-4 weeks ago  Symptoms include:  Pain when I bend my hand  Symptom intensity:  Mild  Symptom progression:  Staying the same  Had these symptoms before:   "Yes  Has tried/received treatment for these symptoms:  No  What makes it worse:  Wearing no brace  What makes it better:  Wearing a brace   She is taking medications regularly.     Patient is coming in today due to pain in the left wrist when flexing.  Started about a month ago randomly.  - Left wrist pain x 1 month, no injury  - Pain worse with wrist extension  - No new activities except regular elliptical 30 mins daily  - Pain rated 0/10 at rest, increases with movement  - No prior gout  - No current pain medications    Review of Systems  Constitutional, HEENT, cardiovascular, pulmonary, gi and gu systems are negative, except as otherwise noted.      Objective    BP (!) 150/74   Pulse 91   Temp 98.4  F (36.9  C) (Temporal)   Resp 16   Ht 1.736 m (5' 8.35\")   Wt 75 kg (165 lb 6.4 oz)   SpO2 98%   BMI 24.89 kg/m    Body mass index is 24.89 kg/m .  Physical Exam  Vitals and nursing note reviewed.   Constitutional:       General: She is not in acute distress.     Appearance: Normal appearance. She is not diaphoretic.   HENT:      Head: Normocephalic and atraumatic.      Nose: Nose normal.   Eyes:      Conjunctiva/sclera: Conjunctivae normal.   Pulmonary:      Effort: Pulmonary effort is normal. No respiratory distress.   Musculoskeletal:      Comments: LUE:  Full ROM of wrist with mild pain on extension/flexion  - No tenderness to palpation  - Normal  strength  - Normal finger movements  - Normal radial pulse  - No swelling noted   No overlying signs of trauma or infection. Distal CMS intact. Remainder of limb non-tender.      Skin:     General: Skin is dry.      Coloration: Skin is not jaundiced or pale.   Neurological:      General: No focal deficit present.      Mental Status: She is alert. Mental status is at baseline.   Psychiatric:         Mood and Affect: Mood normal.         Behavior: Behavior normal.            Results for orders placed or performed in visit on 12/09/24   XR Wrist Left G/E 3 Views  "    Status: None    Narrative    WRIST LEFT THREE OR MORE VIEWS December 9, 2024 2:39 PM    INDICATION: Left wrist pain.    COMPARISON: None available.       Impression    IMPRESSION: Anatomic alignment left wrist. No acute displaced left  wrist fracture. 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  Diffuse bone demineralization. No significant wrist soft tissue  swelling.    JOSE DO MD         SYSTEM ID:  [id number]           Signed Electronically by: RICHARD Padilla

## 2024-12-10 ENCOUNTER — TELEPHONE (OUTPATIENT)
Dept: FAMILY MEDICINE | Facility: CLINIC | Age: 74
End: 2024-12-10
Payer: COMMERCIAL

## 2024-12-10 NOTE — TELEPHONE ENCOUNTER
Spoke to spouse Surinder and scheduled with Dr. VARGAS 12/11 at 9:10am. Patient checks bp using spouse machine and her bp is averaging 132/69.      Done

## 2024-12-10 NOTE — TELEPHONE ENCOUNTER
Reason for Call:  Appointment Request    Patient requesting this type of appt:  discuss high bp     Requested provider: Chhaya Malone    Reason patient unable to be scheduled: Not within requested timeframe    When does patient want to be seen/preferred time:  asap    Comments: patient wondering if she could be worked in f    Could we send this information to you in [employer] or would you prefer to receive a phone call?:   No preference   Okay to leave a detailed message?: Yes at [phone]     Call taken on 12/10/2024 at 6:27 AM by Denice Mario

## 2024-12-11 ENCOUNTER — OFFICE VISIT (OUTPATIENT)
Dept: FAMILY MEDICINE | Facility: CLINIC | Age: 74
End: 2024-12-11
Payer: COMMERCIAL

## 2024-12-11 VITALS
SYSTOLIC BLOOD PRESSURE: 124 MMHG | OXYGEN SATURATION: 99 % | RESPIRATION RATE: 16 BRPM | TEMPERATURE: 97.2 F | WEIGHT: 164 LBS | DIASTOLIC BLOOD PRESSURE: 69 MMHG | BODY MASS INDEX: 24.29 KG/M2 | HEIGHT: 69 IN | HEART RATE: 82 BPM

## 2024-12-11 DIAGNOSIS — R03.0 ELEVATED BLOOD PRESSURE READING WITHOUT DIAGNOSIS OF HYPERTENSION: ICD-10-CM

## 2024-12-11 DIAGNOSIS — M25.532 LEFT WRIST PAIN: Primary | ICD-10-CM

## 2024-12-11 LAB
ANION GAP SERPL CALCULATED.3IONS-SCNC: 8 MMOL/L (ref 7–15)
BUN SERPL-MCNC: 10.3 MG/DL (ref 8–23)
CALCIUM SERPL-MCNC: 9.7 MG/DL (ref 8.8–10.4)
CHLORIDE SERPL-SCNC: 102 MMOL/L (ref 98–107)
CREAT SERPL-MCNC: 0.83 MG/DL (ref 0.51–0.95)
EGFRCR SERPLBLD CKD-EPI 2021: 73 ML/MIN/1.73M2
ERYTHROCYTE [DISTWIDTH] IN BLOOD BY AUTOMATED COUNT: 12.5 % (ref 10–15)
GLUCOSE SERPL-MCNC: 98 MG/DL (ref 70–99)
HCO3 SERPL-SCNC: 27 MMOL/L (ref 22–29)
HCT VFR BLD AUTO: 38.2 % (ref 35–47)
HGB BLD-MCNC: 13.4 G/DL (ref 11.7–15.7)
MCH RBC QN AUTO: 31.8 PG (ref 26.5–33)
MCHC RBC AUTO-ENTMCNC: 35.1 G/DL (ref 31.5–36.5)
MCV RBC AUTO: 91 FL (ref 78–100)
PLATELET # BLD AUTO: 161 10E3/UL (ref 150–450)
POTASSIUM SERPL-SCNC: 5.1 MMOL/L (ref 3.4–5.3)
RBC # BLD AUTO: 4.21 10E6/UL (ref 3.8–5.2)
SODIUM SERPL-SCNC: 137 MMOL/L (ref 135–145)
WBC # BLD AUTO: 3.9 10E3/UL (ref 4–11)

## 2024-12-11 PROCEDURE — 80048 BASIC METABOLIC PNL TOTAL CA: CPT | Performed by: FAMILY MEDICINE

## 2024-12-11 PROCEDURE — 85027 COMPLETE CBC AUTOMATED: CPT | Performed by: FAMILY MEDICINE

## 2024-12-11 PROCEDURE — 36415 COLL VENOUS BLD VENIPUNCTURE: CPT | Performed by: FAMILY MEDICINE

## 2024-12-11 PROCEDURE — 99214 OFFICE O/P EST MOD 30 MIN: CPT | Performed by: FAMILY MEDICINE

## 2024-12-11 NOTE — PROGRESS NOTES
Assessment & Plan     Left wrist pain  Is wearing a wrist splint some, taking prn acetaminophen, will do home exercises... and now will also try diclofenac gel. Hopefully these things will help her heal.  - diclofenac (VOLTAREN) 1 % topical gel  Dispense: 50 g; Refill: 4    Elevated blood pressure reading without diagnosis of hypertension  Check kidney function - it is fine. Her overall home blood pressures are drifting upward. We'll keep an eye on them, but I do not think we need to treat now. I also think it is good she takes the amitriptyline for sleep, so that is better and the sleep then helps her BP.  - Basic metabolic panel  (Ca, Cl, CO2, Creat, Gluc, K, Na, BUN)  - CBC with platelets  - Basic metabolic panel  (Ca, Cl, CO2, Creat, Gluc, K, Na, BUN)  - CBC with platelets    On the day of service, I spent 30 minutes with the patient, preparing for the visit with chart review, and charting.                            Jony Mallory is a 74 year old, presenting for the following health issues:  Follow up  (HTN )      12/11/2024     9:14 AM   Additional Questions   Roomed by day maloney   Accompanied by Self     History of Present Illness       Reason for visit:  Wrist pain  Symptom onset:  3-4 weeks ago  Symptoms include:  Pain when I bend my hand  Symptom intensity:  Mild  Symptom progression:  Staying the same  Had these symptoms before:  Yes  Has tried/received treatment for these symptoms:  No  What makes it worse:  Wearing no brace  What makes it better:  Wearing a brace   She is taking medications regularly.     Keeps track of BP at home once per week  Wrist pain  Poor sleep - up less often to urinate, but difficulty going back to sleep. Averages 6-7 hrs sleep/night.   Up - not sleepy - walks in kitchen 15 min - then back to bed.   sleeps in the other room.  When goes to sleep - puts on zoo.   In the middle of the night - has no plan.   Day after poor sleep - rarely naps - feels worn out.    Left  "wrist - has a brace - has papers with exercises.    Thinks her memory is \"going\"        Objective    /69   Pulse 82   Temp 97.2  F (36.2  C) (Oral)   Resp 16   Ht 1.745 m (5' 8.7\")   Wt 74.4 kg (164 lb)   SpO2 99%   BMI 24.43 kg/m    Body mass index is 24.43 kg/m .  Physical Exam   GENERAL: alert and no distress  RESP: lungs clear to auscultation - no rales, rhonchi or wheezes  CV: regular rate and rhythm, normal S1 S2, no S3 or S4, no murmur, click or rub, no peripheral edema  SKIN: no suspicious lesions or rashes  PSYCH: mentation appears normal, affect normal/bright    Results for orders placed or performed in visit on 12/11/24 (from the past 24 hours)   Basic metabolic panel  (Ca, Cl, CO2, Creat, Gluc, K, Na, BUN)   Result Value Ref Range    Sodium 137 135 - 145 mmol/L    Potassium 5.1 3.4 - 5.3 mmol/L    Chloride 102 98 - 107 mmol/L    Carbon Dioxide (CO2) 27 22 - 29 mmol/L    Anion Gap 8 7 - 15 mmol/L    Urea Nitrogen 10.3 8.0 - 23.0 mg/dL    Creatinine 0.83 0.51 - 0.95 mg/dL    GFR Estimate 73 >60 mL/min/1.73m2    Calcium 9.7 8.8 - 10.4 mg/dL    Glucose 98 70 - 99 mg/dL   CBC with platelets   Result Value Ref Range    WBC Count 3.9 (L) 4.0 - 11.0 10e3/uL    RBC Count 4.21 3.80 - 5.20 10e6/uL    Hemoglobin 13.4 11.7 - 15.7 g/dL    Hematocrit 38.2 35.0 - 47.0 %    MCV 91 78 - 100 fL    MCH 31.8 26.5 - 33.0 pg    MCHC 35.1 31.5 - 36.5 g/dL    RDW 12.5 10.0 - 15.0 %    Platelet Count 161 150 - 450 10e3/uL           Signed Electronically by: Chhaya Malone MD    "

## 2024-12-20 ENCOUNTER — ANCILLARY PROCEDURE (OUTPATIENT)
Dept: MAMMOGRAPHY | Facility: HOSPITAL | Age: 74
End: 2024-12-20
Attending: FAMILY MEDICINE
Payer: COMMERCIAL

## 2024-12-20 DIAGNOSIS — Z12.31 VISIT FOR SCREENING MAMMOGRAM: ICD-10-CM

## 2024-12-20 PROCEDURE — 77067 SCR MAMMO BI INCL CAD: CPT

## 2024-12-20 PROCEDURE — 77063 BREAST TOMOSYNTHESIS BI: CPT

## 2024-12-26 DIAGNOSIS — F51.01 PRIMARY INSOMNIA: Primary | ICD-10-CM

## 2024-12-26 RX ORDER — TRAZODONE HYDROCHLORIDE 50 MG/1
50 TABLET, FILM COATED ORAL AT BEDTIME
Qty: 30 TABLET | Refills: 11 | Status: SHIPPED | OUTPATIENT
Start: 2024-12-26

## 2025-01-16 SDOH — HEALTH STABILITY: PHYSICAL HEALTH: ON AVERAGE, HOW MANY MINUTES DO YOU ENGAGE IN EXERCISE AT THIS LEVEL?: 30 MIN

## 2025-01-16 SDOH — HEALTH STABILITY: PHYSICAL HEALTH: ON AVERAGE, HOW MANY DAYS PER WEEK DO YOU ENGAGE IN MODERATE TO STRENUOUS EXERCISE (LIKE A BRISK WALK)?: 6 DAYS

## 2025-01-16 ASSESSMENT — SOCIAL DETERMINANTS OF HEALTH (SDOH): HOW OFTEN DO YOU GET TOGETHER WITH FRIENDS OR RELATIVES?: THREE TIMES A WEEK

## 2025-01-21 ENCOUNTER — OFFICE VISIT (OUTPATIENT)
Dept: FAMILY MEDICINE | Facility: CLINIC | Age: 75
End: 2025-01-21
Attending: FAMILY MEDICINE
Payer: COMMERCIAL

## 2025-01-21 VITALS
OXYGEN SATURATION: 100 % | WEIGHT: 159 LBS | TEMPERATURE: 97.8 F | HEIGHT: 69 IN | SYSTOLIC BLOOD PRESSURE: 119 MMHG | RESPIRATION RATE: 20 BRPM | HEART RATE: 85 BPM | BODY MASS INDEX: 23.55 KG/M2 | DIASTOLIC BLOOD PRESSURE: 63 MMHG

## 2025-01-21 DIAGNOSIS — F51.01 PRIMARY INSOMNIA: ICD-10-CM

## 2025-01-21 DIAGNOSIS — J34.89 DRY NOSE: ICD-10-CM

## 2025-01-21 DIAGNOSIS — M85.852 OSTEOPENIA OF BOTH HIPS: ICD-10-CM

## 2025-01-21 DIAGNOSIS — Z00.00 PREVENTATIVE HEALTH CARE: ICD-10-CM

## 2025-01-21 DIAGNOSIS — E78.00 PURE HYPERCHOLESTEROLEMIA: ICD-10-CM

## 2025-01-21 DIAGNOSIS — M85.851 OSTEOPENIA OF BOTH HIPS: ICD-10-CM

## 2025-01-21 DIAGNOSIS — Z00.00 ANNUAL WELLNESS VISIT: Primary | ICD-10-CM

## 2025-01-21 PROCEDURE — 36415 COLL VENOUS BLD VENIPUNCTURE: CPT | Performed by: FAMILY MEDICINE

## 2025-01-21 PROCEDURE — 99214 OFFICE O/P EST MOD 30 MIN: CPT | Mod: 25 | Performed by: FAMILY MEDICINE

## 2025-01-21 PROCEDURE — G0439 PPPS, SUBSEQ VISIT: HCPCS | Performed by: FAMILY MEDICINE

## 2025-01-21 PROCEDURE — 80061 LIPID PANEL: CPT | Performed by: FAMILY MEDICINE

## 2025-01-21 RX ORDER — TRAZODONE HYDROCHLORIDE 50 MG/1
50 TABLET, FILM COATED ORAL AT BEDTIME
Qty: 90 TABLET | Refills: 4 | Status: SHIPPED | OUTPATIENT
Start: 2025-01-21

## 2025-01-21 NOTE — PROGRESS NOTES
Preventive Care Visit  [facility] JASON Malone MD, Family Medicine  Jan 21, 2025      Assessment & Plan     Annual wellness visit  Updated documents. She has a written health care document and has 3 people who can be contacted to speak for her if needed.    Primary insomnia  This is helping, however she has been taking it 30min before bed. She will change and start taking it 1.5 hrs before bed to see if it helps her to fall asleep faster. Also, if there is something disturbing, she can take 2 tabs on occasion. I really hope she can get up to 8 hrs sleep per night and that she notices an improvement in thinking and memory  - traZODone (DESYREL) 50 MG tablet  Dispense: 90 tablet; Refill: 4    Preventative health care  provided  - UA Macroscopic with reflex to Microscopic and Culture - Lab Collect    Pure hypercholesterolemia  Checking today  - Lipid panel reflex to direct LDL Fasting  - Lipid panel reflex to direct LDL Fasting    Osteopenia of both hips  Due for new check - see if she has progressed to osteoporosis  - DX Bone Density    Dry nose  Use OTC nasal saline prn, drink enough water, use a humidifier and/or a damp cloth in the bedroom.    Counseling  Appropriate preventive services were addressed with this patient via screening, questionnaire, or discussion as appropriate for fall prevention, nutrition, physical activity, Tobacco-use cessation, social engagement, weight loss and cognition.  Checklist reviewing preventive services available has been given to the patient.  Reviewed patient's diet, addressing concerns and/or questions.   She is at risk for psychosocial distress and has been provided with information to reduce risk.   Discussed possible causes of fatigue. Patient reported safety concerns were addressed today.Information on urinary incontinence and treatment options given to patient.                     Jony Mallory is a 74 year old, presenting for the  following:  Annual Visit        1/21/2025     8:07 AM   Additional Questions   Roomed by paw p   Accompanied by self         1/21/2025   Forms   Any forms needing to be completed Yes         1/21/2025     8:07 AM   Patient Reported Additional Medications   Patient reports taking the following new medications systane eye drop and dietary supplement 2 pills daily           HPI  Sleep - trazodone - fewer wakings, sleeping 7.1 hrs per night. Takes it 30 min before bed.    Memory - times of disorientation - while driving x 3 (recent) - going somewhere known but from a different direction;  and up at night in the dark - sees colored lines until she gets a light on (this was 6 mon ago).     Dry nose at night    Savage on back - growing - noticed by     Bone on shoulders sticking out more - is that ok?    The circulation to her toes is better! After exercising generally and specifically in the feet, she has noticed improvement.     Health Care Directive  Patient does not have a Health Care Directive: Advance Directive received and scanned. Click on Code in the patient header to view.      1/16/2025   General Health   How would you rate your overall physical health? Good   Feel stress (tense, anxious, or unable to sleep) To some extent   (!) STRESS CONCERN      1/16/2025   Nutrition   Diet: Regular (no restrictions)         1/16/2025   Exercise   Days per week of moderate/strenous exercise 6 days   Average minutes spent exercising at this level 30 min         1/16/2025   Social Factors   Frequency of gathering with friends or relatives Three times a week   Worry food won't last until get money to buy more No   Food not last or not have enough money for food? No   Do you have housing? (Housing is defined as stable permanent housing and does not include staying ouside in a car, in a tent, in an abandoned building, in an overnight shelter, or couch-surfing.) Yes   Are you worried about losing your housing? No   Lack of  transportation? No   Unable to get utilities (heat,electricity)? No         1/16/2025   Fall Risk   Fallen 2 or more times in the past year? No    No   Trouble with walking or balance? No    No       Multiple values from one day are sorted in reverse-chronological order          1/16/2025   Activities of Daily Living- Home Safety   Needs help with the following daily activites None of the above   Safety concerns in the home No grab bars in the bathroom         1/16/2025   Dental   Dentist two times every year? Yes         1/16/2025   Hearing Screening   Hearing concerns? None of the above         1/16/2025   Driving Risk Screening   Patient/family members have concerns about driving No         1/16/2025   General Alertness/Fatigue Screening   Have you been more tired than usual lately? (!) YES         1/16/2025   Urinary Incontinence Screening   Bothered by leaking urine in past 6 months Yes         1/16/2025   TB Screening   Were you born outside of the [place]? No         Today's PHQ-2 Score:       1/21/2025     8:01 AM   PHQ-2 ( 1999 Pfizer)   Q1: Little interest or pleasure in doing things 0    Q2: Feeling down, depressed or hopeless 0    PHQ-2 Score 0    Q1: Little interest or pleasure in doing things Not at all   Q2: Feeling down, depressed or hopeless Not at all   PHQ-2 Score 0       Proxy-reported           1/16/2025   Substance Use   Alcohol more than 3/day or more than 7/wk Not Applicable   Do you have a current opioid prescription? No   How severe/bad is pain from 1 to 10? 1/10   Do you use any other substances recreationally? (!) OTHER     Social History     Tobacco Use    Smoking status: Never     Passive exposure: Never    Smokeless tobacco: Never    Tobacco comments:     No passive smoke exposure   Vaping Use    Vaping status: Never Used   Substance Use Topics    Alcohol use: No    Drug use: No           12/20/2024   LAST FHS-7 RESULTS   1st degree relative breast or ovarian cancer Yes   Any relative  bilateral breast cancer No   Any male have breast cancer No   Any ONE woman have BOTH breast AND ovarian cancer No   Any woman with breast cancer before 50yrs Yes   2 or more relatives with breast AND/OR ovarian cancer No        Mammogram Screening - After age 74- determine frequency with patient based on health status, life expectancy and patient goals    ASCVD Risk   The 10-year ASCVD risk score (Shilo EDGAR, et al., 2019) is: 20.4%    Values used to calculate the score:      Age: 74 years      Sex: Female      Is Non- : No      Diabetic: No      Tobacco smoker: No      Systolic Blood Pressure: 145 mmHg      Is BP treated: No      HDL Cholesterol: 88 mg/dL      Total Cholesterol: 234 mg/dL    Fracture Risk Assessment Tool  Link to Frax Calculator  Use the information below to complete the Frax calculator  : 1950  Sex: female  Weight (kg): 72.1 kg (actual weight)  Height (cm): 176.5 cm  Previous Fragility Fracture:  No  History of parent with fractured hip:  No  Current Smoking:  No  Patient has been on glucocorticoids for more than 3 months (5mg/day or more): No  Rheumatoid Arthritis on Problem List:  No  Secondary Osteoporosis on Problem List:  No  Consumes 3 or more units of alcohol per day: No  Femoral Neck BMD (g/cm2)            Reviewed and updated as needed this visit by Provider                    Past Medical History:   Diagnosis Date    Allergy to dogs     - 2013: no allergy symptoms and not using antihistamines    Chickenpox childhood    h/o chickenpox in childhood per patient report    Closed fracture of head of left radius 2013    - 12:  fell on her; L radial head fracture; Dr Engel of [facility] recommended conservative management (also had left hip fracture s/p surgical repair by Dr Magana of [facility])    Closed fracture of unspecified part of neck of femur 2013    - 12 L hip fracture (L proximal femur),  fell on her (also  , L radial fracture, conservatively managed); - 8/27/13 Dr Sonu Magana ([facility]), [place]: ORIF (intramedullary fixation) L hip    H/O colonoscopy 06/01/2016    normal, repeat in 10 years = 2026    H/O echocardiogram 05/01/2007    - 5/4/07 TTE: NORMAL, EF60% (done because of vertigo/dizzy symptoms)       History of hepatitis A vaccination     Completed Hep A series per patient report documented in Dr. Carvalho's notes 8/12/05; patient declines testing for immunity 2/13/10//sds    History of hepatitis B vaccination     Completed Hep B series per patient report documented in Dr. Carvalho's notes 8/12/05; patient declines testing for immunity 2/13/10//sds    History of Holter monitoring 04/01/2009    - 4/1/09 NORMAL 24 hour holter monitor interpreted by Dr. Crouch at [facility] of low back pain 03/01/2013    - 3/16/13 NER low back pain after shoveing snow and kick-boxing video; rxd vicodin and naproxen    Hyperbilirubinemia 01/01/2005    Mild elevation of bilrubin dating back to 2005. Total bili around 1.1. Unclear etiology. 2016&17=1.6ish         Lump or mass in breast 09/01/2007    - 9/20/07 mammo & u/s: ACR2-benign.  U/s = left upper outer quadrant at 1:30 4 cm from nipple demonstrates fibrous ridge; stable, no significant change since 2005; - sister has h/o breast CA       Menarche 14 y/o    Menarche 14 y/o. Regular cycles q 24-26 days, heavy flow 7 days, last period 1987 (then had BARBARA for menorrhagia)    Meniere's disease 11/01/2009    h/o Meniere's Disease. Referred to [facility] by Dr. Knox 11/2009. Seen by [facility] 11/2008. Normal carotid u/s & TTE 5/2007           Menopause mid 50s y/o?    last period 1987 at 48 y/o (BARBARA for menorrhagia), then menopausal symptoms around 53 y/o    Osteopenia 01/01/2019    no bone density change from 2016-19.    Pneumonia, community acquired 10/01/2007    - 10/2007: LLL Pneumonia (CAP), uncomplicated    Radiological examination, not  elsewhere classified 05/01/2007    NORMAL carotid u/s 5/4/07 at [place]. Done because of symptoms of vertigo/dizziness    Rash 03/01/2019    biopsied by derm - who said could be lupus, but labs did not correlate.    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 Performed by Dr. Rhodes at [facility]         SD VAGINAL HYSTERECTOMY,UTERUS 250 GMS/<  01/01/1987    - 1987 Dr Gabriel Weiner, [facility] [place]: Vaginal hysterectomy for menorrhagia; cervix removed.         right hip replaced Right 2022    right hip replaced     Lab work is in process  Labs reviewed in EPIC  BP Readings from Last 3 Encounters:   01/21/25 119/63   12/11/24 124/69   12/09/24 (!) 158/78    Wt Readings from Last 3 Encounters:   01/21/25 72.1 kg (159 lb)   12/11/24 74.4 kg (164 lb)   12/09/24 75 kg (165 lb 6.4 oz)                  Current Outpatient Medications   Medication Sig Dispense Refill    fish oil-omega-3 fatty acids 1000 MG capsule Take 2 g by mouth      Multiple Vitamins-Minerals (PRESERVISION AREDS 2) CAPS One PO daily 90 capsule 4    traZODone (DESYREL) 50 MG tablet Take 1 tablet (50 mg) by mouth at bedtime. 90 tablet 4    vitamin D3 (CHOLECALCIFEROL) 50 mcg (2000 units) tablet Take 1 tablet by mouth daily      clobetasol (TEMOVATE) 0.05 % external ointment Apply topically 2 times daily Apply topically to rash stomach, arms and neck as directed. (Patient not taking: Reported on 1/21/2025) 60 g 1     Current providers sharing in care for this patient include:  Patient Care Team:  Chhaya Malone MD as PCP  "- General (Family Medicine)  Chhaya Malone MD as Assigned PCP  Jose Manuel Freire MD as MD (Urology)  Jose Manuel Freire MD as Assigned Surgical Provider    The following health maintenance items are reviewed in Epic and correct as of today:  Health Maintenance   Topic Date Due    RSV VACCINE (1 - 1-dose 75+ series) Never done    MEDICARE ANNUAL WELLNESS VISIT  11/07/2024    ANNUAL REVIEW OF HM ORDERS  08/09/2025    FALL RISK ASSESSMENT  01/21/2026    COLORECTAL CANCER SCREENING  06/03/2026    DTAP/TDAP/TD IMMUNIZATION (4 - Td or Tdap) 06/19/2026    GLUCOSE  12/11/2027    LIPID  11/07/2028    ADVANCE CARE PLANNING  11/09/2028    DEXA  05/20/2034    HEPATITIS C SCREENING  Completed    PHQ-2 (once per calendar year)  Completed    INFLUENZA VACCINE  Completed    Pneumococcal Vaccine: 50+ Years  Completed    ZOSTER IMMUNIZATION  Completed    COVID-19 Vaccine  Completed    HPV IMMUNIZATION  Aged Out    MENINGITIS IMMUNIZATION  Aged Out    RSV MONOCLONAL ANTIBODY  Aged Out    MAMMO SCREENING  Discontinued          Objective    Exam  BP (!) 145/81   Pulse 85   Temp 97.8  F (36.6  C) (Oral)   Resp 20   Ht 1.765 m (5' 9.49\")   Wt 72.1 kg (159 lb)   SpO2 100%   BMI 23.15 kg/m     Estimated body mass index is 23.15 kg/m  as calculated from the following:    Height as of this encounter: 1.765 m (5' 9.49\").    Weight as of this encounter: 72.1 kg (159 lb).    Physical Exam  GENERAL: alert and no distress  NECK: no adenopathy, no asymmetry, masses, or scars  RESP: lungs clear to auscultation - no rales, rhonchi or wheezes  CV: regular rates and rhythm, normal S1 S2, no S3 or S4, grade 2/6 early systolic murmur heard best over the LUSB, peripheral pulses strong, and no peripheral edema  SKIN: keratoses - seborrheic  PSYCH: mentation appears normal, affect normal/bright  Mid back - 9mm x 5mm        1/21/2025   Mini Cog   Clock Draw Score 0 Abnormal   3 Item Recall 3 objects recalled   Mini Cog Total Score 3          "     Signed Electronically by: Chhaya Malone MD

## 2025-01-22 LAB
CHOLEST SERPL-MCNC: 228 MG/DL
FASTING STATUS PATIENT QL REPORTED: YES
HDLC SERPL-MCNC: 70 MG/DL
LDLC SERPL CALC-MCNC: 147 MG/DL
NONHDLC SERPL-MCNC: 158 MG/DL
TRIGL SERPL-MCNC: 57 MG/DL

## 2025-01-23 ENCOUNTER — MYC MEDICAL ADVICE (OUTPATIENT)
Dept: FAMILY MEDICINE | Facility: CLINIC | Age: 75
End: 2025-01-23

## 2025-01-23 LAB
ALBUMIN UR-MCNC: NEGATIVE MG/DL
APPEARANCE UR: CLEAR
BACTERIA #/AREA URNS HPF: ABNORMAL /HPF
BILIRUB UR QL STRIP: NEGATIVE
CAOX CRY #/AREA URNS HPF: ABNORMAL /HPF
COLOR UR AUTO: YELLOW
GLUCOSE UR STRIP-MCNC: NEGATIVE MG/DL
HGB UR QL STRIP: NEGATIVE
KETONES UR STRIP-MCNC: 15 MG/DL
LEUKOCYTE ESTERASE UR QL STRIP: NEGATIVE
MUCOUS THREADS #/AREA URNS LPF: PRESENT /LPF
NITRATE UR QL: NEGATIVE
PH UR STRIP: 5.5 [PH] (ref 5–7)
RBC #/AREA URNS AUTO: ABNORMAL /HPF
SP GR UR STRIP: 1.02 (ref 1–1.03)
SQUAMOUS #/AREA URNS AUTO: ABNORMAL /LPF
UROBILINOGEN UR STRIP-ACNC: 0.2 E.U./DL
WBC #/AREA URNS AUTO: ABNORMAL /HPF

## 2025-01-23 NOTE — TELEPHONE ENCOUNTER
See 1/23/25 telephone encounter.  ESTRADA SuazoN, RN-BC  [facility]

## 2025-01-23 NOTE — TELEPHONE ENCOUNTER
Chart reviewed. Please review findings below.     Component      Latest Ref Rng 1/23/2025  4:00 AM   Color Urine      Colorless, Straw, Light Yellow, Yellow  Yellow    Appearance Urine      Clear  Clear    Glucose Urine      Negative mg/dL Negative    Bilirubin Urine      Negative  Negative    Ketones Urine      Negative mg/dL 15 !    Specific Gravity Urine      1.005 - 1.030  1.020    Blood Urine      Negative  Negative    pH Urine      5.0 - 7.0  5.5    Protein Albumin Urine      Negative mg/dL Negative    Urobilinogen Urine      0.2, 1.0 E.U./dL 0.2    Nitrite Urine      Negative  Negative    Leukocyte Esterase Urine      Negative  Negative       Legend:  ! Abnormal

## 2025-02-03 ENCOUNTER — HOSPITAL ENCOUNTER (OUTPATIENT)
Dept: BONE DENSITY | Facility: HOSPITAL | Age: 75
Discharge: HOME | End: 2025-02-03
Attending: FAMILY MEDICINE | Admitting: FAMILY MEDICINE
Payer: COMMERCIAL

## 2025-02-03 DIAGNOSIS — M85.852 OSTEOPENIA OF BOTH HIPS: ICD-10-CM

## 2025-02-03 DIAGNOSIS — M85.851 OSTEOPENIA OF BOTH HIPS: ICD-10-CM

## 2025-02-03 PROCEDURE — 77081 DXA BONE DENSITY APPENDICULR: CPT

## 2025-02-03 PROCEDURE — 77080 DXA BONE DENSITY AXIAL: CPT

## 2025-02-04 ENCOUNTER — TELEPHONE (OUTPATIENT)
Dept: FAMILY MEDICINE | Facility: CLINIC | Age: 75
End: 2025-02-04
Payer: COMMERCIAL

## 2025-02-04 NOTE — TELEPHONE ENCOUNTER
Test Results    Contacts       Contact Date/Time Type Contact Phone/Fax    02/04/2025 04:42 PM CST Phone (Incoming) Mohamud Mondragon (Self) [phone] (H)            Who ordered the test:  N/A    Type of test: Lab and Bone density    Date of test:  02/03/25    Where was the test performed:  Imaging Dept    What are your questions/concerns?:  Patient would like to know what those results are    Could we send this information to you in [facility] or would you prefer to receive a phone call?:   Patient would prefer a phone call   Okay to leave a detailed message?: Yes at Cell number on file:    Telephone Information:   Mobile [phone]

## 2025-02-05 NOTE — TELEPHONE ENCOUNTER
Chart reviewed. Please review findings below.     Bone density result is not yet available. No lab recent lab results to relay to patient.

## 2025-02-06 NOTE — TELEPHONE ENCOUNTER
Patient's DEXA scan report is available, please review and let us know what to relay to patient. As for labs I only see results from January 2025.      Sending to Dr Willis as covering provider for today.

## 2025-02-25 DIAGNOSIS — M85.851 OSTEOPENIA OF BOTH HIPS: ICD-10-CM

## 2025-02-25 DIAGNOSIS — R68.82 DECREASED LIBIDO: ICD-10-CM

## 2025-02-25 DIAGNOSIS — R73.9 ELEVATED BLOOD SUGAR: ICD-10-CM

## 2025-02-25 DIAGNOSIS — M85.852 OSTEOPENIA OF BOTH HIPS: ICD-10-CM

## 2025-02-25 DIAGNOSIS — G31.84 MILD COGNITIVE IMPAIRMENT: Primary | ICD-10-CM

## 2025-02-25 DIAGNOSIS — R03.0 ELEVATED BLOOD PRESSURE READING WITHOUT DIAGNOSIS OF HYPERTENSION: ICD-10-CM

## 2025-02-25 DIAGNOSIS — D63.8 ANEMIA IN OTHER CHRONIC DISEASES CLASSIFIED ELSEWHERE: ICD-10-CM

## 2025-02-25 DIAGNOSIS — F51.01 PRIMARY INSOMNIA: ICD-10-CM

## 2025-02-27 ENCOUNTER — LAB (OUTPATIENT)
Dept: LAB | Facility: CLINIC | Age: 75
End: 2025-02-27
Payer: COMMERCIAL

## 2025-02-27 DIAGNOSIS — F09 COGNITIVE DYSFUNCTION: Primary | ICD-10-CM

## 2025-02-27 DIAGNOSIS — R73.9 ELEVATED BLOOD SUGAR: ICD-10-CM

## 2025-02-27 DIAGNOSIS — F51.01 PRIMARY INSOMNIA: ICD-10-CM

## 2025-02-27 DIAGNOSIS — T56.1X1A: ICD-10-CM

## 2025-02-27 DIAGNOSIS — M85.851 OSTEOPENIA OF BOTH HIPS: ICD-10-CM

## 2025-02-27 DIAGNOSIS — R68.82 DECREASED LIBIDO: ICD-10-CM

## 2025-02-27 DIAGNOSIS — K90.9 INTESTINAL MALABSORPTION, UNSPECIFIED TYPE: ICD-10-CM

## 2025-02-27 DIAGNOSIS — R35.0 URINARY FREQUENCY: ICD-10-CM

## 2025-02-27 DIAGNOSIS — T56.5X1A: ICD-10-CM

## 2025-02-27 DIAGNOSIS — G31.84 MILD COGNITIVE IMPAIRMENT: ICD-10-CM

## 2025-02-27 DIAGNOSIS — R03.0 ELEVATED BLOOD PRESSURE READING WITHOUT DIAGNOSIS OF HYPERTENSION: ICD-10-CM

## 2025-02-27 DIAGNOSIS — T56.4X1A: ICD-10-CM

## 2025-02-27 DIAGNOSIS — M85.852 OSTEOPENIA OF BOTH HIPS: ICD-10-CM

## 2025-02-27 DIAGNOSIS — D63.8 ANEMIA IN OTHER CHRONIC DISEASES CLASSIFIED ELSEWHERE: ICD-10-CM

## 2025-02-27 DIAGNOSIS — G93.49 OTHER ENCEPHALOPATHY: ICD-10-CM

## 2025-02-27 DIAGNOSIS — I67.83 POSTERIOR REVERSIBLE ENCEPHALOPATHY SYNDROME: ICD-10-CM

## 2025-02-27 DIAGNOSIS — G32.81 CEREBELLAR ATAXIA IN DISEASES CLASSIFIED ELSEWHERE (H): ICD-10-CM

## 2025-02-27 DIAGNOSIS — R74.8 ABNORMAL LEVELS OF OTHER SERUM ENZYMES: ICD-10-CM

## 2025-02-27 DIAGNOSIS — T50.904A DRUG POISONING OF UNDETERMINED INTENT, INITIAL ENCOUNTER: ICD-10-CM

## 2025-02-27 LAB
ALBUMIN SERPL BCG-MCNC: 4.6 G/DL (ref 3.5–5.2)
ALP SERPL-CCNC: 88 U/L (ref 40–150)
ALT SERPL W P-5'-P-CCNC: 19 U/L (ref 0–50)
ANION GAP SERPL CALCULATED.3IONS-SCNC: 11 MMOL/L (ref 7–15)
AST SERPL W P-5'-P-CCNC: 22 U/L (ref 0–45)
BASOPHILS # BLD AUTO: 0.1 10E3/UL (ref 0–0.2)
BASOPHILS NFR BLD AUTO: 1 %
BILIRUB SERPL-MCNC: 1 MG/DL
BUN SERPL-MCNC: 11 MG/DL (ref 8–23)
CALCIUM SERPL-MCNC: 10.6 MG/DL (ref 8.8–10.4)
CHLORIDE SERPL-SCNC: 101 MMOL/L (ref 98–107)
CHOLEST SERPL-MCNC: 264 MG/DL
CORTIS SERPL-MCNC: 14.5 UG/DL
CREAT SERPL-MCNC: 0.72 MG/DL (ref 0.51–0.95)
CRP SERPL-MCNC: <3 MG/L
EGFRCR SERPLBLD CKD-EPI 2021: 87 ML/MIN/1.73M2
EOSINOPHIL # BLD AUTO: 0.3 10E3/UL (ref 0–0.7)
EOSINOPHIL NFR BLD AUTO: 5 %
ERYTHROCYTE [DISTWIDTH] IN BLOOD BY AUTOMATED COUNT: 13 % (ref 10–15)
EST. AVERAGE GLUCOSE BLD GHB EST-MCNC: 111 MG/DL
ESTRADIOL SERPL-MCNC: <5 PG/ML
FASTING STATUS PATIENT QL REPORTED: ABNORMAL
FERRITIN SERPL-MCNC: 82 NG/ML (ref 11–328)
FOLATE SERPL-MCNC: 32.3 NG/ML (ref 4.6–34.8)
GLUCOSE SERPL-MCNC: 101 MG/DL (ref 70–99)
HBA1C MFR BLD: 5.5 % (ref 0–5.6)
HCO3 SERPL-SCNC: 26 MMOL/L (ref 22–29)
HCT VFR BLD AUTO: 39.6 % (ref 35–47)
HCYS SERPL-SCNC: 8.2 UMOL/L (ref 0–15)
HDLC SERPL-MCNC: 87 MG/DL
HGB BLD-MCNC: 13.5 G/DL (ref 11.7–15.7)
IMM GRANULOCYTES # BLD: 0 10E3/UL
IMM GRANULOCYTES NFR BLD: 0 %
INSULIN SERPL-ACNC: 6 UU/ML (ref 2.6–24.9)
LDLC SERPL CALC-MCNC: 162 MG/DL
LYMPHOCYTES # BLD AUTO: 1.2 10E3/UL (ref 0.8–5.3)
LYMPHOCYTES NFR BLD AUTO: 24 %
MCH RBC QN AUTO: 30.8 PG (ref 26.5–33)
MCHC RBC AUTO-ENTMCNC: 34.1 G/DL (ref 31.5–36.5)
MCV RBC AUTO: 90 FL (ref 78–100)
MONOCYTES # BLD AUTO: 0.3 10E3/UL (ref 0–1.3)
MONOCYTES NFR BLD AUTO: 7 %
NEUTROPHILS # BLD AUTO: 3.2 10E3/UL (ref 1.6–8.3)
NEUTROPHILS NFR BLD AUTO: 63 %
NONHDLC SERPL-MCNC: 177 MG/DL
PLATELET # BLD AUTO: 201 10E3/UL (ref 150–450)
POTASSIUM SERPL-SCNC: 4.2 MMOL/L (ref 3.4–5.3)
PROGEST SERPL-MCNC: 0.5 NG/ML
PROT SERPL-MCNC: 7.6 G/DL (ref 6.4–8.3)
RBC # BLD AUTO: 4.39 10E6/UL (ref 3.8–5.2)
SHBG SERPL-SCNC: 113 NMOL/L (ref 30–135)
SODIUM SERPL-SCNC: 138 MMOL/L (ref 135–145)
T3FREE SERPL-MCNC: 2.4 PG/ML (ref 2–4.4)
T4 FREE SERPL-MCNC: 1.14 NG/DL (ref 0.9–1.7)
TRIGL SERPL-MCNC: 77 MG/DL
TSH SERPL DL<=0.005 MIU/L-ACNC: 2.48 UIU/ML (ref 0.3–4.2)
VIT B12 SERPL-MCNC: 961 PG/ML (ref 232–1245)
VIT D+METAB SERPL-MCNC: 53 NG/ML (ref 20–50)
WBC # BLD AUTO: 5.1 10E3/UL (ref 4–11)

## 2025-03-01 LAB
TESTOST FREE SERPL-MCNC: 0.07 NG/DL
TESTOST SERPL-MCNC: 10 NG/DL (ref 8–60)

## 2025-03-02 LAB
A-TOCOPHEROL VIT E SERPL-MCNC: 20.7 MG/L
BETA+GAMMA TOCOPHEROL SERPL-MCNC: <0.2 MG/L
T3REVERSE SERPL-MCNC: 14.1 NG/DL

## 2025-03-09 ENCOUNTER — TELEPHONE (OUTPATIENT)
Dept: FAMILY MEDICINE | Facility: CLINIC | Age: 75
End: 2025-03-09
Payer: COMMERCIAL

## 2025-03-10 NOTE — TELEPHONE ENCOUNTER
Please call  [phone] for [facility] labs. This patient wants to get the following tests done. PCP wants to know how to place the orders or if a written order is accepted by [facility]?    ApoE4 - CARDIO IQ APOE GENOTYPE, [facility] 64574  Arsenic, Lead, Mercury - HEAVY METALS PANEL [facility] 7642  Zinc, SERUM - [facility] 945  Copper, SERUM - [facility] 363  MMP9 - [facility] 84416  hs-CRP - [facility] 76497  Glutathione - [facility] [id number]    thanks

## 2025-03-11 NOTE — TELEPHONE ENCOUNTER
CMA consulted credible company-specific website. Prescriber please see process below.     MD must order all desired labs. Once ordered, alert support staff that labs are entered in Epic (make sure to indicate that labs will be completed by external lab). Once notified by provider that lab orders are signed in chart, [facility] will print lab requisition order(s) and arrange for patient to  at clinic so that patient can hand-carry orders to [facility]. We can also fax to [facility].

## 2025-03-17 ENCOUNTER — TELEPHONE (OUTPATIENT)
Dept: FAMILY MEDICINE | Facility: CLINIC | Age: 75
End: 2025-03-17
Payer: COMMERCIAL

## 2025-03-17 NOTE — TELEPHONE ENCOUNTER
Notified patient. Patient would like orders to be faxed to [facility]. Faxed (per patient request) to [facility] [place].

## 2025-03-17 NOTE — PROGRESS NOTES
All orders are in.  Please print them off and contact Mohamud/patient. Ask if she wants to take the paper orders with her OR if she wants us to fax the orders to [facility].    thanks

## 2025-03-17 NOTE — TELEPHONE ENCOUNTER
Pt asking Javy to call her back, she has a few more questions she would like to discuss with him.

## 2025-03-17 NOTE — TELEPHONE ENCOUNTER
PCP has entered all the labs requested.      Please print them off and contact Mohamud/patient. Ask if she/patient wants to take the paper orders with her OR if she wants us to fax the orders to [facility].     thanks

## 2025-03-17 NOTE — TELEPHONE ENCOUNTER
FYI - Status Update    Who is Calling: patient    Update: Patient is calling in to provide a fax number for the orders to be sent to [facility]. Fax: [phone]    Does caller want a call/response back: Yes     Could we send this information to you in [employer] or would you prefer to receive a phone call?:   Patient would prefer a phone call   Okay to leave a detailed message?: Yes at Home number on file [phone] (home)

## 2025-03-18 NOTE — TELEPHONE ENCOUNTER
Discussed this with patient today on telephone call. Confirmed with patient that fax number to desired [place] location is [phone].     Faxed per patient request.

## 2025-03-20 ENCOUNTER — TRANSFERRED RECORDS (OUTPATIENT)
Dept: HEALTH INFORMATION MANAGEMENT | Facility: CLINIC | Age: 75
End: 2025-03-20
Payer: COMMERCIAL

## 2025-03-26 ENCOUNTER — TELEPHONE (OUTPATIENT)
Dept: FAMILY MEDICINE | Facility: CLINIC | Age: 75
End: 2025-03-26

## 2025-03-26 DIAGNOSIS — F51.01 PRIMARY INSOMNIA: ICD-10-CM

## 2025-03-26 RX ORDER — TRAZODONE HYDROCHLORIDE 100 MG/1
100 TABLET ORAL AT BEDTIME
Qty: 90 TABLET | Refills: 4 | Status: SHIPPED | OUTPATIENT
Start: 2025-03-26

## 2025-05-15 ENCOUNTER — RESULTS FOLLOW-UP (OUTPATIENT)
Dept: FAMILY MEDICINE | Facility: CLINIC | Age: 75
End: 2025-05-15

## 2025-06-07 ENCOUNTER — HEALTH MAINTENANCE LETTER (OUTPATIENT)
Age: 75
End: 2025-06-07

## 2025-07-09 DIAGNOSIS — F51.01 PRIMARY INSOMNIA: Primary | ICD-10-CM

## 2025-07-09 DIAGNOSIS — G31.84 MILD COGNITIVE IMPAIRMENT: ICD-10-CM

## 2025-07-09 DIAGNOSIS — Z86.59 HX OF MAJOR DEPRESSION: ICD-10-CM

## 2025-07-09 DIAGNOSIS — R41.3 MEMORY DIFFICULTIES: ICD-10-CM

## 2025-07-15 ENCOUNTER — OFFICE VISIT (OUTPATIENT)
Dept: FAMILY MEDICINE | Facility: CLINIC | Age: 75
End: 2025-07-15
Payer: COMMERCIAL

## 2025-07-15 VITALS
SYSTOLIC BLOOD PRESSURE: 125 MMHG | WEIGHT: 146 LBS | HEIGHT: 69 IN | RESPIRATION RATE: 16 BRPM | TEMPERATURE: 97 F | HEART RATE: 81 BPM | DIASTOLIC BLOOD PRESSURE: 69 MMHG | BODY MASS INDEX: 21.62 KG/M2 | OXYGEN SATURATION: 97 %

## 2025-07-15 DIAGNOSIS — M79.642 PAIN OF LEFT HAND: ICD-10-CM

## 2025-07-15 DIAGNOSIS — R63.4 WEIGHT LOSS: ICD-10-CM

## 2025-07-15 DIAGNOSIS — G89.29 CHRONIC RIGHT SHOULDER PAIN: ICD-10-CM

## 2025-07-15 DIAGNOSIS — Z00.00 PREVENTATIVE HEALTH CARE: ICD-10-CM

## 2025-07-15 DIAGNOSIS — G31.84 MILD COGNITIVE IMPAIRMENT: ICD-10-CM

## 2025-07-15 DIAGNOSIS — M25.511 CHRONIC RIGHT SHOULDER PAIN: ICD-10-CM

## 2025-07-15 DIAGNOSIS — F51.01 PRIMARY INSOMNIA: Primary | ICD-10-CM

## 2025-07-15 DIAGNOSIS — Z23 NEED FOR VACCINATION: ICD-10-CM

## 2025-07-15 PROCEDURE — 3074F SYST BP LT 130 MM HG: CPT | Performed by: FAMILY MEDICINE

## 2025-07-15 PROCEDURE — 3078F DIAST BP <80 MM HG: CPT | Performed by: FAMILY MEDICINE

## 2025-07-15 PROCEDURE — 99213 OFFICE O/P EST LOW 20 MIN: CPT | Performed by: FAMILY MEDICINE

## 2025-07-15 PROCEDURE — 99207 E-CONSULT TO SLEEP MEDICINE (ADULT OUTPT PROVIDER TO SPECIALIST WRITTEN QUESTION & RESPONSE): CPT | Performed by: FAMILY MEDICINE

## 2025-07-15 RX ORDER — ASPIRIN 81 MG/1
81 TABLET ORAL DAILY
COMMUNITY

## 2025-07-15 RX ORDER — CALCIUM CARBONATE/VITAMIN D3 600 MG-10
1 TABLET ORAL 2 TIMES DAILY
COMMUNITY
End: 2025-07-16

## 2025-07-15 RX ORDER — IBUPROFEN 200 MG
CAPSULE ORAL DAILY
COMMUNITY

## 2025-07-15 NOTE — PROGRESS NOTES
Assessment & Plan     Primary insomnia  She continues to use trazodone to help her sleep. She agrees to try doxepin for a while to see if that works any better. Will see from the e-consult if sleep med recommends something else.  - Adult E-Consult to Sleep Medicine (Outpt Provider to Specialist Written Question & Response)    Chronic right shoulder pain  Likely due to overuse as she's been doing weight lifting daily. She will go to every other day.    Mild cognitive impairment  She is working extremely hard to get this evaluated and improved. She is in a program of her choosing which included a LOT of testing - none of which is covered by insurance. She is struggling with diet adjustments but doing her best, especially trying to get high fiber.  She is perseverating a bit on the memory loss and work to do against it. This does not help her function or current memory.  - Adult E-Consult to Sleep Medicine (Outpt Provider to Specialist Written Question & Response)    Pain of left hand  Likely osteoarthritis. Ortho offered injections prn.    Need for vaccination  Discussed getting covid with flu shot this fall; she will consider getting RSV at her pharmacy    Preventative health care  - REVIEW OF HEALTH MAINTENANCE PROTOCOL ORDERS    Weight loss  She has lost about 20#. She is trying to follow a specific diet which is healthy but is tricky for her to manage (resulting in the weight loss indirectly). She gets help from her . She agrees to try to eat some almonds mid-day to try to stop weight loss.    Follow-up                         Jony Mallory is a 75 year old, presenting for the following health issues:  Follow Up (Left wrist pain ) and Shoulder (Pain right)      7/15/2025     1:38 PM   Additional Questions   Roomed by ABDI Jain   Accompanied by self     History of Present Illness       Reason for visit:  Tendonitis left wrist    She eats 4 or more servings of fruits and vegetables daily.She  "consumes 0 sweetened beverage(s) daily.She exercises with enough effort to increase her heart rate 30 to 60 minutes per day.  She exercises with enough effort to increase her heart rate 6 days per week.   She is taking medications regularly.      Sleep is still a problem - was down to 6 hrs per night, now is nearly back to 7. It is a bit better because she is able to get back to sleep after going to the bathroom. She does not wear her eye mask and she puts the TV on quietly and then can get back to sleep.   Taking trazodone nightly.  Still needs a sleep study.    Diet- increasing fiber, eating more nuts and seeds plus avocado with salads. Writing down everything she eats.    Has had a week of funerals - 3 this week!    Has been doing brain [employer] games - she can't figure out how to do them.     Her 's medical problems - lots! Calcium in leg - surgery is not an option. Walking hurts, he refuses swim therapy, he is stubborn. He can do an ellipse 100 cycles for 10 min, then he is done. He limits his cookies. He has childhood trauma - his mom  when he was 1 and stepmom was not at all nice.    Her  talks with others about the work of caring for her - but she feels pretty normal yet.    Not able to nap in the afternoons. Half listens to [employer] radio while resting.    Labs - doing them again next week.    She is losing weight and others are asking her about it. Has lost about 20#.  Is doing weight-lifting 6 days per week - switching upper body and lower.    Enjoying life OK.  Heat is not terrible.     Had a nice anniversary trip to the [place].    PT for incontinence - helped by getting her on magnesium. Still getting up at night an average of 2 times.     Pomegranate seeds -       Objective    /69 (BP Location: Left arm, Patient Position: Sitting, Cuff Size: Adult Regular)   Pulse 81   Temp 97  F (36.1  C) (Oral)   Resp 16   Ht 1.765 m (5' 9.49\")   Wt 66.2 kg (146 lb)   SpO2 97%   BMI " 21.26 kg/m    Body mass index is 21.26 kg/m .  Physical Exam   GENERAL: alert and mild emotional distress  EYES: Eyes grossly normal to inspection, PERRL and conjunctivae and sclerae normal  RESP: lungs clear to auscultation - no rales, rhonchi or wheezes  CV: regular rate and rhythm, normal S1 S2, no S3 or S4, no murmur, click or rub, no peripheral edema  MS: right shoulder - full ROM; left hand - has enlarged random joints in hand and fingers  SKIN: no suspicious lesions or rashes  PSYCH: mentation appears normal, affect flat, anxious, judgement and insight intact, and appearance well groomed    Lab on 02/27/2025   Component Date Value Ref Range Status    Sodium 02/27/2025 138  135 - 145 mmol/L Final    Potassium 02/27/2025 4.2  3.4 - 5.3 mmol/L Final    Carbon Dioxide (CO2) 02/27/2025 26  22 - 29 mmol/L Final    Anion Gap 02/27/2025 11  7 - 15 mmol/L Final    Urea Nitrogen 02/27/2025 11.0  8.0 - 23.0 mg/dL Final    Creatinine 02/27/2025 0.72  0.51 - 0.95 mg/dL Final    GFR Estimate 02/27/2025 87  >60 mL/min/1.73m2 Final    eGFR calculated using 2021 CKD-EPI equation.    Calcium 02/27/2025 10.6 (H)  8.8 - 10.4 mg/dL Final    Chloride 02/27/2025 101  98 - 107 mmol/L Final    Glucose 02/27/2025 101 (H)  70 - 99 mg/dL Final    Alkaline Phosphatase 02/27/2025 88  40 - 150 U/L Final    AST 02/27/2025 22  0 - 45 U/L Final    ALT 02/27/2025 19  0 - 50 U/L Final    Protein Total 02/27/2025 7.6  6.4 - 8.3 g/dL Final    Albumin 02/27/2025 4.6  3.5 - 5.2 g/dL Final    Bilirubin Total 02/27/2025 1.0  <=1.2 mg/dL Final    Estimated Average Glucose 02/27/2025 111  <117 mg/dL Final    Hemoglobin A1C 02/27/2025 5.5  0.0 - 5.6 % Final    Normal <5.7%   Prediabetes 5.7-6.4%    Diabetes 6.5% or higher     Note: Adopted from ADA consensus guidelines.    Estradiol 02/27/2025 <5  pg/mL Final    Healthy Men:   11.3-43.2 pg/mL    Healthy Postmenopausal Women:  Postmenopause: <5-138 pg/mL    Healthy Pregnant Women:  1st trimester:  154-3243 pg/mL  2nd trimester: 1561-00821 pg/mL  3rd trimester: 8525->78524 pg/mL    Healthy Women Cycle Phase:  Follicular: 30.9-90.4 pg/mL  Ovulation: 60.4-533 pg/mL  Luteal: 60.4-232 pg/mL    Healthy Women Cycle Sub-Phase:  Early Follicular: 20.5-62.8 pg/mL  Intermediate Follicular: 26-79.8 pg/mL  Late Follicular: 49.5-233 pg/mL  Ovulation: 60.4-602 pg/mL  Early Luteal: 51.1-179 pg/mL  Intermediate Luteal: 66.5-305 pg/mL  Late Luteal: 30.2-222 pg/mL    DHEA Sulfate 02/27/2025 23 (L)  35 - 430 ug/dL Final    Free T4 02/27/2025 1.14  0.90 - 1.70 ng/dL Final    T3 Free 02/27/2025 2.4  2.0 - 4.4 pg/mL Final    T3, Reverse ng/dL 02/27/2025 14.1  9.0 - 27.0 ng/dL Final    INTERPRETIVE INFORMATION: Triiodothyronine, Reverse -   LC-MS/MS    This test was developed and its performance characteristics   determined by [facility]. It has not been cleared or   approved by the US Food and Drug Administration. This test   was performed in a CLIA certified laboratory and is   intended for clinical purposes.  Performed By: [facility]  [address] [place], [place] [id number]  : Spencer John MD, PhD  CLIA Number: [id number]    Progesterone 02/27/2025 0.5  ng/mL Final      Healthy Postmenopausal Women:        Postmenopause: <=0.1 ng/mL     Healthy Pregnant Women:        1st Trimester: 11.0-44.3 ng/mL        2nd Trimester: 25.4-83.4 ng/mL        3rd Trimester: 58.7-214.0 ng/mL     Healthy Women Cycle Phase:        Follicular: <0.1-0.2 ng/mL        Ovulation: 0.1-4.1 ng/mL        Luteal: 4.1-14.5 ng/mL    Healthy Women Cycle Sub Phase:       Early Follicular: <0.1-0.3 ng/mL       Intermediate Follicular: <0.1-0.2 ng/mL       Late Follicular: <0.1-0.2 ng/mL       Ovulation: <0.1-2.4 ng/mL       Early Luteal: 2.4-15.1 ng/mL       Intermediate Luteal: 4.8-20.9 ng/mL       Late Luteal: 0.5-13.5 ng/mL      Cortisol 02/27/2025 14.5    ug/dL Final    6 months and older:  6 to 10 AM Cortisol Reference  Range:  4-22 ug/dL   4 to 8 PM Cortisol Reference Range:  3-17 ug/dL    Cholesterol 02/27/2025 264 (H)  <200 mg/dL Final    Triglycerides 02/27/2025 77  <150 mg/dL Final    Direct Measure HDL 02/27/2025 87  >=50 mg/dL Final    LDL Cholesterol Calculated 02/27/2025 162 (H)  <100 mg/dL Final    Non HDL Cholesterol 02/27/2025 177 (H)  <130 mg/dL Final    Patient Fasting > 8hrs? 02/27/2025 Unknown   Final    Ferritin 02/27/2025 82  11 - 328 ng/mL Final    Folic Acid 02/27/2025 32.3  4.6 - 34.8 ng/mL Final    Homocysteine 02/27/2025 8.2  0.0 - 15.0 umol/L Final    A normal plasma homocysteine level likely rules out genetic defects in homocysteine metabolism.    CRP Inflammation 02/27/2025 <3.00  <5.00 mg/L Final    Insulin 02/27/2025 6.0  2.6 - 24.9 uU/mL Final    Vitamin B12 02/27/2025 961  232 - 1,245 pg/mL Final    Vitamin D, Total (25-Hydroxy) 02/27/2025 53 (H)  20 - 50 ng/mL Final    indicates supplementation, with increased risk of hypercalciuria    Vitamin E 02/27/2025 20.7 (H)  5.5 - 18.0 mg/L Final      This test was developed and its performance characteristics   determined by [facility]. It has not been cleared or   approved by the US Food and Drug Administration. This test   was performed in a CLIA certified laboratory and is   intended for clinical purposes.    Vitamin E Gamma 02/27/2025 <0.2  0.0 - 6.0 mg/L Final    Performed By: [facility]  [address] [place] [id number]  : Spencer John MD, PhD  CLIA Number: [id number]    WBC Count 02/27/2025 5.1  4.0 - 11.0 10e3/uL Final    RBC Count 02/27/2025 4.39  3.80 - 5.20 10e6/uL Final    Hemoglobin 02/27/2025 13.5  11.7 - 15.7 g/dL Final    Hematocrit 02/27/2025 39.6  35.0 - 47.0 % Final    MCV 02/27/2025 90  78 - 100 fL Final    MCH 02/27/2025 30.8  26.5 - 33.0 pg Final    MCHC 02/27/2025 34.1  31.5 - 36.5 g/dL Final    RDW 02/27/2025 13.0  10.0 - 15.0 % Final    Platelet Count 02/27/2025 201  150 - 450 10e3/uL  Final    % Neutrophils 02/27/2025 63  % Final    % Lymphocytes 02/27/2025 24  % Final    % Monocytes 02/27/2025 7  % Final    % Eosinophils 02/27/2025 5  % Final    % Basophils 02/27/2025 1  % Final    % Immature Granulocytes 02/27/2025 0  % Final    Absolute Neutrophils 02/27/2025 3.2  1.6 - 8.3 10e3/uL Final    Absolute Lymphocytes 02/27/2025 1.2  0.8 - 5.3 10e3/uL Final    Absolute Monocytes 02/27/2025 0.3  0.0 - 1.3 10e3/uL Final    Absolute Eosinophils 02/27/2025 0.3  0.0 - 0.7 10e3/uL Final    Absolute Basophils 02/27/2025 0.1  0.0 - 0.2 10e3/uL Final    Absolute Immature Granulocytes 02/27/2025 0.0  <=0.4 10e3/uL Final    Sex Hormone Binding Globulin 02/27/2025 113  30 - 135 nmol/L Final    Free Testosterone Calculated 02/27/2025 0.07  ng/dL Final    Adult Female Reference Range:  18-30 Years: 0.08-0.74 ng/dL  31-40 Years: 0.13-0.92 ng/dL  41-51 Years: 0.11-0.58 ng/dL  Postmenopausal: 0.06-0.38 ng/dL    Testosterone Total 02/27/2025 10  8 - 60 ng/dL Final    TSH 02/27/2025 2.48  0.30 - 4.20 uIU/mL Final           Signed Electronically by: Chhaya Malone MD

## 2025-07-17 ENCOUNTER — E-CONSULT (OUTPATIENT)
Dept: SLEEP MEDICINE | Facility: CLINIC | Age: 75
End: 2025-07-17
Payer: COMMERCIAL

## 2025-07-17 NOTE — PROGRESS NOTES
7/17/2025     E-Consult has been accepted.    Interprofessional consultation requested by:  Chhaya Malone MD      Clinical Question/Purpose: MY CLINICAL QUESTION IS: patient would like to sleep better especially to help her memory; recently she's improved with being able to get back to sleep after waking to urinate during the night    Patient assessment and information reviewed: Patient with insomnia on medical therapy    Recommendations: Consider sleep medicine consult to discuss cognitive behavioral training        The recommendations provided in this E-Consult are based on a review of clinical data pertinent to the clinical question presented, without a review of the patient's complete medical record or, the benefit of a comprehensive in-person or virtual patient evaluation. This consultation should not replace the clinical judgement and evaluation of the provider ordering this E-Consult. Any new clinical issues, or changes in patient status since the filing of this E-Consult will need to be taken into account when assessing these recommendations. Please contact me if you have further questions.    My total time spent reviewing clinical information and formulating assessment was 5 minutes.        Joel Dao MD